# Patient Record
Sex: FEMALE | Race: WHITE | Employment: OTHER | URBAN - METROPOLITAN AREA
[De-identification: names, ages, dates, MRNs, and addresses within clinical notes are randomized per-mention and may not be internally consistent; named-entity substitution may affect disease eponyms.]

---

## 2018-03-21 ENCOUNTER — HOSPITAL ENCOUNTER (OUTPATIENT)
Dept: PREADMISSION TESTING | Age: 73
Discharge: HOME | End: 2018-03-21
Payer: MEDICARE

## 2018-03-21 VITALS — HEIGHT: 58 IN | BODY MASS INDEX: 26.24 KG/M2 | WEIGHT: 125 LBS

## 2018-03-21 LAB
ALBUMIN SERPL-MCNC: 3.8 G/DL (ref 3.4–5)
ALBUMIN/GLOB SERPL: 1.2 {RATIO} (ref 0.8–1.7)
ALP SERPL-CCNC: 81 U/L (ref 45–117)
ALT SERPL-CCNC: 23 U/L (ref 13–56)
ANION GAP SERPL CALC-SCNC: 8 MMOL/L (ref 3–18)
AST SERPL-CCNC: 23 U/L (ref 15–37)
BILIRUB SERPL-MCNC: 0.5 MG/DL (ref 0.2–1)
BUN SERPL-MCNC: 21 MG/DL (ref 7–18)
BUN/CREAT SERPL: 29 (ref 12–20)
CALCIUM SERPL-MCNC: 9.6 MG/DL (ref 8.5–10.1)
CHLORIDE SERPL-SCNC: 104 MMOL/L (ref 100–108)
CO2 SERPL-SCNC: 30 MMOL/L (ref 21–32)
CREAT SERPL-MCNC: 0.73 MG/DL (ref 0.6–1.3)
ERYTHROCYTE [DISTWIDTH] IN BLOOD BY AUTOMATED COUNT: 13.4 % (ref 11.6–14.5)
GLOBULIN SER CALC-MCNC: 3.2 G/DL (ref 2–4)
GLUCOSE SERPL-MCNC: 84 MG/DL (ref 74–99)
HCT VFR BLD AUTO: 38.7 % (ref 35–45)
HGB BLD-MCNC: 12.3 G/DL (ref 12–16)
MCH RBC QN AUTO: 31.3 PG (ref 24–34)
MCHC RBC AUTO-ENTMCNC: 31.8 G/DL (ref 31–37)
MCV RBC AUTO: 98.5 FL (ref 74–97)
PLATELET # BLD AUTO: 276 K/UL (ref 135–420)
PMV BLD AUTO: 11.2 FL (ref 9.2–11.8)
POTASSIUM SERPL-SCNC: 4.7 MMOL/L (ref 3.5–5.5)
PROT SERPL-MCNC: 7 G/DL (ref 6.4–8.2)
RBC # BLD AUTO: 3.93 M/UL (ref 4.2–5.3)
SODIUM SERPL-SCNC: 142 MMOL/L (ref 136–145)
WBC # BLD AUTO: 7.5 K/UL (ref 4.6–13.2)

## 2018-03-21 PROCEDURE — 85027 COMPLETE CBC AUTOMATED: CPT | Performed by: ORTHOPAEDIC SURGERY

## 2018-03-21 PROCEDURE — 93005 ELECTROCARDIOGRAM TRACING: CPT

## 2018-03-21 PROCEDURE — 87641 MR-STAPH DNA AMP PROBE: CPT | Performed by: ORTHOPAEDIC SURGERY

## 2018-03-21 PROCEDURE — 80053 COMPREHEN METABOLIC PANEL: CPT | Performed by: ORTHOPAEDIC SURGERY

## 2018-03-21 RX ORDER — ASPIRIN 81 MG/1
81 TABLET ORAL DAILY
COMMUNITY
End: 2020-01-08

## 2018-03-21 RX ORDER — TIZANIDINE 2 MG/1
1 TABLET ORAL
COMMUNITY
End: 2021-12-11

## 2018-03-21 RX ORDER — GABAPENTIN 100 MG/1
400 CAPSULE ORAL
COMMUNITY
End: 2022-09-02

## 2018-03-21 RX ORDER — OMEPRAZOLE 20 MG/1
20 CAPSULE, DELAYED RELEASE ORAL
COMMUNITY
End: 2020-01-08

## 2018-03-21 RX ORDER — CELECOXIB 100 MG/1
100 CAPSULE ORAL DAILY
COMMUNITY
End: 2018-03-26

## 2018-03-21 RX ORDER — SODIUM CHLORIDE, SODIUM LACTATE, POTASSIUM CHLORIDE, CALCIUM CHLORIDE 600; 310; 30; 20 MG/100ML; MG/100ML; MG/100ML; MG/100ML
125 INJECTION, SOLUTION INTRAVENOUS CONTINUOUS
Status: CANCELLED | OUTPATIENT
Start: 2018-03-21

## 2018-03-21 NOTE — PERIOP NOTES
Denies sleep apnea or nay personal or family history of complications with anesthesia cece prep reviewed ,meets criteria for special population

## 2018-03-22 LAB
ATRIAL RATE: 74 BPM
BACTERIA SPEC CULT: NORMAL
CALCULATED P AXIS, ECG09: 31 DEGREES
CALCULATED R AXIS, ECG10: 50 DEGREES
CALCULATED T AXIS, ECG11: 45 DEGREES
DIAGNOSIS, 93000: NORMAL
P-R INTERVAL, ECG05: 182 MS
Q-T INTERVAL, ECG07: 382 MS
QRS DURATION, ECG06: 92 MS
QTC CALCULATION (BEZET), ECG08: 424 MS
SERVICE CMNT-IMP: NORMAL
VENTRICULAR RATE, ECG03: 74 BPM

## 2018-03-26 PROBLEM — M51.26 HNP (HERNIATED NUCLEUS PULPOSUS), LUMBAR: Status: ACTIVE | Noted: 2018-03-26

## 2018-03-26 PROBLEM — M51.36 DDD (DEGENERATIVE DISC DISEASE), LUMBAR: Status: ACTIVE | Noted: 2018-03-26

## 2018-03-26 PROBLEM — M48.061 SPINAL STENOSIS, LUMBAR: Status: ACTIVE | Noted: 2018-03-26

## 2018-03-29 PROBLEM — M43.16 SPONDYLOLISTHESIS OF LUMBAR REGION: Status: ACTIVE | Noted: 2018-03-29

## 2018-03-29 PROBLEM — M41.9 SCOLIOSIS OF THORACOLUMBAR SPINE: Status: ACTIVE | Noted: 2018-03-29

## 2018-03-29 NOTE — H&P
Patient Name:  Andrea Wild   YOB: 1945      Chief Complaint:  Right-sided buttock pain. History of Chief Complaint:  Ms. Temo Campos is a 69-year-old female who is being seen for right-sided buttock pain. She has had significant problems with the right-sided buttock and hip. Bending and turning causes pain. Standing for any length of time causes pain. She has a known history of scoliosis. She does not remember any specific accident or injury. She says her back, hip, and leg are still giving her problems, and she is still having pain. She has difficulty bending, turning, and twisting. Standing for any length of time or walking for any length of time causes pain. The shot around her hip did not really seem to help. She has difficulty doing any activities. Past Medical/Surgical History:    Disease/Disorder Type Date Side Surgery Date Side Comment   Arthritis          Depression          Hyperlipidemia          Gita cerebellar ataxia          Nerve disorder              Eye surgery 1962 bilateral        Leg surgery 1999 right        Rectum prolapse surgery 2008         Spinal fusion, cervical 2012  [facility] 01/05/2017 - C3-4, 5-6       Cataract extraction 2014         Hip replacement 2014 right      Allergies:  No known allergies. Ingredient Reaction Medication Name Comment   NO KNOWN ALLERGIES          Current Medications:    Medication Directions   Niaspan Extended-Release 1,000 mg tablet,extended release    paroxetine 20 mg tablet    Actonel 150 mg tablet    diclofenac sodium 75 mg tablet,delayed release      Social History:      SMOKING  Status Tobacco Type Units Per Day Yrs Used   Never smoker        ALCOHOL  Type: Wine. 8 oz. consumed weekly.     Family History:    Disease Detail Family Member Age Cause of Death Comments   Family history of Cancer   N    Family history of Stroke   N    Family history of Hypertension   N    Family history of Osteoarthritis   N      Review of Systems: Pertinent positives include depression, incontinence, loss of balance, muscle weakness and numbness/tingling. Pertinent negatives include blurred vision, chest pain, chills, cold, discharge of the eyes, dizziness, double vision, fever, headache, hearing loss, heart murmur, itching of the eyes, palpitations, redness of the eyes, rheumatic fever, ringing in ears, sore throat/hoarseness, weight change, abdominal pain, anxiety, bipolar disorder, bladder/kidney infection, bloody stool, blood in urine, burning sensation, changes in mood, chronic cough, diarrhea, difficulty breathing, difficulty swallowing, fainting, frequent urinating, fracture/dislocation, gas/bloating, gout, hemorrhoids, joint pain, joint stiffness, memory loss, nausea/vomiting, pain on breathing, painful urination, psoriasis, rash/itching, Raynaud's phenomenon, rheumatoid disease, seizure disorder, shortness of breath, sprain/strain, swelling of feet, tendonitis, varicose veins and wheezing. Vitals:  Date BP Pulse Temp (F) Resp. (per min.) Height (Total in.) Weight (lbs.) BMI   12/08/2017     59.00 120.00 24.24   01/10/2017     60.00  25.00   11/26/2013     60.00  26.37     Physical Examination:    General:  The patient appears healthy. Cardiovascular:  Arterial pulses are normal.  Skin:  The skin is normal appearing with no contusions or wounds noted. Heart- RRR  Lungs-CTA cullen  Abdomen- +BS,soft,nontender  Musculoskeletal:  There is tenderness around the right PSIS. The thoracolumbar spine has normal kyphosis, a normal appearance, and no scoliosis. There is full range of motion of the cervical, thoracic, and lumbar spine and of the hips, knees, and ankles. Straight leg raising and crossed straight leg raising tests are negative. Neurological:  There is no weakness in the thoracic, lumbar, or sacral spine or in the lower extremities or hips. Deep tendon reflexes are present and normal bilaterally.   Ankle and knee jerks are normal with no clonus. Radiograph Examination:  AP, lateral, bilateral oblique, flexion and extension views of the lumbar spine were obtained and interpreted in the office 12/8/17 and reveal severe degenerative disc disease with a 52 degrees scoliosis, apex to the left. An AP view of the pelvis was obtained and interpreted in the office and reveals right total hip arthroplasty. Review of her MRI scan of the lumbar spine [facility] 2/21/18 reveals severe spinal stenosis at L3 to S1 with spondylolisthesis and severe scoliosis above this. Plan:  Ms. Caitlyn Jenkins, her , and I had a long discussion about the treatments for her back pain, hip pain, and leg pain including surgical intervention, the risks, and benefits as well as the different surgical approaches and decision making. We also discussed nonsurgical care for this condition including medications, injections, physical therapy, rehabilitation, activity modification, and brace utilization. At this point, given her severe spinal stenosis, spondylolisthesis, and degenerative disc disease she would like to proceed with operative intervention. We will plan for L3 to S1 decompression and fusion. The risks versus the benefits as well as the alternatives were fully explained to the patient. Risks include, but are not limited to, paralysis, death, heart attack, stroke, pulmonary embolism, deep vein thrombosis, infection, failure to relieve pain, increase in back or leg pain, reherniation of disc material, need for revision surgery, scarring, spinal fluid leak, bowel or bladder dysfunction, disease transmission, chronic graft site pain, instrumentation failure, pseudarthrosis, and the need for chronic ambulatory assist devices. The patient states full understanding of the risks and benefits and wishes to proceed.       Admitting as inpatient acknowledging increased risk of anesthesia and need for post-operative monitoring of   Depression   Hyperlipidemia   [place]iddles cerebellar ataxia. Naoma Gerardo

## 2018-04-04 ENCOUNTER — APPOINTMENT (OUTPATIENT)
Dept: GENERAL RADIOLOGY | Age: 73
DRG: 460 | End: 2018-04-04
Attending: ORTHOPAEDIC SURGERY
Payer: MEDICARE

## 2018-04-04 ENCOUNTER — HOSPITAL ENCOUNTER (INPATIENT)
Age: 73
LOS: 5 days | Discharge: NURSING, REHAB OR LONG-TERM CARE | DRG: 460 | End: 2018-04-09
Attending: ORTHOPAEDIC SURGERY | Admitting: ORTHOPAEDIC SURGERY
Payer: MEDICARE

## 2018-04-04 ENCOUNTER — ANESTHESIA EVENT (OUTPATIENT)
Dept: SURGERY | Age: 73
DRG: 460 | End: 2018-04-04
Payer: MEDICARE

## 2018-04-04 ENCOUNTER — ANESTHESIA (OUTPATIENT)
Dept: SURGERY | Age: 73
DRG: 460 | End: 2018-04-04
Payer: MEDICARE

## 2018-04-04 DIAGNOSIS — M48.062 SPINAL STENOSIS OF LUMBAR REGION WITH NEUROGENIC CLAUDICATION: Primary | ICD-10-CM

## 2018-04-04 LAB
ABO + RH BLD: NORMAL
BLOOD GROUP ANTIBODIES SERPL: NORMAL
SPECIMEN EXP DATE BLD: NORMAL

## 2018-04-04 PROCEDURE — 74011000250 HC RX REV CODE- 250

## 2018-04-04 PROCEDURE — 74011000258 HC RX REV CODE- 258: Performed by: PHYSICIAN ASSISTANT

## 2018-04-04 PROCEDURE — 77030008683 HC TU ET CUF COVD -A: Performed by: ANESTHESIOLOGY

## 2018-04-04 PROCEDURE — 77030008462 HC STPLR SKN PROX J&J -A: Performed by: ORTHOPAEDIC SURGERY

## 2018-04-04 PROCEDURE — 76210000016 HC OR PH I REC 1 TO 1.5 HR: Performed by: ORTHOPAEDIC SURGERY

## 2018-04-04 PROCEDURE — 77030003029 HC SUT VCRL J&J -B: Performed by: ORTHOPAEDIC SURGERY

## 2018-04-04 PROCEDURE — 74011250636 HC RX REV CODE- 250/636

## 2018-04-04 PROCEDURE — C1713 ANCHOR/SCREW BN/BN,TIS/BN: HCPCS | Performed by: ORTHOPAEDIC SURGERY

## 2018-04-04 PROCEDURE — 77030012406 HC DRN WND PENRS BARD -A: Performed by: ORTHOPAEDIC SURGERY

## 2018-04-04 PROCEDURE — 77030020262 HC SOL INJ SOD CL 0.9% 100ML: Performed by: ORTHOPAEDIC SURGERY

## 2018-04-04 PROCEDURE — 86900 BLOOD TYPING SEROLOGIC ABO: CPT | Performed by: ORTHOPAEDIC SURGERY

## 2018-04-04 PROCEDURE — 0SG3071 FUSION OF LUMBOSACRAL JOINT WITH AUTOLOGOUS TISSUE SUBSTITUTE, POSTERIOR APPROACH, POSTERIOR COLUMN, OPEN APPROACH: ICD-10-PCS | Performed by: ORTHOPAEDIC SURGERY

## 2018-04-04 PROCEDURE — 74011250636 HC RX REV CODE- 250/636: Performed by: ORTHOPAEDIC SURGERY

## 2018-04-04 PROCEDURE — 77030003028 HC SUT VCRL J&J -A: Performed by: ORTHOPAEDIC SURGERY

## 2018-04-04 PROCEDURE — 77030013079 HC BLNKT BAIR HGGR 3M -A: Performed by: ANESTHESIOLOGY

## 2018-04-04 PROCEDURE — 74011250636 HC RX REV CODE- 250/636: Performed by: PHYSICIAN ASSISTANT

## 2018-04-04 PROCEDURE — 77030011640 HC PAD GRND REM COVD -A: Performed by: ORTHOPAEDIC SURGERY

## 2018-04-04 PROCEDURE — 74011250637 HC RX REV CODE- 250/637: Performed by: PHYSICIAN ASSISTANT

## 2018-04-04 PROCEDURE — 77010033678 HC OXYGEN DAILY

## 2018-04-04 PROCEDURE — 77030018836 HC SOL IRR NACL ICUM -A: Performed by: ORTHOPAEDIC SURGERY

## 2018-04-04 PROCEDURE — 77030032490 HC SLV COMPR SCD KNE COVD -B: Performed by: ORTHOPAEDIC SURGERY

## 2018-04-04 PROCEDURE — 76010000132 HC OR TIME 2.5 TO 3 HR: Performed by: ORTHOPAEDIC SURGERY

## 2018-04-04 PROCEDURE — 76060000036 HC ANESTHESIA 2.5 TO 3 HR: Performed by: ORTHOPAEDIC SURGERY

## 2018-04-04 PROCEDURE — 74011000250 HC RX REV CODE- 250: Performed by: PHYSICIAN ASSISTANT

## 2018-04-04 PROCEDURE — 36415 COLL VENOUS BLD VENIPUNCTURE: CPT | Performed by: ORTHOPAEDIC SURGERY

## 2018-04-04 PROCEDURE — 77030013708 HC HNDPC SUC IRR PULS STRY –B: Performed by: ORTHOPAEDIC SURGERY

## 2018-04-04 PROCEDURE — 0SG1071 FUSION OF 2 OR MORE LUMBAR VERTEBRAL JOINTS WITH AUTOLOGOUS TISSUE SUBSTITUTE, POSTERIOR APPROACH, POSTERIOR COLUMN, OPEN APPROACH: ICD-10-PCS | Performed by: ORTHOPAEDIC SURGERY

## 2018-04-04 PROCEDURE — 77030034849: Performed by: ORTHOPAEDIC SURGERY

## 2018-04-04 PROCEDURE — 30233N0 TRANSFUSION OF AUTOLOGOUS RED BLOOD CELLS INTO PERIPHERAL VEIN, PERCUTANEOUS APPROACH: ICD-10-PCS | Performed by: ANESTHESIOLOGY

## 2018-04-04 PROCEDURE — 77030008477 HC STYL SATN SLP COVD -A: Performed by: ANESTHESIOLOGY

## 2018-04-04 PROCEDURE — 65270000029 HC RM PRIVATE

## 2018-04-04 PROCEDURE — 77030004402 HC BUR NEUR STRY -C: Performed by: ORTHOPAEDIC SURGERY

## 2018-04-04 PROCEDURE — 77030006643: Performed by: ANESTHESIOLOGY

## 2018-04-04 PROCEDURE — 74011000250 HC RX REV CODE- 250: Performed by: ORTHOPAEDIC SURGERY

## 2018-04-04 PROCEDURE — 97162 PT EVAL MOD COMPLEX 30 MIN: CPT

## 2018-04-04 PROCEDURE — 77030020782 HC GWN BAIR PAWS FLX 3M -B: Performed by: ORTHOPAEDIC SURGERY

## 2018-04-04 DEVICE — SCREW SPNL L45MM OD7MM SLD GEN 3 PEDFUSE RESET: Type: IMPLANTABLE DEVICE | Site: SPINE LUMBAR | Status: FUNCTIONAL

## 2018-04-04 DEVICE — IMPLANTABLE DEVICE: Type: IMPLANTABLE DEVICE | Site: SPINE LUMBAR | Status: FUNCTIONAL

## 2018-04-04 DEVICE — SET SCR SPNL L5-7MM PEDFUSE: Type: IMPLANTABLE DEVICE | Site: SPINE LUMBAR | Status: FUNCTIONAL

## 2018-04-04 DEVICE — SCREW SPNL L50MM OD7MM SLD GEN 3 PEDFUSE RESET: Type: IMPLANTABLE DEVICE | Site: SPINE LUMBAR | Status: FUNCTIONAL

## 2018-04-04 DEVICE — ROD SPNL L80MM DIA5.5MM LORDTC PEDFUSE: Type: IMPLANTABLE DEVICE | Site: SPINE LUMBAR | Status: FUNCTIONAL

## 2018-04-04 RX ORDER — INSULIN LISPRO 100 [IU]/ML
INJECTION, SOLUTION INTRAVENOUS; SUBCUTANEOUS ONCE
Status: DISCONTINUED | OUTPATIENT
Start: 2018-04-04 | End: 2018-04-04 | Stop reason: HOSPADM

## 2018-04-04 RX ORDER — MAGNESIUM SULFATE 100 %
4 CRYSTALS MISCELLANEOUS AS NEEDED
Status: DISCONTINUED | OUTPATIENT
Start: 2018-04-04 | End: 2018-04-04 | Stop reason: HOSPADM

## 2018-04-04 RX ORDER — SODIUM CHLORIDE, SODIUM LACTATE, POTASSIUM CHLORIDE, CALCIUM CHLORIDE 600; 310; 30; 20 MG/100ML; MG/100ML; MG/100ML; MG/100ML
50 INJECTION, SOLUTION INTRAVENOUS CONTINUOUS
Status: DISCONTINUED | OUTPATIENT
Start: 2018-04-04 | End: 2018-04-04 | Stop reason: HOSPADM

## 2018-04-04 RX ORDER — OXYCODONE AND ACETAMINOPHEN 5; 325 MG/1; MG/1
1 TABLET ORAL AS NEEDED
Status: DISCONTINUED | OUTPATIENT
Start: 2018-04-04 | End: 2018-04-04 | Stop reason: HOSPADM

## 2018-04-04 RX ORDER — ONDANSETRON 2 MG/ML
INJECTION INTRAMUSCULAR; INTRAVENOUS AS NEEDED
Status: DISCONTINUED | OUTPATIENT
Start: 2018-04-04 | End: 2018-04-04 | Stop reason: HOSPADM

## 2018-04-04 RX ORDER — NALOXONE HYDROCHLORIDE 0.4 MG/ML
0.1 INJECTION, SOLUTION INTRAMUSCULAR; INTRAVENOUS; SUBCUTANEOUS
Status: DISCONTINUED | OUTPATIENT
Start: 2018-04-04 | End: 2018-04-04 | Stop reason: HOSPADM

## 2018-04-04 RX ORDER — OMEPRAZOLE 20 MG/1
20 CAPSULE, DELAYED RELEASE ORAL
Status: DISCONTINUED | OUTPATIENT
Start: 2018-04-04 | End: 2018-04-09 | Stop reason: HOSPADM

## 2018-04-04 RX ORDER — DEXTROSE 50 % IN WATER (D50W) INTRAVENOUS SYRINGE
25-50 AS NEEDED
Status: DISCONTINUED | OUTPATIENT
Start: 2018-04-04 | End: 2018-04-04 | Stop reason: HOSPADM

## 2018-04-04 RX ORDER — DEXAMETHASONE SODIUM PHOSPHATE 4 MG/ML
INJECTION, SOLUTION INTRA-ARTICULAR; INTRALESIONAL; INTRAMUSCULAR; INTRAVENOUS; SOFT TISSUE AS NEEDED
Status: DISCONTINUED | OUTPATIENT
Start: 2018-04-04 | End: 2018-04-04 | Stop reason: HOSPADM

## 2018-04-04 RX ORDER — FENTANYL CITRATE 50 UG/ML
INJECTION, SOLUTION INTRAMUSCULAR; INTRAVENOUS AS NEEDED
Status: DISCONTINUED | OUTPATIENT
Start: 2018-04-04 | End: 2018-04-04 | Stop reason: HOSPADM

## 2018-04-04 RX ORDER — HYDROCODONE BITARTRATE AND ACETAMINOPHEN 5; 325 MG/1; MG/1
1 TABLET ORAL
Status: DISCONTINUED | OUTPATIENT
Start: 2018-04-04 | End: 2018-04-05

## 2018-04-04 RX ORDER — GABAPENTIN 400 MG/1
400 CAPSULE ORAL
Status: DISCONTINUED | OUTPATIENT
Start: 2018-04-04 | End: 2018-04-09 | Stop reason: HOSPADM

## 2018-04-04 RX ORDER — ACETAMINOPHEN 325 MG/1
650 TABLET ORAL
Status: DISCONTINUED | OUTPATIENT
Start: 2018-04-04 | End: 2018-04-09 | Stop reason: HOSPADM

## 2018-04-04 RX ORDER — ONDANSETRON 4 MG/1
4 TABLET, ORALLY DISINTEGRATING ORAL
Status: DISCONTINUED | OUTPATIENT
Start: 2018-04-04 | End: 2018-04-09 | Stop reason: HOSPADM

## 2018-04-04 RX ORDER — SODIUM CHLORIDE 0.9 % (FLUSH) 0.9 %
5-10 SYRINGE (ML) INJECTION AS NEEDED
Status: DISCONTINUED | OUTPATIENT
Start: 2018-04-04 | End: 2018-04-04 | Stop reason: HOSPADM

## 2018-04-04 RX ORDER — CEFAZOLIN SODIUM 2 G/50ML
2 SOLUTION INTRAVENOUS EVERY 8 HOURS
Status: COMPLETED | OUTPATIENT
Start: 2018-04-04 | End: 2018-04-05

## 2018-04-04 RX ORDER — PAROXETINE HYDROCHLORIDE 20 MG/1
20 TABLET, FILM COATED ORAL DAILY
Status: DISCONTINUED | OUTPATIENT
Start: 2018-04-05 | End: 2018-04-09 | Stop reason: HOSPADM

## 2018-04-04 RX ORDER — ROCURONIUM BROMIDE 10 MG/ML
INJECTION, SOLUTION INTRAVENOUS AS NEEDED
Status: DISCONTINUED | OUTPATIENT
Start: 2018-04-04 | End: 2018-04-04 | Stop reason: HOSPADM

## 2018-04-04 RX ORDER — HYDROMORPHONE HYDROCHLORIDE 2 MG/ML
0.5 INJECTION, SOLUTION INTRAMUSCULAR; INTRAVENOUS; SUBCUTANEOUS
Status: DISCONTINUED | OUTPATIENT
Start: 2018-04-04 | End: 2018-04-04 | Stop reason: HOSPADM

## 2018-04-04 RX ORDER — SODIUM CHLORIDE, SODIUM LACTATE, POTASSIUM CHLORIDE, CALCIUM CHLORIDE 600; 310; 30; 20 MG/100ML; MG/100ML; MG/100ML; MG/100ML
INJECTION, SOLUTION INTRAVENOUS
Status: DISCONTINUED | OUTPATIENT
Start: 2018-04-04 | End: 2018-04-04 | Stop reason: HOSPADM

## 2018-04-04 RX ORDER — SODIUM CHLORIDE, SODIUM LACTATE, POTASSIUM CHLORIDE, CALCIUM CHLORIDE 600; 310; 30; 20 MG/100ML; MG/100ML; MG/100ML; MG/100ML
125 INJECTION, SOLUTION INTRAVENOUS CONTINUOUS
Status: DISCONTINUED | OUTPATIENT
Start: 2018-04-04 | End: 2018-04-05

## 2018-04-04 RX ORDER — PROPOFOL 10 MG/ML
INJECTION, EMULSION INTRAVENOUS AS NEEDED
Status: DISCONTINUED | OUTPATIENT
Start: 2018-04-04 | End: 2018-04-04 | Stop reason: HOSPADM

## 2018-04-04 RX ORDER — GLYCOPYRROLATE 0.2 MG/ML
INJECTION INTRAMUSCULAR; INTRAVENOUS AS NEEDED
Status: DISCONTINUED | OUTPATIENT
Start: 2018-04-04 | End: 2018-04-04 | Stop reason: HOSPADM

## 2018-04-04 RX ORDER — FENTANYL CITRATE 50 UG/ML
25 INJECTION, SOLUTION INTRAMUSCULAR; INTRAVENOUS
Status: ACTIVE | OUTPATIENT
Start: 2018-04-04 | End: 2018-04-04

## 2018-04-04 RX ORDER — SODIUM CHLORIDE 0.9 % (FLUSH) 0.9 %
5-10 SYRINGE (ML) INJECTION EVERY 8 HOURS
Status: DISCONTINUED | OUTPATIENT
Start: 2018-04-04 | End: 2018-04-09 | Stop reason: HOSPADM

## 2018-04-04 RX ORDER — SODIUM CHLORIDE 0.9 % (FLUSH) 0.9 %
5-10 SYRINGE (ML) INJECTION AS NEEDED
Status: DISCONTINUED | OUTPATIENT
Start: 2018-04-04 | End: 2018-04-09 | Stop reason: HOSPADM

## 2018-04-04 RX ORDER — HYDROCODONE BITARTRATE AND ACETAMINOPHEN 7.5; 325 MG/1; MG/1
1 TABLET ORAL
Status: DISCONTINUED | OUTPATIENT
Start: 2018-04-04 | End: 2018-04-05

## 2018-04-04 RX ORDER — DIAZEPAM 5 MG/1
2.5 TABLET ORAL
Status: DISCONTINUED | OUTPATIENT
Start: 2018-04-04 | End: 2018-04-09 | Stop reason: HOSPADM

## 2018-04-04 RX ORDER — TIZANIDINE 2 MG/1
1 TABLET ORAL
Status: DISCONTINUED | OUTPATIENT
Start: 2018-04-04 | End: 2018-04-07

## 2018-04-04 RX ORDER — NEOSTIGMINE METHYLSULFATE 5 MG/5 ML
SYRINGE (ML) INTRAVENOUS AS NEEDED
Status: DISCONTINUED | OUTPATIENT
Start: 2018-04-04 | End: 2018-04-04 | Stop reason: HOSPADM

## 2018-04-04 RX ORDER — POLYETHYLENE GLYCOL 3350 17 G/17G
17 POWDER, FOR SOLUTION ORAL DAILY
Status: DISCONTINUED | OUTPATIENT
Start: 2018-04-05 | End: 2018-04-09 | Stop reason: HOSPADM

## 2018-04-04 RX ORDER — MIDAZOLAM HYDROCHLORIDE 1 MG/ML
INJECTION, SOLUTION INTRAMUSCULAR; INTRAVENOUS AS NEEDED
Status: DISCONTINUED | OUTPATIENT
Start: 2018-04-04 | End: 2018-04-04 | Stop reason: HOSPADM

## 2018-04-04 RX ORDER — ONDANSETRON 2 MG/ML
4 INJECTION INTRAMUSCULAR; INTRAVENOUS ONCE
Status: DISCONTINUED | OUTPATIENT
Start: 2018-04-04 | End: 2018-04-04 | Stop reason: HOSPADM

## 2018-04-04 RX ORDER — EPHEDRINE SULFATE/0.9% NACL/PF 25 MG/5 ML
SYRINGE (ML) INTRAVENOUS AS NEEDED
Status: DISCONTINUED | OUTPATIENT
Start: 2018-04-04 | End: 2018-04-04 | Stop reason: HOSPADM

## 2018-04-04 RX ORDER — LUBIPROSTONE 24 UG/1
24 CAPSULE, GELATIN COATED ORAL 2 TIMES DAILY WITH MEALS
Status: DISCONTINUED | OUTPATIENT
Start: 2018-04-04 | End: 2018-04-09 | Stop reason: HOSPADM

## 2018-04-04 RX ORDER — CEFAZOLIN SODIUM 2 G/50ML
2 SOLUTION INTRAVENOUS ONCE
Status: COMPLETED | OUTPATIENT
Start: 2018-04-04 | End: 2018-04-04

## 2018-04-04 RX ORDER — DIPHENHYDRAMINE HYDROCHLORIDE 50 MG/ML
12.5 INJECTION, SOLUTION INTRAMUSCULAR; INTRAVENOUS
Status: DISCONTINUED | OUTPATIENT
Start: 2018-04-04 | End: 2018-04-09 | Stop reason: HOSPADM

## 2018-04-04 RX ORDER — NALOXONE HYDROCHLORIDE 0.4 MG/ML
0.1 INJECTION, SOLUTION INTRAMUSCULAR; INTRAVENOUS; SUBCUTANEOUS AS NEEDED
Status: DISCONTINUED | OUTPATIENT
Start: 2018-04-04 | End: 2018-04-09 | Stop reason: HOSPADM

## 2018-04-04 RX ORDER — LIDOCAINE HYDROCHLORIDE 20 MG/ML
INJECTION, SOLUTION EPIDURAL; INFILTRATION; INTRACAUDAL; PERINEURAL AS NEEDED
Status: DISCONTINUED | OUTPATIENT
Start: 2018-04-04 | End: 2018-04-04 | Stop reason: HOSPADM

## 2018-04-04 RX ORDER — DOCUSATE SODIUM 100 MG/1
100 CAPSULE, LIQUID FILLED ORAL 2 TIMES DAILY
Status: DISCONTINUED | OUTPATIENT
Start: 2018-04-04 | End: 2018-04-09 | Stop reason: HOSPADM

## 2018-04-04 RX ORDER — HYDROMORPHONE HYDROCHLORIDE 2 MG/ML
0.5 INJECTION, SOLUTION INTRAMUSCULAR; INTRAVENOUS; SUBCUTANEOUS
Status: DISCONTINUED | OUTPATIENT
Start: 2018-04-04 | End: 2018-04-04 | Stop reason: SDUPTHER

## 2018-04-04 RX ADMIN — FENTANYL CITRATE 25 MCG: 50 INJECTION, SOLUTION INTRAMUSCULAR; INTRAVENOUS at 15:03

## 2018-04-04 RX ADMIN — HYDROCODONE BITARTRATE AND ACETAMINOPHEN 1 TABLET: 7.5; 325 TABLET ORAL at 23:20

## 2018-04-04 RX ADMIN — CEFAZOLIN SODIUM 2 G: 2 SOLUTION INTRAVENOUS at 11:55

## 2018-04-04 RX ADMIN — MIDAZOLAM HYDROCHLORIDE 2 MG: 1 INJECTION, SOLUTION INTRAMUSCULAR; INTRAVENOUS at 11:43

## 2018-04-04 RX ADMIN — ROCURONIUM BROMIDE 40 MG: 10 INJECTION, SOLUTION INTRAVENOUS at 11:48

## 2018-04-04 RX ADMIN — TRANEXAMIC ACID 1 G: 100 INJECTION, SOLUTION INTRAVENOUS at 12:05

## 2018-04-04 RX ADMIN — SODIUM CHLORIDE, SODIUM LACTATE, POTASSIUM CHLORIDE, CALCIUM CHLORIDE: 600; 310; 30; 20 INJECTION, SOLUTION INTRAVENOUS at 12:45

## 2018-04-04 RX ADMIN — ONDANSETRON 4 MG: 2 INJECTION INTRAMUSCULAR; INTRAVENOUS at 13:48

## 2018-04-04 RX ADMIN — Medication: at 15:32

## 2018-04-04 RX ADMIN — SODIUM CHLORIDE, SODIUM LACTATE, POTASSIUM CHLORIDE, CALCIUM CHLORIDE: 600; 310; 30; 20 INJECTION, SOLUTION INTRAVENOUS at 09:37

## 2018-04-04 RX ADMIN — OMEPRAZOLE 20 MG: 20 CAPSULE, DELAYED RELEASE ORAL at 22:03

## 2018-04-04 RX ADMIN — TIZANIDINE 1 MG: 2 TABLET ORAL at 22:03

## 2018-04-04 RX ADMIN — FENTANYL CITRATE 50 MCG: 50 INJECTION, SOLUTION INTRAMUSCULAR; INTRAVENOUS at 12:29

## 2018-04-04 RX ADMIN — SODIUM CHLORIDE, SODIUM LACTATE, POTASSIUM CHLORIDE, AND CALCIUM CHLORIDE 125 ML/HR: 600; 310; 30; 20 INJECTION, SOLUTION INTRAVENOUS at 19:20

## 2018-04-04 RX ADMIN — DOCUSATE SODIUM 100 MG: 100 CAPSULE, LIQUID FILLED ORAL at 22:03

## 2018-04-04 RX ADMIN — FENTANYL CITRATE 25 MCG: 50 INJECTION, SOLUTION INTRAMUSCULAR; INTRAVENOUS at 15:13

## 2018-04-04 RX ADMIN — FENTANYL CITRATE 25 MCG: 50 INJECTION, SOLUTION INTRAMUSCULAR; INTRAVENOUS at 15:23

## 2018-04-04 RX ADMIN — FENTANYL CITRATE 50 MCG: 50 INJECTION, SOLUTION INTRAMUSCULAR; INTRAVENOUS at 12:03

## 2018-04-04 RX ADMIN — GLYCOPYRROLATE 0.5 MG: 0.2 INJECTION INTRAMUSCULAR; INTRAVENOUS at 14:06

## 2018-04-04 RX ADMIN — FENTANYL CITRATE 50 MCG: 50 INJECTION, SOLUTION INTRAMUSCULAR; INTRAVENOUS at 13:32

## 2018-04-04 RX ADMIN — Medication 3 MG: at 14:06

## 2018-04-04 RX ADMIN — GABAPENTIN 400 MG: 400 CAPSULE ORAL at 22:03

## 2018-04-04 RX ADMIN — ROCURONIUM BROMIDE 20 MG: 10 INJECTION, SOLUTION INTRAVENOUS at 12:34

## 2018-04-04 RX ADMIN — LUBIPROSTONE 24 MCG: 24 CAPSULE, GELATIN COATED ORAL at 19:24

## 2018-04-04 RX ADMIN — DEXAMETHASONE SODIUM PHOSPHATE 4 MG: 4 INJECTION, SOLUTION INTRA-ARTICULAR; INTRALESIONAL; INTRAMUSCULAR; INTRAVENOUS; SOFT TISSUE at 12:20

## 2018-04-04 RX ADMIN — PROPOFOL 120 MG: 10 INJECTION, EMULSION INTRAVENOUS at 11:48

## 2018-04-04 RX ADMIN — FENTANYL CITRATE 50 MCG: 50 INJECTION, SOLUTION INTRAMUSCULAR; INTRAVENOUS at 12:24

## 2018-04-04 RX ADMIN — LIDOCAINE HYDROCHLORIDE 50 MG: 20 INJECTION, SOLUTION EPIDURAL; INFILTRATION; INTRACAUDAL; PERINEURAL at 11:48

## 2018-04-04 RX ADMIN — GLYCOPYRROLATE 0.2 MG: 0.2 INJECTION INTRAMUSCULAR; INTRAVENOUS at 11:48

## 2018-04-04 RX ADMIN — SODIUM CHLORIDE, SODIUM LACTATE, POTASSIUM CHLORIDE, AND CALCIUM CHLORIDE 125 ML/HR: 600; 310; 30; 20 INJECTION, SOLUTION INTRAVENOUS at 09:37

## 2018-04-04 RX ADMIN — SODIUM CHLORIDE, SODIUM LACTATE, POTASSIUM CHLORIDE, AND CALCIUM CHLORIDE 125 ML/HR: 600; 310; 30; 20 INJECTION, SOLUTION INTRAVENOUS at 15:17

## 2018-04-04 RX ADMIN — CEFAZOLIN SODIUM 2 G: 2 SOLUTION INTRAVENOUS at 19:24

## 2018-04-04 RX ADMIN — FENTANYL CITRATE 50 MCG: 50 INJECTION, SOLUTION INTRAMUSCULAR; INTRAVENOUS at 13:11

## 2018-04-04 RX ADMIN — Medication 10 MG: at 13:47

## 2018-04-04 RX ADMIN — ROCURONIUM BROMIDE 10 MG: 10 INJECTION, SOLUTION INTRAVENOUS at 13:31

## 2018-04-04 RX ADMIN — Medication 10 MG: at 12:53

## 2018-04-04 RX ADMIN — Medication: at 17:09

## 2018-04-04 NOTE — ROUTINE PROCESS
1640  TRANSFER - IN REPORT:    Verbal report received from CLAUDIA Mcgee RN on [employer]  being received from PACU for routine post - op. Report consisted of patients Situation, Background, Assessment and   Recommendations(SBAR). Information from the following report(s) SBAR, Kardex, OR Summary, Intake/Output and MAR was reviewed with the receiving nurse. Opportunity for questions and clarification was provided. Assessment to be completed upon patients arrival to unit and care assumed.

## 2018-04-04 NOTE — PERIOP NOTES
TRANSFER - OUT REPORT:    Verbal report given to Femi Rose RN(name) on [facility]cel[facility]  being transferred to [address] [place] [address](unit) for routine progression of care       Report consisted of patients Situation, Background, Assessment and   Recommendations(SBAR). Information from the following report(s) SBAR, Kardex, Procedure Summary, MAR and Recent Results was reviewed with the receiving nurse. Lines:   Peripheral IV 04/04/18 Right Wrist (Active)   Site Assessment Clean, dry, & intact 4/4/2018  3:15 PM   Phlebitis Assessment 0 4/4/2018  3:15 PM   Infiltration Assessment 0 4/4/2018  3:15 PM   Dressing Status Clean, dry, & intact 4/4/2018  3:15 PM   Dressing Type Tape;Transparent 4/4/2018  3:15 PM   Hub Color/Line Status Green; Infusing 4/4/2018  3:15 PM        Opportunity for questions and clarification was provided.       Patient transported with:   O2 @ 2 liters  Registered Nurse

## 2018-04-04 NOTE — PROGRESS NOTES
Problem: Falls - Risk of  Goal: *Absence of Falls  Document Amara Fall Risk and appropriate interventions in the flowsheet.    Outcome: Progressing Towards Goal  Fall Risk Interventions:       Mentation Interventions: More frequent rounding         Elimination Interventions: Call light in reach

## 2018-04-04 NOTE — PROGRESS NOTES
[facility] at this time. Assessment completed in flowsheet. Pt is alert and oriented x 4. Denies SOB and chest pain. Pt lungs clear bilaterally. Capillary refill less than 3 seconds. Pt denies numbness and tingling to all extremities. Stated pain  0/10. Pt has 18G IV to the RT cephalic. Pt has ABD and 4x4  dressing. SCDs and TEDs applied  bilaterally. Pt encouraged to continue use of IS, Pt verbalized understanding. Ice pack applied. Call light and possessions within reach. Bed is in the lowest position. Will continue to monitor.     Pt's  at bedside    1810  PT in assessing patient

## 2018-04-04 NOTE — IP AVS SNAPSHOT
[address] [address] 
[phone] Patient: Agatha Still MRN: [id number] About your hospitalization You were admitted on:  April 4, 2018 You last received care in the:  THE [facility] [address] You were discharged on:  April 5, 2018 Why you were hospitalized Your primary diagnosis was:  Spinal Stenosis, Lumbar Your diagnoses also included:  Ddd (Degenerative Disc Disease), Lumbar, Hnp (Herniated Nucleus Pulposus), Lumbar, Spondylolisthesis Of Lumbar Region, Scoliosis Of Thoracolumbar Spine Follow-up Information Follow up With Details Comments Contact Info Nicho Tatum MD On 4/19/2018 Follow up appointment @ 1:00pm [address] [facility] and [address] [place] [address] 
[phone] Kristine Palomares [facility] [address] [address] 
[phone] Discharge Orders None A check bill indicates which time of day the medication should be taken. My Medications START taking these medications Instructions Each Dose to Equal  
 Morning Noon Evening Bedtime  
 traMADol 50 mg tablet Commonly known as:  ULTRAM  
   
Your last dose was: Your next dose is: Take 1 Tab by mouth every six (6) hours as needed. Max Daily Amount: 200 mg.  
 50 mg CONTINUE taking these medications Instructions Each Dose to Equal  
 Morning Noon Evening Bedtime  
 aspirin delayed-release 81 mg tablet Your last dose was: Your next dose is: Take 81 mg by mouth daily. 81 mg CALCIUM 500 + D PO Your last dose was: Your next dose is: Take 1 Tab by mouth two (2) times a day. 1 Tab CENTRUM SILVER WOMEN PO Your last dose was: Your next dose is: Take  by mouth daily. FISH OIL 1,000 mg Cap Generic drug:  omega-3 fatty acids-vitamin e Your last dose was: Your next dose is: Take 1 Cap by mouth daily. 1 Cap  
    
   
   
   
  
 gabapentin 100 mg capsule Commonly known as:  NEURONTIN Your last dose was: Your next dose is: Take 400 mg by mouth nightly. 400 mg MIRALAX 17 gram/dose powder Generic drug:  polyethylene glycol Your last dose was: Your next dose is: Take 17 g by mouth daily. 17 g  
    
   
   
   
  
 omeprazole 20 mg capsule Commonly known as:  PRILOSEC Your last dose was: Your next dose is: Take 20 mg by mouth nightly. Indications: gastroesophageal reflux disease 20 mg PARoxetine 20 mg tablet Commonly known as:  PAXIL Your last dose was: Your next dose is: Take 20 mg by mouth daily. 20 mg  
    
   
   
   
  
 tiZANidine 2 mg tablet Commonly known as:  Molinda Charlee Your last dose was: Your next dose is: Take 1 mg by mouth nightly. 1 mg TYLENOL PM PO Your last dose was: Your next dose is: Take 2 Tabs by mouth nightly. 2 Tab  
    
   
   
   
  
 VITAMIN C 500 mg tablet Generic drug:  ascorbic acid (vitamin C) Your last dose was: Your next dose is: Take 500 mg by mouth daily. 500 mg  
    
   
   
   
  
 VITAMIN D3 1,000 unit tablet Generic drug:  cholecalciferol Your last dose was: Your next dose is: Take 1,000 Units by mouth daily. 1000 Units Where to Get Your Medications Information on where to get these meds will be given to you by the nurse or doctor. ! Ask your nurse or doctor about these medications  
  traMADol 50 mg tablet Opioid Education Prescription Opioids: What You Need to Know: 
 
Prescription opioids can be used to help relieve moderate-to-severe pain and are often prescribed following a surgery or injury, or for certain health conditions. These medications can be an important part of treatment but also come with serious risks. Opioids are strong pain medicines. Examples include hydrocodone, oxycodone, fentanyl, and morphine. Heroin is an example of an illegal opioid. It is important to work with your health care provider to make sure you are getting the safest, most effective care. WHAT ARE THE RISKS AND SIDE EFFECTS OF OPIOID USE? Prescription opioids carry serious risks of addiction and overdose, especially with prolonged use. An opioid overdose, often marked by slow breathing, can cause sudden death. The use of prescription opioids can have a number of side effects as well, even when taken as directed. · Tolerance-meaning you might need to take more of a medication for the same pain relief · Physical dependence-meaning you have symptoms of withdrawal when the medication is stopped. Withdrawal symptoms can include nausea, sweating, chills, diarrhea, stomach cramps, and muscle aches. Withdrawal can last up to several weeks, depending on which drug you took and how long you took it. · Increased sensitivity to pain · Constipation · Nausea, vomiting, and dry mouth · Sleepiness and dizziness · Confusion · Depression · Low levels of testosterone that can result in lower sex drive, energy, and strength · Itching and sweating RISKS ARE GREATER WITH:      
· History of drug misuse, substance use disorder, or overdose · Mental health conditions (such as depression or anxiety) · Sleep apnea · Older age (72 years or older) · Pregnancy Avoid alcohol while taking prescription opioids. Also, unless specifically advised by your health care provider, medications to avoid include: · Benzodiazepines (such as Xanax or Valium) · Muscle relaxants (such as Soma or Flexeril) · Hypnotics (such as Ambien or Lunesta) · Other prescription opioids KNOW YOUR OPTIONS Talk to your health care provider about ways to manage your pain that don't involve prescription opioids. Some of these options may actually work better and have fewer risks and side effects. Options may include: 
· Pain relievers such as acetaminophen, ibuprofen, and naproxen · Some medications that are also used for depression or seizures · Physical therapy and exercise · Counseling to help patients learn how to cope better with triggers of pain and stress. · Application of heat or cold compress · Massage therapy · Relaxation techniques Be Informed Make sure you know the name of your medication, how much and how often to take it, and its potential risks & side effects. IF YOU ARE PRESCRIBED OPIOIDS FOR PAIN: 
· Never take opioids in greater amounts or more often than prescribed. Remember the goal is not to be pain-free but to manage your pain at a tolerable level. · Follow up with your primary care provider to: · Work together to create a plan on how to manage your pain. · Talk about ways to help manage your pain that don't involve prescription opioids. · Talk about any and all concerns and side effects. · Help prevent misuse and abuse. · Never sell or share prescription opioids · Help prevent misuse and abuse. · Store prescription opioids in a secure place and out of reach of others (this may include visitors, children, friends, and family). · Safely dispose of unused/unwanted prescription opioids: Find your community drug take-back program or your pharmacy mail-back program, or flush them down the toilet, following guidance from the Food and Drug Administration (www.fda.gov/Drugs/ResourcesForYou). · Visit www.cdc.gov/drugoverdose to learn about the risks of opioid abuse and overdose. · If you believe you may be struggling with addiction, tell your health care provider and ask for guidance or call [employer] at 7-225-167-SAYE. Discharge Instructions [facility] Dr. Elise Valentin *YOU MUST AVOID SMOKING OR BEING AROUND ANYONE WHO SMOKES. AVOID ALL PRODUCTS THAT CONTAIN NICOTINE. DO NOT TAKE IBUPROFEN OR ANTI--INFLAMMATORIES, AS THESE MAY ALTER THE HEALING OF THE FUSION. ACTIVITIES : 
*The first week after surgery 1. You may be up and walking about the house. 2.  Activities around the house, such as washing dishes, fixing light meals, and your own personal care are fine. 3.  Avoid strenuous activities, such as vacuuming, lifting laundry or grocery bags. 4.  Do not lift anything heavier than 1 gallon of milk (or about 5-8 pounds). 5.  Do not bend over to  items from the ground level until 3 months post-op. *Week 2 and beyond 1. You may gradually increase your activities, but still avoid heavy lifting, pushing/pulling. 2.  Walking is the best way to rebuild strength and stamina. Start SLOWLY and gradually increase the distance a little every week. 3.  Walk at a pace that avoids fatigue or severe pain. Do not try to walk several blocks the first day! As you increase the distance, you may feel tired. If so, stop and rest.  
4.  You should be able to walk several blocks by your first clinic visit. 5.  Follow-up with Dr. Sherie Sanchez in 10-14 days. BATHING and INCISION CARE: 
1. The incision may be tender to touch or feel numb: this is normal.  
2.  Keep the incision clean and dry. Do not get incision wet for 5 days. The incision will be closed with sutures under the skin and the skin will be glued. 3.  Do not apply any lotions, ointments or oils on the incision.   
4.  If you notice any excessive swelling, redness, or persistent drainage around the incision, notify the office immediately. DRIVIN. You should not drive until after your follow-up appointment. 2.  You can be in a vehicle for short distances, but if you travel any long distance, please stop about every 30 minutes and walk/stretch. 3.  You should NEVER drive while taking narcotic medication. RETURN TO WORK : 
1. The decision to return to work will be determined on an individual basis. 2.  Many people who have a strenuous job (construction, heavy labor, etc) may need to be off work for up to 12 weeks. 3.  If you need a work note, please let us know as soon as possible, and not the same day you are planning to return to work. NUTRITION : 
1.  Good nutrition is an essential part of healing. 2.  You should eat a balanced diet each day, including fruits, vegetables, dairy products and protein. 3.  Remember to drink plenty of water. 4.  If you have not had a bowel movement within 3 days of surgery, you will need to use a laxative or suppository that can be obtained over-the-counter at your local pharmacy. BONE STIMULATOR: 
1. A spinal bone stimulator may be prescribed for you under certain situations. 2.  A nurse will call you if one has been requested and discuss its use for approximately 4-6 months post-op every day. 3.  This device assists in bone healing and fusion. MEDICATIONS - 
1. You may resume the medications you were taking before surgery, with the general exception of (or DO NOT TAKE) non-steroidal anti-inflammatory medications, such as Motrin, Aleve, Advil Naprosyn, Ibuprofen or aspirin. 2.  You will receive a prescription for pain medication at discharge from the hospital. The pain medication works best if taken before the pain becomes severe. 3.  To reduce stomach upset, always take the medication with food. 4.  Begin to wean yourself off the pain medication during the second week after discharge. 5.  If you need a refill, please call the office during working hours at least 2 days before your prescription runs out. Do not wait until your bottle is empty to call for a refill. 6.  DO NOT drive if you are taking narcotic pain medications. HOME HEALTH CARE: 
1.   A home health care service has been set-up for you to help assist you once you leave the hospital. 
2.  They will contact you either before you leave the hospital or within 24 hours once you have been discharged home. 3. A nurse will assist you with your dressing changes and a Physical Therapist with help you with your therapy needs. CALL THE OFFICE: 
? If you have severe pain unrelieved by the medications, new numbness or tingling in your legs; 
? If you have a fever of 101.0°F or greater;  
? If you notice excessive swelling, redness, or persistent drainage from the incision or IV site; The [facility] office number is [phone] from 8:00am to 5:00pm Monday through Friday. After 5:00pm, on weekends, or holidays, please leave a message with our answering service and the doctor on-call will get back to you shortly. Introducing [facility]! Winsome Stinson introduces [facility] patient portal. Now you can access parts of your medical record, email your doctor's office, and request medication refills online. 1. In your internet browser, go to [email or web address]"[email or web address]t 2. Click on the First Time User? Click Here link in the Sign In box. You will see the New Member Sign Up page. 3. Enter your [facility] Access Code exactly as it appears below. You will not need to use this code after youve completed the sign-up process. If you do not sign up before the expiration date, you must request a new code. · [facility] Access Code: [email or web address] Expires: 6/13/2018  3:13 PM 
 
4.  Enter the last four digits of your Social Security Number (xxxx) and Date of Birth (mm/dd/yyyy) as indicated and click Submit. You will be taken to the next sign-up page. 5. Create a [employer] ID. This will be your [employer] login ID and cannot be changed, so think of one that is secure and easy to remember. 6. Create a [employer] password. You can change your password at any time. 7. Enter your Password Reset Question and Answer. This can be used at a later time if you forget your password. 8. Enter your e-mail address. You will receive e-mail notification when new information is available in [address] 9. Click Sign Up. You can now view and download portions of your medical record. 10. Click the Download Summary menu link to download a portable copy of your medical information. If you have questions, please visit the Frequently Asked Questions section of the [employer] website. Remember, [employer] is NOT to be used for urgent needs. For medical emergencies, dial 911. Now available from your iPhone and Android! Introducing Anuj Gray As a Rudolelena Carbajaly patient, I wanted to make you aware of our electronic visit tool called Anuj Arashleonardofin. Ilia Jordan 24/7 allows you to connect within minutes with a medical provider 24 hours a day, seven days a week via a mobile device or tablet or logging into a secure website from your computer. You can access Anuj Gray from anywhere in the [place]. A virtual visit might be right for you when you have a simple condition and feel like you just dont want to get out of bed, or cant get away from work for an appointment, when your regular Sorenolelena Carbajaly provider is not available (evenings, weekends or holidays), or when youre out of town and need minor care. Electronic visits cost only $49 and if the MartinCennoxy 24/7 provider determines a prescription is needed to treat your condition, one can be electronically transmitted to a nearby pharmacy*. Please take a moment to enroll today if you have not already done so. The enrollment process is free and takes just a few minutes. To enroll, please download the [employer] 24/7 sarita to your tablet or phone, or visit [email or web address] to enroll on your computer. And, as an [address] [place] [address] patient with a [employer] account, the results of your visits will be scanned into your electronic medical record and your primary care provider will be able to view the scanned results. We urge you to continue to see your regular [employer] provider for your ongoing medical care. And while your primary care provider may not be the one available when you seek a AI Exchangeleonardofin virtual visit, the peace of mind you get from getting a real diagnosis real time can be priceless. For more information on [employer], view our Frequently Asked Questions (FAQs) at [email or web address]. Sincerely, 
 
Flaquita Paz MD 
Chief Medical Officer [facility] Monika Duran *:  certain medications cannot be prescribed via AI ExchangeleonardoToonTime Unresulted Labs-Please follow up with your PCP about these lab tests Order Current Status NC XR TECHNOLOGIST SERVICE In process Providers Seen During Your Hospitalization Provider Specialty Primary office phone Jessenia [place], [address] [facility] Orthopedic Surgery [phone] Your Primary Care Physician (PCP) Primary Care Physician Office Phone Office Fax Alecia Rojas [phone] You are allergic to the following No active allergies Recent Documentation Height Weight Breastfeeding? BMI OB Status Smoking Status 1.473 m 55.5 kg No 25.55 kg/m2 Postmenopausal Never Smoker Emergency Contacts Name Discharge Info Relation Home Work Mobile Tyrell Villaseñor DISCHARGE CAREGIVER [3] Spouse [3] [phone] Patient Belongings The following personal items are in your possession at time of discharge: 
  Dental Appliances: None  Visual Aid: Glasses, With patient, At bedside      Home Medications: None   Jewelry: None  Clothing: Pants, Shirt, Sweater, Undergarments, With patient (WITH SPOUSE)    Other Valuables: Eyeglasses, Wheelchair (WITH SPOUSE) Please provide this summary of care documentation to your next provider. Signatures-by signing, you are acknowledging that this After Visit Summary has been reviewed with you and you have received a copy. Patient Signature:  ____________________________________________________________ Date:  ____________________________________________________________  
  
[facility] Provider Signature:  ____________________________________________________________ Date:  ____________________________________________________________

## 2018-04-04 NOTE — IP AVS SNAPSHOT
[address] [address] 
[phone] Patient: Myra Silva MRN: [id number] ZNJ:[id number] A check bill indicates which time of day the medication should be taken. My Medications START taking these medications Instructions Each Dose to Equal  
 Morning Noon Evening Bedtime  
 traMADol 50 mg tablet Commonly known as:  ULTRAM  
   
Your last dose was: Your next dose is: Take 1 Tab by mouth every six (6) hours as needed. Max Daily Amount: 200 mg.  
 50 mg CONTINUE taking these medications Instructions Each Dose to Equal  
 Morning Noon Evening Bedtime  
 aspirin delayed-release 81 mg tablet Your last dose was: Your next dose is: Take 81 mg by mouth daily. 81 mg CALCIUM 500 + D PO Your last dose was: Your next dose is: Take 1 Tab by mouth two (2) times a day. 1 Tab CENTRUM SILVER WOMEN PO Your last dose was: Your next dose is: Take  by mouth daily. FISH OIL 1,000 mg Cap Generic drug:  omega-3 fatty acids-vitamin e Your last dose was: Your next dose is: Take 1 Cap by mouth daily. 1 Cap  
    
   
   
   
  
 gabapentin 100 mg capsule Commonly known as:  NEURONTIN Your last dose was: Your next dose is: Take 400 mg by mouth nightly. 400 mg MIRALAX 17 gram/dose powder Generic drug:  polyethylene glycol Your last dose was: Your next dose is: Take 17 g by mouth daily. 17 g  
    
   
   
   
  
 omeprazole 20 mg capsule Commonly known as:  PRILOSEC Your last dose was: Your next dose is: Take 20 mg by mouth nightly. Indications: gastroesophageal reflux disease  20 mg  
    
   
 PARoxetine 20 mg tablet Commonly known as:  PAXIL Your last dose was: Your next dose is: Take 20 mg by mouth daily. 20 mg  
    
   
   
   
  
 tiZANidine 2 mg tablet Commonly known as:  Breonna Morint Your last dose was: Your next dose is: Take 1 mg by mouth nightly. 1 mg TYLENOL PM PO Your last dose was: Your next dose is: Take 2 Tabs by mouth nightly. 2 Tab  
    
   
   
   
  
 VITAMIN C 500 mg tablet Generic drug:  ascorbic acid (vitamin C) Your last dose was: Your next dose is: Take 500 mg by mouth daily. 500 mg  
    
   
   
   
  
 VITAMIN D3 1,000 unit tablet Generic drug:  cholecalciferol Your last dose was: Your next dose is: Take 1,000 Units by mouth daily. 1000 Units Where to Get Your Medications Information on where to get these meds will be given to you by the nurse or doctor. ! Ask your nurse or doctor about these medications  
  traMADol 50 mg tablet

## 2018-04-04 NOTE — INTERVAL H&P NOTE
H&P Update:  Luis Hernández was seen and examined. History and physical has been reviewed. The patient has been examined.  There have been no significant clinical changes since the completion of the originally dated History and Physical.    Signed By: Thea Sullivan MD     April 4, 2018 11:00 AM

## 2018-04-04 NOTE — OP NOTES
Patient: Yong Moffett MRN: [id number]  SSN: [id number]    YOB: 1945  Age: 75 y.o. Sex: female        Date of Procedure: 4/4/2018   Preoperative Diagnosis: FACET ARTHROSIS,LUMBAR HERNIATED NUCLEUS PULPOSIS W/RADICULOPATHY,LUMBOSACRAL HERNIATED NUCLEUS PULPOSIS W/RADICULOPATHY,LUMBAR SIC DEGENERATION,LUMBOSACRAL [address] W/OUT MYELOPATHY OR RADICULOPATHY,LUMBAR   SPONDYLOLISTHESIS,LUMBAR STENOSIS,SCOLIOSIS LUMBAR REGION,ELEVATED CHOLESTEROL,REFLUX,DEPRESSION  Postoperative Diagnosis: FACET ARTHROSIS,LUMBAR HERNIATED NUCLEUS PULPOSIS W/RADICULOPATHY,LUMBOSACRAL HERNIATED NUCLEUS PULPOSIS W/RADICULOPATHY,LUMBAR SIC DEGENERATION,LUMBOSACRAL [address] W/OUT MYELOPATHY OR RADICULOPATHY,LUMBAR   SPONDYLOLISTHESIS,LUMBAR STENOSIS,SCOLIOSIS LUMBAR REGION,ELEVATED CHOLESTEROL,REFLUX,DEPRESSION    Procedure: Procedure(s):  L3-S1 DECOMPRESSON AND FUSION W/C-ARM **SPEC POP**  Surgeon(s) and Role:     * Esperanza Caraballo MD - Primary  Assistant: Sina Khan PA-C  Anesthesia: General   Estimated Blood Loss: 200cc  Fluids: 1000cc   Specimens: * No specimens in log *   Findings: same  Complications: none  Implants:   Implant Name Type Inv. Item Serial No.  Lot No. LRB No. Used Action   SCR PDCL RESET SLD 7.0X50MM -- GEN 3 - [id number]  SCR PDCL RESET SLD 7.0X50MM -- GEN 3  SPINE[employer]ON[employer] [id number] N/A 4 Implanted   SCR PDCL RESET SLD 7.0X45MM -- GEN 3 - [id number]  SCR PDCL RESET SLD 7.0X45MM -- GEN 3  SPINE[employer]ON[employer]ER [id number] N/A 4 Implanted   DAVON SP LORDTC PEDFUSE 5.5X80MM --  - [id number]  DAVON SP LORDTC PEDFUSE 5.5X80MM --   SPINE[employer]ON[employer]ER [id number] N/A 2 Implanted   SCR SET PEDFUSE 5-7MM --  - [id number]  SCR SET PEDFUSE 5-7MM --   SPINEFRONTIER [id number] N/A 8 Implanted   GRAFT PUTTY DBM H-GENIN 5CC --  - [id number]   GRAFT PUTTY DBM H-GENIN 5CC --  [id number] SPINEFRONTIER [id number] N/A 1 Implanted         Indications:  This is a 75y.o. year-old female who presents with significant back   and bilateral lower extremity pain, worsening ability to do activities of daily living. X-rays and MRI scan revealing severe spinal stenosis, degenerative disk disease and disk herniation. Having failed conservative care, he comes for operative intervention. DESCRIPTION OF PROCEDURE: After correct identification, the patient was   taken to the operating room, placed supine on the table, general   endotracheal anesthesia induced. 2grams of Ancef was given. Patient was then rotated prone onto the [facility] table. Lumbar spine was prepped and draped in the usual sterile fashion. Midline incision was made in the lumbar spine, taken down to the spinous processes of L3-S1. The posterior transverse processes bilaterally were expose at L3-S1 as seen on intraoperative fluoroscopy. Gelpi retractors were then placed. The wound was then irrigated. Complete wide laminectomy was performed at L4, L5 as well as the inferior   half of L3 bilaterally and the superior half of S1 bilaterally. Removal of more than 1/2 of the medial facets bilaterally allowed excellent midline decompression. Noted to have a severe central stenosis and cyst formation at the L3-4 level. Foraminotomies which included undercutting the pars intra-articularis were then performed bilaterally at L3-S1 until a Penfield 3 was easily passed through each of the neural foramen. This allowed visualization of the L3, L4, L5 and S1 nerve roots. The wound   was then irrigated. Bur was then used to open the posterior aspect of the   pedicles bilaterally at L3-S1. A gearshift probe was then placed through   each of these posterior bur holes, through the pedicle, into vertebral body   under intraoperative fluoroscopy. Ball-tipped probe was then placed through   each gearshift tracts, ensuring the gearshift had only gone through the bone.    Pedicle screws from [employer]ine[employer] spinal system were then placed bilaterally at L3-S1. Rods were then fixed to the pedicle screws using the locking caps. Each of these caps were then torqued into position. Intraoperative x-ray revealed excellent alignment of the  hardware and anatomy. Wound was then irrigated with 1000cc of pulse lavage irrigation containing Bacitracin. Bur was then used to open the posterior aspect of the facet joints and transverse process bilaterally as well as removing of the cartilage surface of the facet joints. Bone graft that was taken from the laminectomy site was then placed in the   posterolateral gutters as well as in facet joints. Drain was then placed. Fascia was then closed using #1 Vicryl suture. Subcutaneous tissue   approximated with 2-0 Vicryl suture. Skin approximated with staples. Sterile dressing was applied. The patient tolerated the procedure well and taken to Recovery room in good   condition.

## 2018-04-04 NOTE — PERIOP NOTES
TRANSFER - IN REPORT:    Verbal report received from ORN and CRNA(name) on Varinder Dickerson  being received from OR(unit) for routine progression of care      Report consisted of patients Situation, Background, Assessment and   Recommendations(SBAR). Information from the following report(s) SBAR, Kardex, OR Summary, Procedure Summary, MAR and Recent Results was reviewed with the receiving nurse. Opportunity for questions and clarification was provided. Assessment completed upon patients arrival to unit and care assumed.

## 2018-04-04 NOTE — ANESTHESIA POSTPROCEDURE EVALUATION
Post-Anesthesia Evaluation and Assessment    Cardiovascular Function/Vital Signs  Visit Vitals    /53    Pulse 62    Temp 36.6 °C (97.8 °F)    Resp (!) 31    Ht 4' 10\" (1.473 m)    Wt 55.5 kg (122 lb 4 oz)    SpO2 100%    BMI 25.55 kg/m2       Patient is status post Procedure(s):  L3-S1 DECOMPRESSON AND FUSION W/C-ARM **SPEC POP**. Nausea/Vomiting: Controlled. Postoperative hydration reviewed and adequate. Pain:  Pain Scale 1: FLACC (04/04/18 1545)  Pain Intensity 1: 0 (04/04/18 1545)   Managed. Neurological Status:   Neuro (WDL): Within Defined Limits (04/04/18 1515)   At baseline. Mental Status and Level of Consciousness: Arousable. Pulmonary Status:   O2 Device: Nasal cannula (04/04/18 1450)   Adequate oxygenation and airway patent. Complications related to anesthesia: None    Post-anesthesia assessment completed. No concerns. Patient has met all discharge requirements.     Signed By: Margaux Patrick CRNA    April 4, 2018

## 2018-04-04 NOTE — PERIOP NOTES
Reviewed PTA medication list with patient/caregiver and patient/caregiver denies any additional medications.  Patient admits to having a responsible adult care for them for at least 24 hours after surgery.     diual skin assessment performed by OMERO Carbajal RN/ Rocío SHANNON

## 2018-04-04 NOTE — PROGRESS NOTES
1920  Bedside and Verbal shift change report given to HEBER Petersen RN by Dez Valdez RN. Report included the following information SBAR, Kardex, OR Summary, Intake/Output and MAR.

## 2018-04-04 NOTE — PROGRESS NOTES
Problem: Mobility Impaired (Adult and Pediatric)  Goal: *Acute Goals and Plan of Care (Insert Text)  In 1-7 days pt will be able to perform:  STG  1. Bed mobility:  Demonstrate proper log-roll technique for safe initiation of rolling for OOB activities. 2.  Supine to sit to supine S with HR for meals. 3.  Sit to stand to sit S with RW/LSO in prep for ambulation. LT.  Gait:  Ambulate 150ft S with RW/LSO, for home/community mobility. 2.  Stair Negotiation:  Ascend/descend >3 steps CGA with HR for home entry. 3.  Activity tolerance: Tolerate up in chair 30 minutes-1 hour for ADLs. 4.  Patient/Family Education:  Patient/family to be independent with HEP for follow-up care and safe discharge. physical Therapy EVALUATION    Patient: Luis Hernández (25 y.o. female)  Date: 2018  Primary Diagnosis: FACET ARTHROSIS,LUMBAR HERNIATED NUCLEUS PULPOSIS W/RADICULOPATHY,LUMBOSACRAL HERNIATED NUCLEUS PULPOSIS W/RADICULOPATHY,LUMBAR SIC DEGENERATION,LUMBOSACRAL [facility] W/OUT MYELOPATHY OR [address] [place] [address] CHOLESTEROL,REFLUX,DEPRESSION  Spinal stenosis, lumbar  Procedure(s) (LRB):  L3-S1 DECOMPRESSON AND FUSION W/C-ARM **SPEC POP** (N/A) Day of Surgery   Precautions:   Fall, Back    ASSESSMENT :  Based on the objective data described below, the patient presents with decreased functional mobility and independence in regard to bed mobility, transfers, gt quality and tolerance, generalized strength, pain and safety due to recent bck surgery. Pt drowsy but able to follow commands. Noted pt not on O2 upon arrival and SpO2 on RA 87%, reapplied 2L O2 via NC and improved reading to 97%. Pt reports 6/10 pain on numerical pain scale. Pt instructed in log rolling, donning/doffing/use of LSO however pt unable to attend to tasks and required min/mod A for adhering to precautions and techniques.   Upon sitting EOB pt cont to be drowsy and required constant support to maintain sitting. Pt deemed too drowsy to participate further w/ PT. Pt returned to supine in bed w/ all needs within reach, SCDs applied. Nurse Ching Bowie aware. Recommend [facility] hospital d/c. Patient will benefit from skilled intervention to address the above impairments. Patients rehabilitation potential is considered to be Fair  Factors which may influence rehabilitation potential include:   []         None noted  []         Mental ability/status  []         Medical condition  []         Home/family situation and support systems  []         Safety awareness  [x]         Pain tolerance/management  []         Other:      PLAN :  Recommendations and Planned Interventions:  [x]           Bed Mobility Training             []    Neuromuscular Re-Education  [x]           Transfer Training                   []    Orthotic/Prosthetic Training  [x]           Gait Training                          []    Modalities  []           Therapeutic Exercises          []    Edema Management/Control  [x]           Therapeutic Activities            [x]    Patient and Family Training/Education  []           Other (comment):    Frequency/Duration: Patient will be followed by physical therapy twice daily to address goals. Discharge Recommendations: Home Health  Further Equipment Recommendations for Discharge: N/A     SUBJECTIVE:   Patient stated I don't feel that well.     OBJECTIVE DATA SUMMARY:     Past Medical History:   Diagnosis Date    Arthritis     Chronic pain     lower back    GERD (gastroesophageal reflux disease)     Ill-defined condition     Gita cerebella ataxia    Menopause     Age 47    Other ill-defined conditions(799.89)     Gita's Cerebellar Ataxia    Other ill-defined conditions(199.89)     Pessary    Psychiatric disorder     depression     Past Surgical History:   Procedure Laterality Date    HX CERVICAL FUSION  2012    anterior    HX GI prolapse rectum    HX HEENT      OS muscle surgery    HX ORTHOPAEDIC      Right knee ORIF    HX ORTHOPAEDIC  2014    right total hip    HX OTHER SURGICAL      repair rectal prolapse     Barriers to Learning/Limitations: None  Compensate with: visual, verbal, tactile, kinesthetic cues/model  Prior Level of Function/Home Situation:   Home Situation  Home Environment: Private residence  Wheelchair Ramp: Yes  One/Two Story Residence: One story  Living Alone: No  Support Systems: Spouse/Significant Other/Partner  Patient Expects to be Discharged to[de-identified] Private residence  Current DME Used/Available at Home: Walker, rolling, Raised toilet seat  Critical Behavior:  Neurologic State: Appropriate for age;Drowsy  Orientation Level: Oriented to person;Oriented to place;Oriented to situation  Cognition: Decreased attention/concentration;Decreased command following  Safety/Judgement: Decreased awareness of environment;Decreased insight into deficits  Psychosocial  Patient Behaviors: Calm; Cooperative  Purposeful Interaction: Yes  Pt Identified Daily Priority: Clinical issues (comment)  Caring Interventions: Reassure  Reassure: Informing;Caring rounds; Therapeutic listening; Acceptance  Skin Condition/Temp: Dry;Warm  Skin Integrity: Incision (comment) (back)  Skin Integumentary  Skin Color: Appropriate for ethnicity  Skin Condition/Temp: Dry;Warm  Skin Integrity: Incision (comment) (back)  Turgor: Non-tenting  Hair Growth: Present  Varicosities: Present  Strength:    Strength: Generally decreased, functional  Tone & Sensation:   Tone: Normal  Sensation: Intact  Range Of Motion:  AROM: Generally decreased, functional  Functional Mobility:  Bed Mobility:  Rolling: Minimum assistance; Moderate assistance; Additional time (vc)  Supine to Sit: Minimum assistance; Additional time (vc)  Sit to Supine: Moderate assistance; Additional time (vc)  Scooting: Minimum assistance (vc)  Transfers:  Balance:   Sitting: Impaired; With support  Sitting - Static: Poor (constant support)  Sitting - Dynamic: Not tested  Ambulation/Gait Training:  Therapeutic Exercises:   Pain:  Pain Scale 1: Numeric (0 - 10)  Pain Intensity 1: 4  Pain Location 1: Back  Pain Description 1: Aching  Pain Intervention(s) 1: Medication (see MAR)  Activity Tolerance:   Fair   Please refer to the flowsheet for vital signs taken during this treatment. After treatment:   []         Patient left in no apparent distress sitting up in chair  [x]         Patient left in no apparent distress in bed  [x]         Call bell left within reach  [x]         Nursing notified  []         Caregiver present  []         Bed alarm activated    COMMUNICATION/EDUCATION:   [x]         Fall prevention education was provided and the patient/caregiver indicated understanding. [x]         Patient/family have participated as able in goal setting and plan of care. [x]         Patient/family agree to work toward stated goals and plan of care. []         Patient understands intent and goals of therapy, but is neutral about his/her participation. []         Patient is unable to participate in goal setting and plan of care. Thank you for this referral.  Rinku Norris, PT   Time Calculation: 19 mins    Eval Complexity: History: HIGH Complexity :3+ comorbidities / personal factors will impact the outcome/ POC Exam:MEDIUM Complexity : 3 Standardized tests and measures addressing body structure, function, activity limitation and / or participation in recreation  Presentation: HIGH Complexity : Unstable and unpredictable characteristics  Clinical Decision Making:High Complexity amb <30' Overall Complexity:MEDIUM     Mobility  Current  CK= 40-59%   Goal  CI= 1-19%. The severity rating is based on the Level of Assistance required for Functional Mobility and ADLs.

## 2018-04-04 NOTE — ANESTHESIA PREPROCEDURE EVALUATION
Anesthetic History   No history of anesthetic complications            Review of Systems / Medical History  Patient summary reviewed, nursing notes reviewed and pertinent labs reviewed    Pulmonary  Within defined limits                 Neuro/Psych              Cardiovascular  Within defined limits                     GI/Hepatic/Renal     GERD           Endo/Other  Within defined limits           Other Findings              Physical Exam    Airway  Mallampati: II  TM Distance: 4 - 6 cm  Neck ROM: normal range of motion   Mouth opening: Normal     Cardiovascular  Regular rate and rhythm,  S1 and S2 normal,  no murmur, click, rub, or gallop             Dental  No notable dental hx       Pulmonary  Breath sounds clear to auscultation               Abdominal  GI exam deferred       Other Findings            Anesthetic Plan    ASA: 3  Anesthesia type: general          Induction: Intravenous  Anesthetic plan and risks discussed with: Family and Patient

## 2018-04-05 ENCOUNTER — APPOINTMENT (OUTPATIENT)
Dept: GENERAL RADIOLOGY | Age: 73
DRG: 460 | End: 2018-04-05
Attending: HOSPITALIST
Payer: MEDICARE

## 2018-04-05 ENCOUNTER — APPOINTMENT (OUTPATIENT)
Dept: CT IMAGING | Age: 73
DRG: 460 | End: 2018-04-05
Attending: HOSPITALIST
Payer: MEDICARE

## 2018-04-05 PROBLEM — M48.061 SPINAL STENOSIS, LUMBAR: Status: RESOLVED | Noted: 2018-03-26 | Resolved: 2018-04-05

## 2018-04-05 PROBLEM — M51.26 HNP (HERNIATED NUCLEUS PULPOSUS), LUMBAR: Status: RESOLVED | Noted: 2018-03-26 | Resolved: 2018-04-05

## 2018-04-05 PROBLEM — Z98.890 HISTORY OF BACK SURGERY: Status: ACTIVE | Noted: 2018-04-05

## 2018-04-05 PROBLEM — R65.10 SIRS (SYSTEMIC INFLAMMATORY RESPONSE SYNDROME) (HCC): Status: ACTIVE | Noted: 2018-04-05

## 2018-04-05 LAB
ANION GAP SERPL CALC-SCNC: 10 MMOL/L (ref 3–18)
BASOPHILS # BLD: 0 K/UL (ref 0–0.06)
BASOPHILS NFR BLD: 0 % (ref 0–2)
BUN SERPL-MCNC: 10 MG/DL (ref 7–18)
BUN/CREAT SERPL: 14 (ref 12–20)
CALCIUM SERPL-MCNC: 8.1 MG/DL (ref 8.5–10.1)
CHLORIDE SERPL-SCNC: 102 MMOL/L (ref 100–108)
CK MB CFR SERPL CALC: 1 % (ref 0–4)
CK MB SERPL-MCNC: 2.9 NG/ML (ref 5–25)
CK SERPL-CCNC: 278 U/L (ref 26–192)
CO2 SERPL-SCNC: 28 MMOL/L (ref 21–32)
CREAT SERPL-MCNC: 0.71 MG/DL (ref 0.6–1.3)
DIFFERENTIAL METHOD BLD: ABNORMAL
EOSINOPHIL # BLD: 0 K/UL (ref 0–0.4)
EOSINOPHIL NFR BLD: 0 % (ref 0–5)
ERYTHROCYTE [DISTWIDTH] IN BLOOD BY AUTOMATED COUNT: 13.1 % (ref 11.6–14.5)
ERYTHROCYTE [DISTWIDTH] IN BLOOD BY AUTOMATED COUNT: 13.3 % (ref 11.6–14.5)
GLUCOSE SERPL-MCNC: 134 MG/DL (ref 74–99)
HCT VFR BLD AUTO: 26.5 % (ref 35–45)
HCT VFR BLD AUTO: 29.2 % (ref 35–45)
HGB BLD-MCNC: 8.6 G/DL (ref 12–16)
HGB BLD-MCNC: 9.5 G/DL (ref 12–16)
LACTATE SERPL-SCNC: 1 MMOL/L (ref 0.4–2)
LYMPHOCYTES # BLD: 1 K/UL (ref 0.9–3.6)
LYMPHOCYTES NFR BLD: 8 % (ref 21–52)
MAGNESIUM SERPL-MCNC: 1.3 MG/DL (ref 1.6–2.6)
MCH RBC QN AUTO: 31.4 PG (ref 24–34)
MCH RBC QN AUTO: 31.5 PG (ref 24–34)
MCHC RBC AUTO-ENTMCNC: 32.5 G/DL (ref 31–37)
MCHC RBC AUTO-ENTMCNC: 32.5 G/DL (ref 31–37)
MCV RBC AUTO: 96.7 FL (ref 74–97)
MCV RBC AUTO: 96.7 FL (ref 74–97)
MONOCYTES # BLD: 1.2 K/UL (ref 0.05–1.2)
MONOCYTES NFR BLD: 10 % (ref 3–10)
NEUTS SEG # BLD: 10.1 K/UL (ref 1.8–8)
NEUTS SEG NFR BLD: 82 % (ref 40–73)
PLATELET # BLD AUTO: 227 K/UL (ref 135–420)
PLATELET # BLD AUTO: 264 K/UL (ref 135–420)
PMV BLD AUTO: 10.2 FL (ref 9.2–11.8)
PMV BLD AUTO: 10.4 FL (ref 9.2–11.8)
POTASSIUM SERPL-SCNC: 3.4 MMOL/L (ref 3.5–5.5)
RBC # BLD AUTO: 2.74 M/UL (ref 4.2–5.3)
RBC # BLD AUTO: 3.02 M/UL (ref 4.2–5.3)
SODIUM SERPL-SCNC: 140 MMOL/L (ref 136–145)
TROPONIN I SERPL-MCNC: <0.02 NG/ML (ref 0–0.06)
TSH SERPL DL<=0.05 MIU/L-ACNC: 0.8 UIU/ML (ref 0.36–3.74)
WBC # BLD AUTO: 12.2 K/UL (ref 4.6–13.2)
WBC # BLD AUTO: 13.3 K/UL (ref 4.6–13.2)

## 2018-04-05 PROCEDURE — 87040 BLOOD CULTURE FOR BACTERIA: CPT | Performed by: HOSPITALIST

## 2018-04-05 PROCEDURE — 97166 OT EVAL MOD COMPLEX 45 MIN: CPT

## 2018-04-05 PROCEDURE — 82550 ASSAY OF CK (CPK): CPT | Performed by: HOSPITALIST

## 2018-04-05 PROCEDURE — 93005 ELECTROCARDIOGRAM TRACING: CPT

## 2018-04-05 PROCEDURE — 80048 BASIC METABOLIC PNL TOTAL CA: CPT | Performed by: HOSPITALIST

## 2018-04-05 PROCEDURE — 74011250637 HC RX REV CODE- 250/637: Performed by: HOSPITALIST

## 2018-04-05 PROCEDURE — 97535 SELF CARE MNGMENT TRAINING: CPT

## 2018-04-05 PROCEDURE — 74011250636 HC RX REV CODE- 250/636: Performed by: ORTHOPAEDIC SURGERY

## 2018-04-05 PROCEDURE — 36415 COLL VENOUS BLD VENIPUNCTURE: CPT | Performed by: PHYSICIAN ASSISTANT

## 2018-04-05 PROCEDURE — 74011250636 HC RX REV CODE- 250/636: Performed by: HOSPITALIST

## 2018-04-05 PROCEDURE — 85027 COMPLETE CBC AUTOMATED: CPT | Performed by: PHYSICIAN ASSISTANT

## 2018-04-05 PROCEDURE — 84443 ASSAY THYROID STIM HORMONE: CPT | Performed by: HOSPITALIST

## 2018-04-05 PROCEDURE — 74011000250 HC RX REV CODE- 250: Performed by: HOSPITALIST

## 2018-04-05 PROCEDURE — 74011250636 HC RX REV CODE- 250/636: Performed by: PHYSICIAN ASSISTANT

## 2018-04-05 PROCEDURE — 85025 COMPLETE CBC W/AUTO DIFF WBC: CPT | Performed by: HOSPITALIST

## 2018-04-05 PROCEDURE — 74011250637 HC RX REV CODE- 250/637: Performed by: PHYSICIAN ASSISTANT

## 2018-04-05 PROCEDURE — 74011000258 HC RX REV CODE- 258: Performed by: HOSPITALIST

## 2018-04-05 PROCEDURE — 97116 GAIT TRAINING THERAPY: CPT

## 2018-04-05 PROCEDURE — 71045 X-RAY EXAM CHEST 1 VIEW: CPT

## 2018-04-05 PROCEDURE — 65660000000 HC RM CCU STEPDOWN

## 2018-04-05 PROCEDURE — 71275 CT ANGIOGRAPHY CHEST: CPT

## 2018-04-05 PROCEDURE — 83605 ASSAY OF LACTIC ACID: CPT | Performed by: HOSPITALIST

## 2018-04-05 PROCEDURE — 83735 ASSAY OF MAGNESIUM: CPT | Performed by: HOSPITALIST

## 2018-04-05 RX ORDER — DILTIAZEM HYDROCHLORIDE 5 MG/ML
10 INJECTION INTRAVENOUS ONCE
Status: DISCONTINUED | OUTPATIENT
Start: 2018-04-05 | End: 2018-04-05

## 2018-04-05 RX ORDER — TRAMADOL HYDROCHLORIDE 50 MG/1
50 TABLET ORAL
Status: DISCONTINUED | OUTPATIENT
Start: 2018-04-05 | End: 2018-04-09 | Stop reason: HOSPADM

## 2018-04-05 RX ORDER — DILTIAZEM HYDROCHLORIDE 30 MG/1
30 TABLET, FILM COATED ORAL
Status: DISCONTINUED | OUTPATIENT
Start: 2018-04-06 | End: 2018-04-05

## 2018-04-05 RX ORDER — ACETAMINOPHEN 325 MG/1
650 TABLET ORAL
Status: DISCONTINUED | OUTPATIENT
Start: 2018-04-05 | End: 2018-04-05 | Stop reason: SDUPTHER

## 2018-04-05 RX ORDER — HYDROCODONE BITARTRATE AND ACETAMINOPHEN 5; 325 MG/1; MG/1
1 TABLET ORAL
Status: DISCONTINUED | OUTPATIENT
Start: 2018-04-05 | End: 2018-04-09 | Stop reason: HOSPADM

## 2018-04-05 RX ORDER — MAGNESIUM SULFATE HEPTAHYDRATE 40 MG/ML
2 INJECTION, SOLUTION INTRAVENOUS ONCE
Status: COMPLETED | OUTPATIENT
Start: 2018-04-05 | End: 2018-04-05

## 2018-04-05 RX ORDER — TRAMADOL HYDROCHLORIDE 50 MG/1
50 TABLET ORAL
Qty: 50 TAB | Refills: 0 | Status: SHIPPED | OUTPATIENT
Start: 2018-04-05 | End: 2018-04-07

## 2018-04-05 RX ADMIN — SODIUM CHLORIDE 500 MG: 900 INJECTION, SOLUTION INTRAVENOUS at 23:01

## 2018-04-05 RX ADMIN — DIAZEPAM 2.5 MG: 5 TABLET ORAL at 01:35

## 2018-04-05 RX ADMIN — WATER 1 G: 1 INJECTION INTRAMUSCULAR; INTRAVENOUS; SUBCUTANEOUS at 21:31

## 2018-04-05 RX ADMIN — SODIUM CHLORIDE, SODIUM LACTATE, POTASSIUM CHLORIDE, AND CALCIUM CHLORIDE 125 ML/HR: 600; 310; 30; 20 INJECTION, SOLUTION INTRAVENOUS at 02:12

## 2018-04-05 RX ADMIN — SODIUM CHLORIDE 1000 ML: 900 INJECTION, SOLUTION INTRAVENOUS at 19:30

## 2018-04-05 RX ADMIN — HYDROCODONE BITARTRATE AND ACETAMINOPHEN 1 TABLET: 5; 325 TABLET ORAL at 23:37

## 2018-04-05 RX ADMIN — Medication 10 ML: at 22:00

## 2018-04-05 RX ADMIN — CEFAZOLIN SODIUM 2 G: 2 SOLUTION INTRAVENOUS at 13:10

## 2018-04-05 RX ADMIN — CEFAZOLIN SODIUM 2 G: 2 SOLUTION INTRAVENOUS at 05:20

## 2018-04-05 RX ADMIN — LUBIPROSTONE 24 MCG: 24 CAPSULE, GELATIN COATED ORAL at 08:02

## 2018-04-05 RX ADMIN — SODIUM CHLORIDE 10 MG/HR: 900 INJECTION, SOLUTION INTRAVENOUS at 18:07

## 2018-04-05 RX ADMIN — SODIUM CHLORIDE 250 ML: 900 INJECTION, SOLUTION INTRAVENOUS at 18:04

## 2018-04-05 RX ADMIN — SODIUM CHLORIDE, SODIUM LACTATE, POTASSIUM CHLORIDE, AND CALCIUM CHLORIDE 125 ML/HR: 600; 310; 30; 20 INJECTION, SOLUTION INTRAVENOUS at 11:14

## 2018-04-05 RX ADMIN — DOCUSATE SODIUM 100 MG: 100 CAPSULE, LIQUID FILLED ORAL at 21:29

## 2018-04-05 RX ADMIN — PAROXETINE HYDROCHLORIDE 20 MG: 20 TABLET, FILM COATED ORAL at 08:57

## 2018-04-05 RX ADMIN — DOCUSATE SODIUM 100 MG: 100 CAPSULE, LIQUID FILLED ORAL at 08:57

## 2018-04-05 RX ADMIN — TRAMADOL HYDROCHLORIDE 50 MG: 50 TABLET, FILM COATED ORAL at 19:29

## 2018-04-05 RX ADMIN — SODIUM CHLORIDE 1000 ML: 900 INJECTION, SOLUTION INTRAVENOUS at 23:32

## 2018-04-05 RX ADMIN — OMEPRAZOLE 20 MG: 20 CAPSULE, DELAYED RELEASE ORAL at 21:29

## 2018-04-05 RX ADMIN — TIZANIDINE 1 MG: 2 TABLET ORAL at 21:29

## 2018-04-05 RX ADMIN — POLYETHYLENE GLYCOL 3350 17 G: 17 POWDER, FOR SOLUTION ORAL at 08:57

## 2018-04-05 RX ADMIN — GABAPENTIN 400 MG: 400 CAPSULE ORAL at 21:29

## 2018-04-05 RX ADMIN — LUBIPROSTONE 24 MCG: 24 CAPSULE, GELATIN COATED ORAL at 17:16

## 2018-04-05 RX ADMIN — MAGNESIUM SULFATE HEPTAHYDRATE 2 G: 40 INJECTION, SOLUTION INTRAVENOUS at 21:50

## 2018-04-05 NOTE — CONSULTS
[address]  MR#: [id number]  : 1945  ACCOUNT #: [de-identified]   DATE OF SERVICE: 2018    REASON FOR CONSULTATION:    1. Atrial fibrillation with RVR and SIRS. 2.  Status post back surgery. 3.fever/SIRS    HISTORY OF PRESENT ILLNESS:  This is a 58-year-old lady that I was asked to see on the orthopedics floor. She had back surgery yesterday for failure to respond to conservative measures. She has a history of scoliosis and she had been having ongoing back pain for some time as well as hip pain, so she had a procedure yesterday. It was an L3-S1 decompression and fusion with C-arm and today she had been doing okay apparently until this afternoon, she developed a fever up to 102 and then the nurse noted her heart rate to be elevated. An EKG shows AFib with RVR. I went in to see the patient and she says she may have had a history of that in the past but it had been stable. She is not 100% sure. She does not have any chronic cardiac issues that she is aware of, so I do not see it in her H and P from Orthopedics. Her  was not sure either. PAST MEDICAL HISTORY:  Notable for arthritis, depression, hyperlipidemia, cerebellar ataxia, nerve disorder. PAST SURGICAL HISTORY:  She has had previous eye surgery, leg surgery, rectal prolapse surgery, spinal fusion in , cataract surgery and hip replacement in . ALLERGIES:  SHE HAS NO MEDICATION ALLERGIES. MEDICATIONS:  At home, she is on Niaspan, Paxil, Actonel, diclofenac. SOCIAL HISTORY:  Nonsmoker. Drinks a glass of wine a week and lives with her . FAMILY HISTORY:  No history of stroke, cancer or hypertension. REVIEW OF SYSTEMS:    GENERAL: She complains of feeling hot earlier, she did not have chills. RESPIRATORY:  She denies shortness of breath, wheezing or coughing. CARDIOVASCULAR:  She denies palpitations, chest pain or leg swelling.   GASTROINTESTINAL:  She has had no nausea, vomiting, diarrhea. Today, she ate okay. HEMATOLOGIC:  No bleeding or bruisability. MUSCULOSKELETAL:  She has lower back pain, but no paresthesias in her legs. NEUROLOGIC:  She denies dizziness, lightheadedness or syncopal symptoms. PHYSICAL EXAMINATION:  GENERAL:  She is awake and alert, in no acute distress. She is pleasant and conversive. She was helped up to the bedside commode by her  and urinated. VITAL SIGNS:  Her heart rate is 150, AFib. Temp is 102.5, now down to 99.8. Respiratory rate is 16, SaO2 is 93%, blood pressure 105/61. She is oriented x3, in no acute distress. She feels warm but she is not flushed. HEENT:  Her sclerae are anicteric, conjunctivae pink. Her oropharynx is clear. NECK:  Supple, no thyromegaly or lymphadenopathy. LUNGS:  Clear bilaterally. No rales, rhonchi, wheezes. CARDIAC:  Tachycardic, irregular. No murmur, rub or gallop noted. ABDOMEN:  Soft, nontender, no distention. LOWER EXTREMITIES:  Trace ankle edema. No pitting edema. Distal pulses are palpable. LABORATORY DATA:  Her white count this morning was 13.3, H and H 9.5 and 29.2, platelets are 317. We are now awaiting a urinalysis, a basic metabolic panel, two blood cultures and a chest x-ray. The chest x-ray has been done, but the result is pending. ASSESSMENT:  At this point in time, she is exhibiting no clear signs for what could be causing her fever. Potentially, this could be postoperative atelectasis. Of course, with any fever and tachycardia postop, we always consider the possibility of a blood clot. She is not hypoxic. She is not complaining of any chest pain or shortness of breath. Her saturation is 93-94% on room air. I will see what her chest x-ray looks like and monitor her fever curve.   If necessary, we will do a stat CT angiogram of her chest if she continues with any respiratory symptoms, but at this point, we plan on the assessment of the followin. Atrial fibrillation with rapid ventricular response. 2.  Systemic inflammatory response syndrome. 3.  Status post back surgery. PLAN:  Tylenol as needed for fever. Start a Cardizem drip on telemetry and transfer her. She has come up to the floor here. It is okay to continue her other medications which are currently ordered. Again, we are awaiting blood cultures, a CBC, a basic metabolic panel, a lactic acid, a magnesium, a TSH, a urinalysis and a urine culture. She is stable to be on the telemetry unit at this point in time. We will keep a close eye on her tonight. Thank you very much for the consultation. Of note, I also ordered an echocardiogram as I do not see a history of atrial fibrillation in the past.  We will follow along with you.       MD LAMBERTO Spivey / Wilma.Kylah  D: 2018 18:06     T: 2018 19:01  JOB #: [id number]  CC: Anival Ibrahim MD  [address] [address]  CC: Jorge Alberto Nuñez MD

## 2018-04-05 NOTE — PROGRESS NOTES
Problem: Falls - Risk of  Goal: *Absence of Falls  Document Amara Fall Risk and appropriate interventions in the flowsheet.    Outcome: Not Progressing Towards Goal  Fall Risk Interventions:  Mobility Interventions: Patient to call before getting OOB    Mentation Interventions: Door open when patient unattended    Medication Interventions: Assess postural VS orthostatic hypotension    Elimination Interventions: Call light in reach

## 2018-04-05 NOTE — PROGRESS NOTES
9699  Pt is awake, alert, oriented x 4. Sitting on chair with back brace on.  Bulky dressing to lower back dry and intact. Served breakfast.  0758  Pt was seen by Dr Alyssa Bee. Pt is for discharge today after Pt clearance. Galvez cath removed. Abdomen obese, soft with hypoactive BS. Moving all extremities well. Pain level 4/10 and comfortable. 8:59 AM   PT in to see pt.   9:34 AM  Pt ambulated with PT in hallway. Pt is very ataxic and very unsafe with ambulation. Pt is back in bed. Pt with Hx of Gita's cerebellar ataxia. Also has numbness to right foot, front, top and bottom. Pt recommending  Rehab. PT spoke to [address] [place] [address],[facility] Floor. Will try Pt again this afternoon. 11:23 AM   Pain level 3/10 and comfortable. Pt voided  Via BSC and has fecal smearing. Kept clean and comfortable. Pt still very ataxic. Assisted back in bed.    1:11 PM   OT in to see pt. Assisted in putting on pants. Pt has difficulty and needs assist in lifting right leg. Pt sitting on chair. 1553   Pt's VS  Temp 102.5  RR 17 O2 sat 90. Mews score 5. Pt is very hot. Pt has Room is warm  Many blankets on. Blankets removed. Sponge bath given. 1549  Temp rechecked. Temp 99.9 Pr is irregular from 100- 140s. O2 sat 96% . Mews score of 4. CARMEN Carlin was notified and RN suggested hospitalist consult and EKG Stat.   4:26 PM   CARMEN Salinas ordered Hospitalist and EKG Stat. Dr Sangeeta Maxwell aware of Hospitalist consult. 4:48 PM  EKG done. Pt with A fib with HR up to 170s. Dr Malena Habermann was made aware. With order to transfer to Tele. Nurse supervisor was made aware. 1728  Report given to Lina Carpenter RN in [facility]. 1545  Portable CXR done. Pt hooked to cardiac monitor and transferred to [facility] via bed.

## 2018-04-05 NOTE — DISCHARGE SUMMARY
Discharge Summary    Patient: Mally Eason               Sex: female          DOA: 4/4/2018         YOB: 1945      Age:  75 y.o.        LOS:  LOS: 5 day                Admit Date: 4/4/2018    Discharge Date: 4/9/2018    Admission Diagnoses: [address] W/RADICULOPATHY,LUMBOSACRAL HERNIATED NUCLEUS PULPOSIS W/RADICULOPATHY,LUMBAR SIC DEGENERATION,LUMBOSACRAL [address] W/OUT MYELOPATHY OR [address] [place] [address]  Spinal stenosis, lumbar    Discharge Diagnoses:    Problem List as of 4/5/2018  Date Reviewed: 4/4/2018          Codes Class Noted - Resolved    SIRS (systemic inflammatory response syndrome) ([facility] [email or web address]ca [email or web address].) ICD-10-CM: R65.10  ICD-9-CM: 995.90  4/5/2018 - Present        History of back surgery ICD-10-CM: Z98.890  ICD-9-CM: V45.89  4/5/2018 - Present        Spondylolisthesis of lumbar region ICD-10-CM: M43.16  ICD-9-CM: 738.4  3/29/2018 - Present        Scoliosis of thoracolumbar spine ICD-10-CM: M41.9  ICD-9-CM: 737.30  3/29/2018 - Present        DDD (degenerative disc disease), lumbar ICD-10-CM: M51.36  ICD-9-CM: 722.52  3/26/2018 - Present        Acute blood loss anemia ICD-10-CM: D62  ICD-9-CM: 285.1  5/21/2014 - Present        OA (osteoarthritis) of hip ICD-10-CM: M16.9  ICD-9-CM: 715.95  5/20/2014 - Present        Gita's cerebellar ataxia ([facility] [email or web address]]ca 75.) ICD-10-CM: G11.2  ICD-9-CM: 334.2  5/20/2014 - Present        DDD (degenerative disc disease), cervical ICD-10-CM: M50.30  ICD-9-CM: 722.4  10/4/2012 - Present        * (Principal)RESOLVED: Spinal stenosis, lumbar ICD-10-CM: M48.061  ICD-9-CM: 724.02  3/26/2018 - 4/5/2018        RESOLVED: HNP (herniated nucleus pulposus), lumbar ICD-10-CM: M51.26  ICD-9-CM: 722.10  3/26/2018 - 4/5/2018        RESOLVED: Cervical spinal stenosis ICD-10-CM: M48.02  ICD-9-CM: 723.0  10/4/2012 - 10/8/2012 RESOLVED: HNP (herniated nucleus pulposus), cervical ICD-10-CM: M50.20  ICD-9-CM: 722.0  10/4/2012 - 10/8/2012              Discharge Condition: Good    Hospital Course: The patient underwent L4-5 decompression & fusion on 4/4/2018. The patient tolerated the procedure well. Vital signs remained stable and the patient was transferred to  2nd floor surgical unit without complications. The patient remained neurovascularly intact and began getting out of bed with physical therapy on  POD #1. Pain was controlled with Dilaudid PCA and Ultram and Tylenol pills for break through pain. The PCA pump and hoang catheter were discontinued on POD#1. Late afternoon on POD #1 she developed fever and tachycardia. EKG revealed Afib with rapid ventricular response, no previous history. Hospitalist and Caridiology consulted. She was transferred to telemetry and Cardizem drip started. Cardiac Echo has been ordered. Blood cultures were negative. It was felt fever was from systemic inflammatory response,and atelectasis. She is now back in sinus rhythm on Metoprolol and Digoxin,with intermittent A fib. The drain was discontinued on POD#2. The patient progressed slowly with physical therapy ,ambulating 5 feet on POD#4. Due to the slow progress with P.T, the decision for SNF placement has been made and the patient will be transferred to [facility] on POD #5. Transportation to SNF will be required due to unsteady gait and need of assistance when ambulating. Consults: Hospitalist and Cardiology. Significant Diagnostic Studies:   Recent Labs      04/05/18   1750  04/05/18   0207   HGB  8.6*  9.5*     Recent Labs      04/05/18   1750  04/05/18   0207   HCT  26.5*  29.2*       Current Discharge Medication List      START taking these medications    Details   digoxin (LANOXIN) 0.125 mg tablet Take 1 Tab by mouth daily.   Qty: 30 Tab, Refills: 0      metoprolol tartrate (LOPRESSOR) 25 mg tablet Take 1 Tab by mouth every twelve (12) hours.  Qty: 60 Tab, Refills: 0      traMADol (ULTRAM) 50 mg tablet Take 1 Tab by mouth every six (6) hours as needed. Max Daily Amount: 200 mg. Qty: 50 Tab, Refills: 0    Associated Diagnoses: Spinal stenosis of lumbar region with neurogenic claudication         CONTINUE these medications which have NOT CHANGED    Details   gabapentin (NEURONTIN) 100 mg capsule Take 400 mg by mouth nightly. PARoxetine (PAXIL) 20 mg tablet Take 20 mg by mouth daily. polyethylene glycol (MIRALAX) 17 gram/dose powder Take 17 g by mouth daily. tiZANidine (ZANAFLEX) 2 mg tablet Take 1 mg by mouth nightly. omeprazole (PRILOSEC) 20 mg capsule Take 20 mg by mouth nightly. Indications: gastroesophageal reflux disease      aspirin delayed-release 81 mg tablet Take 81 mg by mouth daily. CALCIUM CARBONATE/VITAMIN D3 (CALCIUM 500 + D PO) Take 1 Tab by mouth two (2) times a day. MULTIVIT-MIN/IRON/FOLIC/LUTEIN (CENTRUM SILVER WOMEN PO) Take  by mouth daily. ACETAMINOPHEN/DIPHENHYDRAMINE (TYLENOL PM PO) Take 2 Tabs by mouth nightly. ascorbic acid (VITAMIN C) 500 mg tablet Take 500 mg by mouth daily. cholecalciferol (VITAMIN D3) 1,000 unit tablet Take 1,000 Units by mouth daily. omega-3 fatty acids-vitamin e (FISH OIL) 1,000 mg Cap Take 1 Cap by mouth daily. Activity: activity as tolerated, weight bearing as tolerated. Wear lumbar brace when out of bed. No lifting over 20 pounds. No anti- inflammatory medications for 3 months. Use stool softeners at home while taking pain medications since  they are constipating. Diet: Regular Diet    Wound Care: Keep wound clean and dry, Reinforce dressing PRN and ice to area for comfort. Do not get wound wet for 5 days. Follow-up: 10 days with Dr Jesús Farr. F/U with Cardiology in one month.

## 2018-04-05 NOTE — PROGRESS NOTES
Problem: Falls - Risk of  Goal: *Absence of Falls  Document Amara Fall Risk and appropriate interventions in the flowsheet.    Outcome: Progressing Towards Goal  Fall Risk Interventions:  Mobility Interventions: Patient to call before getting OOB, Utilize walker, cane, or other assitive device    Mentation Interventions: Adequate sleep, hydration, pain control    Medication Interventions: Assess postural VS orthostatic hypotension, Patient to call before getting OOB, Teach patient to arise slowly    Elimination Interventions: Call light in reach, Patient to call for help with toileting needs

## 2018-04-05 NOTE — PROGRESS NOTES
20:05 Assessment completed. Lungs are clear bilat. Back dsg remains C/D/I with + CMS & + PP. Denies numbness or tingling in LE's. Dorsi/plantar flexion & extension remains = but weak. Resting quietly in bed with  @ bedside. 22:45 Shift assessment completed. See nsg flow sheet for details. 01:15 Woke up & was very confused after receiving Norco @ 23:15. Attempting to remove dsg from back, IV, & drain. Reassured pt & stayed @ bedside for along time. Re-oriented & cool washcloth for forehead for headache.    02:55 Reassessed with 0 changes noted. Back to baseline orientation. Back dsg remains C/D/Iwith + CMS & + PP. Continues to deny numbness or tingling in LE's Repositioned for comfort. Resting quietly in bed with eyes closed between cares. 06:35 H-vac dc'd with tip intact & verified by Ezequiel Wiseman. RN. Up in the chair with phone & call bell within reach. 07:25 Bedside and Verbal shift change report given to Ivone Rico RN (oncoming nurse) by Carmelita Bajwa RN (offgoing nurse). Report included the following information SBAR.

## 2018-04-05 NOTE — PROGRESS NOTES
Problem: Mobility Impaired (Adult and Pediatric)  Goal: *Acute Goals and Plan of Care (Insert Text)  In 1-7 days pt will be able to perform:  STG  1. Bed mobility:  Demonstrate proper log-roll technique for safe initiation of rolling for OOB activities. 2.  Supine to sit to supine S with HR for meals. 3.  Sit to stand to sit S with RW/LSO in prep for ambulation. LT.  Gait:  Ambulate 150ft S with RW/LSO, for home/community mobility. 2.  Activity tolerance: Tolerate up in chair 30 minutes-1 hour for ADL's.  3.  Patient/Family Education:  Patient/family to be independent with HEP for follow-up care and safe discharge. Goals revised 2018 to remove stair goal as patient has ramp and does not do stairs. PT session held due to:  []  Nausea/vomiting  [x]  RN Communication/ suggestion: increase HR. Nursing calling MD. \" Trent Buys move her right now\"    []  Extreme Pain  []  Dialysis treatment in progress. Will f/u tomorrow. . Thank you.   Yosef Hensley, PTA

## 2018-04-05 NOTE — PROGRESS NOTES
1930 Received bedside report from [address]. Assumed patient care. Physician made aware by previous nurse and bolus started. Patient in room asymptomatic. Just complaining of pain. Tramadol administered. 2300 patient complained of pain. No relief from tramadol. Paged on hospitalist for pain. 1 Alerted by the CNA that patient BP and patient hypoxic. 2345 Rechecked patient BP 87/40. Dr. Cindy Reeves on the floor, checked and assessed the patient. Patient converted to SR Cardizem stopped. STAT CTA  and bolus Ns 1000mL ordered. 2350 NS bolus started, New IV site accessed for CTA.    0000 Patient off unit for CTA    0010 patient returned to the unit. 0118 Bolus completed, patient BP up to 90/52.      0319  Patient BP diastolic in the 45L. Rechecked manually, BP 90/40. No urine output since last bolus. Paged Dr. Cindy Reeves for update. Orders received. Bladder scan completed and patient managed to void, NS started at 100mL/hr.     0440 Patient went into Afib for 18 secs, converted back to SR    0505 patient went into a flutter for 80 sec, and back to SR.      0600 Patient clinical unstable through the night. Continuous communication with hospitalist with interventions completed. Bedside and Verbal shift change report given to Christoph Mcgraw RN (oncoming nurse) by Lyla Maher (offgoing nurse). Report included the following information SBAR, Kardex and MAR.

## 2018-04-05 NOTE — PROGRESS NOTES
Progress Note POD #1      Patient: Arnol Green               Sex: female          DOA: 4/4/2018         YOB: 1945      Surgery: Procedure(s):  L3-S1 DECOMPRESSON AND FUSION W/C-ARM **SPEC POP**           LOS: 1 day               Subjective:     No new complaints. Denies leg pain. Wants Tylenol for pain. Objective:      Visit Vitals    /53 (BP 1 Location: Right arm, BP Patient Position: Sitting)    Pulse 79    Temp 98.1 °F (36.7 °C)    Resp 18    Ht 4' 10\" (1.473 m)    Wt 55.5 kg (122 lb 4 oz)    SpO2 95%    Breastfeeding No    BMI 25.55 kg/m2       Physical Exam:  Neurological: motor strength: 5/5 in lower extremities bilaterally                          sensation: intact to light touch  Sitting in  Chair, eating breakfast.  Patient mobility  Bed Mobility Training  Rolling: Minimum assistance, Moderate assistance, Additional time (vc)  Supine to Sit: Minimum assistance, Additional time (vc)  Sit to Supine: Moderate assistance, Additional time (vc)  Scooting: Minimum assistance (vc)                      Intake and Output:  Current Shift:     Last three shifts:  04/03 1901 - 04/05 0700  In: 4122 [P.O.:500;  I.V.:3622]  Out: 2250 [Urine:1900; Drains:150]    Lab/Data Reviewed:    Lab/Data Reviewed:  Lab Results   Component Value Date/Time    WBC 13.3 (H) 04/05/2018 02:07 AM    HGB 9.5 (L) 04/05/2018 02:07 AM    HCT 29.2 (L) 04/05/2018 02:07 AM    PLATELET 077 39/29/3526 02:07 AM    MCV 96.7 04/05/2018 02:07 AM     Lab Results   Component Value Date/Time    aPTT 27.5 05/13/2014 11:00 AM     Lab Results   Component Value Date/Time    INR 1.0 05/13/2014 11:00 AM    Prothrombin time 12.9 05/13/2014 11:00 AM            Assessment/Plan     Principal Problem:    Spinal stenosis, lumbar (3/26/2018)    Active Problems:    DDD (degenerative disc disease), lumbar (3/26/2018)      HNP (herniated nucleus pulposus), lumbar (3/26/2018)      Spondylolisthesis of lumbar region (3/29/2018) Scoliosis of thoracolumbar spine (3/29/2018)        1. Stable  2. OOB with PT  3. D/C Planning  4. D/C PCA  5. D/C hoang  6. D/C drain & change dressing prior to discharge home.   7.Discharge to home after being cleared by P.T

## 2018-04-05 NOTE — PROGRESS NOTES
Problem: Mobility Impaired (Adult and Pediatric)  Goal: *Acute Goals and Plan of Care (Insert Text)  In 1-7 days pt will be able to perform:  STG  1. Bed mobility:  Demonstrate proper log-roll technique for safe initiation of rolling for OOB activities. 2.  Supine to sit to supine S with HR for meals. 3.  Sit to stand to sit S with RW/LSO in prep for ambulation. LT.  Gait:  Ambulate 150ft S with RW/LSO, for home/community mobility. 2.  Stair Negotiation:  Ascend/descend >3 steps CGA with HR for home entry. 3.  Activity tolerance: Tolerate up in chair 30 minutes-1 hour for ADLs. 4.  Patient/Family Education:  Patient/family to be independent with HEP for follow-up care and safe discharge. physical Therapy TREATMENT    Patient: Dona Mccracken (65 y.o. female)  Date: 2018  Diagnosis: FACET ARTHROSIS,LUMBAR HERNIATED NUCLEUS PULPOSIS W/RADICULOPATHY,LUMBOSACRAL HERNIATED NUCLEUS PULPOSIS W/RADICULOPATHY,LUMBAR SIC DEGENERATION,LUMBOSACRAL [facility] W/OUT MYELOPATHY OR [address] [place] [address]  Spinal stenosis, lumbar Spinal stenosis, lumbar  Procedure(s) (LRB):  L3-S1 DECOMPRESSON AND FUSION W/C-ARM **SPEC POP** (N/A) 1 Day Post-Op  Precautions: Fall, Back   Chart, physical therapy assessment, plan of care and goals were reviewed. ASSESSMENT:  Patient seated on EOB, assisted to apply LSO. Patient  present, pt c/o back pain of 4/10. Sit to stand from elevated bed with mod A, UE tremoring noted. Patient ambulated 27' with very ataxic gait pattern, mod/max a of 1-2. Patient lifting RW, pushing it too far forward, max A of PT to control RW. Patient instructed log roll technique for sit to supine, required min A with BLE's. Patient left on right side, SCD's in place, pillow between legs for comfort.  Patient reporting that R foot is numb top and bottom, front part of foot, notified MIRTHA Boo. Do not anticipate patient will clear PT, patient and  willing for her to go to rehab. Patient is at high risk of fall in current status if discharged to home. Called Karin MORALES with update on patient, she would like us to report back after p.m. session. Progression toward goals:  []      Improving appropriately and progressing toward goals  [x]      Improving slowly and progressing toward goals  []      Not making progress toward goals and plan of care will be adjusted     PLAN:  Patient continues to benefit from skilled intervention to address the above impairments. Continue treatment per established plan of care. Discharge Recommendations:  Rehab  Further Equipment Recommendations for Discharge:  N/A     SUBJECTIVE:   Patient stated I feel alright.     OBJECTIVE DATA SUMMARY:   Critical Behavior:  Neurologic State: Alert, Eyes open spontaneously  Orientation Level: Oriented X4  Cognition: Appropriate decision making, Appropriate for age attention/concentration, Appropriate safety awareness, Follows commands  Safety/Judgement: Decreased awareness of environment, Decreased insight into deficits  Functional Mobility Training:  Bed Mobility:  Sit to Supine: Minimum assistance  Scooting: Stand-by assistance  Transfers:  Sit to Stand: Moderate assistance (elevated bed)  Stand to Sit: Contact guard assistance  Balance:  Sitting: Intact  Standing: With support; Impaired  Standing - Static: Constant support; Fair  Standing - Dynamic : Poor  Ambulation/Gait Training:  Distance (ft): 30 Feet (ft)  Assistive Device: Walker, rolling;Gait belt;Brace/Splint  Ambulation - Level of Assistance: Moderate assistance;Maximum assistance;Assist x2  Gait Abnormalities: Ataxic;Decreased step clearance; Step to gait  Base of Support: Narrowed  Stance: Time;Weight shift  Speed/Sheila: Slow  Step Length: Right shortened;Left shortened  Swing Pattern: Right asymmetrical;Left asymmetrical    Pain:  Pain Scale 1: Numeric (0 - 10)  Pain Intensity 1: 4  Pain Location 1: Back  Pain Orientation 1: Lower;Mid  Pain Description 1: Aching  Pain Intervention(s) 1: Position  Activity Tolerance:   fair  Please refer to the flowsheet for vital signs taken during this treatment.   After treatment:   [] Patient left in no apparent distress sitting up in chair  [x] Patient left in no apparent distress in bed  [x] Call bell left within reach  [x] Nursing notified  [x] Caregiver present  [] Bed alarm activated      Clover Limb, PT   Time Calculation: 20 mins

## 2018-04-05 NOTE — PROGRESS NOTES
Chart reviewed, noted PT recommendations, met with pt and spouse in room. Pt states she plans discharge home, is agreeable to rehab if needed. FOC offered, pt chose [facility]  7709 for follow up vs [facility] if unable to clear PT for discharge home; referrals placed to both. [facility] able to assist and has started insurance approval for pt, anticipating discharge tomorrow if she does not clear PT today or tomorrow. Pt has DME for home. CM following to finalize plan. Pt will need to bring her Actonel to SNF, will confirm with spouse if SNF discharge. Care Management Interventions  PCP Verified by CM:  Yes  Transition of Care Consult (CM Consult): SNF  [address]: No  Reason Outside [place]: Physician referred to specific agency ([facility])  Partner SNF: Yes  Discharge Durable Medical Equipment: Yes (pt declined RW, states she has one at home)  Physical Therapy Consult: Yes  Occupational Therapy Consult: Yes  Current Support Network: Lives with Spouse, Own Home  Confirm Follow Up Transport: Family  Plan discussed with Pt/Family/Caregiver: Yes  Freedom of Choice Offered: Yes  Discharge Location  Discharge Placement: Home with home health (vs SNF)

## 2018-04-05 NOTE — PROGRESS NOTES
1728 TRANSFER - IN REPORT:    Verbal report received from Shala Graham RN(name) on [employer]  being received from [address] [place] [address](unit) for change in patient condition(A fib)      Report consisted of patients Situation, Background, Assessment and   Recommendations(SBAR). Information from the following report(s) SBAR was reviewed with the receiving nurse. Opportunity for questions and clarification was provided. Assessment completed upon patients arrival to unit and care assumed. [phone] patient arrived to unit via bed in stable condition. Telemetry monitor placed on patient, telemetry protocols, hourly rounding and white board explained to patient and spouse. 1807 Cardizem gtt initiated with 250cc NS bolus per MD order without any complications. Dr. La Prince in to discuss plan of care with patient and spouse, understanding verbalized. Patient is currently sitting up on side of bed with back brace in place consuming dinner without any N/V or gastric distention. Lumbar incision remain intact with staples, well approximated without any bruising or hematomas, no s/s of any distress, VSS, will continue to monitor. 1930 Notified Dr. Delmis Hoffman of patient's SBP's decreasing into the low 90's while receiving 10mg Cardizem. Received orders to administer 1000ml NS bolus and decrease Cardizem to 5mg/ml, orders carried out accordingly. Patient is currently resting quietly in bed, c/o incisional pain. PRN pain medication administered per patient's request. Family remain at bedside, patient made aware of medication changes. Patient's HR decreased from 160's to 110's after initiation of 2nd fluid bolus. 1948 Bedside and Verbal shift change report given to Alexis Lara RN (oncoming nurse) by Juanito Green RN (offgoing nurse). Report included the following information SBAR.

## 2018-04-05 NOTE — DISCHARGE INSTRUCTIONS
[facility]  Dr. Miek Cotton ANYONE WHO SMOKES. AVOID ALL PRODUCTS THAT CONTAIN NICOTINE. DO NOT TAKE IBUPROFEN OR ANTI--INFLAMMATORIES, AS THESE MAY ALTER THE HEALING OF THE FUSION. ACTIVITIES :  *The first week after surgery   1. You may be up and walking about the house. 2.  Activities around the house, such as washing dishes, fixing light meals, and your own personal care are fine. 3.  Avoid strenuous activities, such as vacuuming, lifting laundry or grocery bags. 4.  Do not lift anything heavier than 1 gallon of milk (or about 5-8 pounds). 5.  Do not bend over to  items from the ground level until 3 months post-op. *Week 2 and beyond  1. You may gradually increase your activities, but still avoid heavy lifting, pushing/pulling. 2.  Walking is the best way to rebuild strength and stamina. Start SLOWLY and gradually increase the distance a little every week. 3.  Walk at a pace that avoids fatigue or severe pain. Do not try to walk several blocks the first day! As you increase the distance, you may feel tired. If so, stop and rest.   4.  You should be able to walk several blocks by your first clinic visit. 5.  Follow-up with Dr. Cesar Junior in 10-14 days. BATHING and INCISION CARE:  1. The incision may be tender to touch or feel numb: this is normal.   2.  Keep the incision clean and dry. Do not get incision wet for 5 days. The incision will be closed with sutures under the skin and the skin will be glued. 3.  Do not apply any lotions, ointments or oils on the incision. 4.  If you notice any excessive swelling, redness, or persistent drainage around the incision, notify the office immediately. DRIVIN. You should not drive until after your follow-up appointment. 2.  You can be in a vehicle for short distances, but if you travel any long distance, please stop about every 30 minutes and walk/stretch. 3.  You should NEVER drive while taking narcotic medication. RETURN TO WORK :  1. The decision to return to work will be determined on an individual basis. 2.  Many people who have a strenuous job (construction, heavy labor, etc) may need to be off work for up to 12 weeks. 3.  If you need a work note, please let us know as soon as possible, and not the same day you are planning to return to work. NUTRITION :  1.  Good nutrition is an essential part of healing. 2.  You should eat a balanced diet each day, including fruits, vegetables, dairy products and protein. 3.  Remember to drink plenty of water. 4.  If you have not had a bowel movement within 3 days of surgery, you will need to use a laxative or suppository that can be obtained over-the-counter at your local pharmacy. BONE STIMULATOR:  1. A spinal bone stimulator may be prescribed for you under certain situations. 2.  A nurse will call you if one has been requested and discuss its use for approximately 4-6 months post-op every day. 3.  This device assists in bone healing and fusion. MEDICATIONS -  1. You may resume the medications you were taking before surgery, with the general exception of (or DO NOT TAKE) non-steroidal anti-inflammatory medications, such as Motrin, Aleve, Advil Naprosyn, Ibuprofen or aspirin. 2.  You will receive a prescription for pain medication at discharge from the hospital. The pain medication works best if taken before the pain becomes severe. 3.  To reduce stomach upset, always take the medication with food. 4.  Begin to wean yourself off the pain medication during the second week after discharge. 5.  If you need a refill, please call the office during working hours at least 2 days before your prescription runs out. Do not wait until your bottle is empty to call for a refill. 6.  DO NOT drive if you are taking narcotic pain medications.     HOME HEALTH CARE:  1.   A home health care service has been set-up for you to help assist you once you leave the hospital.  2.  They will contact you either before you leave the hospital or within 24 hours once you have been discharged home. 3. A nurse will assist you with your dressing changes and a Physical Therapist with help you with your therapy needs. CALL THE OFFICE:   If you have severe pain unrelieved by the medications, new numbness or tingling in your legs;   If you have a fever of 101.0°F or greater;    If you notice excessive swelling, redness, or persistent drainage from the incision or IV site; The [facility] office number is [phone] from 8:00am to 5:00pm Monday through Friday. After 5:00pm, on weekends, or holidays, please leave a message with our answering service and the doctor on-call will get back to you shortly.

## 2018-04-05 NOTE — PROGRESS NOTES
Problem: Self Care Deficits Care Plan (Adult)  Goal: *Acute Goals and Plan of Care (Insert Text)  Short Term Goals 4/5/18:  Tomas Hines OTR/L  In 3 days:  1. Pt will perform LB dressing tasks with min A using AE as needed in order to increase independence with self care. 2.  Pt will perform toilet transfers and toilet routine with min A using AD/DME as needed in order to increase independence with self care. 3.  Pt will improve standing tolerance to 3 minutes without LOB at sink in order to perform grooming tasks with min A. Outcome: Progressing Towards Goal  Occupational Therapy EVALUATION    Patient: Agatha Still (64 y.o. female)  Date: 4/5/2018  Primary Diagnosis: FACET ARTHROSIS,LUMBAR HERNIATED NUCLEUS PULPOSIS W/RADICULOPATHY,LUMBOSACRAL HERNIATED NUCLEUS PULPOSIS W/RADICULOPATHY,LUMBAR SIC DEGENERATION,LUMBOSACRAL [address] [facility] W/OUT MYELOPATHY OR [address] [place] [address]  Spinal stenosis, lumbar  Procedure(s) (LRB):  L3-S1 DECOMPRESSON AND FUSION W/C-ARM **SPEC POP** (N/A) 1 Day Post-Op   Precautions:   WBAT, Spinal, Fall, Back    ASSESSMENT :  Based on the objective data described below, the patient presents with decreased functional strength, decreased functional balance, decreased overall activity tolerance limiting independence with ADLs. At baseline patient reports being independent with all self care and mobility tasks using DME/AD but admits to falling usually once a week. 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 Today patient needed moderate assist with transfers and max A with toileting & LB dressing. Pt is a very high fall risk with poor balance and impulsivity. Pt would benefit from continued OT services to maximize function. Recommend SNF.     Education:fall prevention; balance recovery; safety; spinal prec    Patient will benefit from skilled intervention to address the above impairments. Patients rehabilitation potential is considered to be Good  Factors which may influence rehabilitation potential include:   []             None noted  []             Mental ability/status  []             Medical condition  [x]             Home/family situation and support systems  [x]             Safety awareness  [x]             Pain tolerance/management  []             Other:      PLAN :  Recommendations and Planned Interventions:  [x]               Self Care Training                  [x]        Therapeutic Activities  [x]               Functional Mobility Training    []        Cognitive Retraining  [x]               Therapeutic Exercises           [x]        Endurance Activities  [x]               Balance Training                   [x]        Neuromuscular Re-Education  []               Visual/Perceptual Training     [x]   Home Safety Training  [x]               Patient Education                 [x]        Family Training/Education  []               Other (comment):    Frequency/Duration: Patient will be followed by occupational therapy 3 times a week to address goals. Discharge Recommendations: Alfredo Dyer  Further Equipment Recommendations for Discharge: bedside commode and rolling walker     SUBJECTIVE:   Patient stated I am thinking about rehab.     OBJECTIVE DATA SUMMARY:     Past Medical History:   Diagnosis Date    Arthritis     Chronic pain     lower back    GERD (gastroesophageal reflux disease)     Ill-defined condition     Gita cerebella ataxia    Menopause     Age 47    Other ill-defined conditions(799.89)     Gita's Cerebellar Ataxia    Other ill-defined conditions(799.89)     Pessary    Psychiatric disorder     depression     Past Surgical History:   Procedure Laterality Date    HX CERVICAL FUSION  2012    anterior    HX GI      prolapse rectum    HX HEENT      OS muscle surgery    HX ORTHOPAEDIC      Right knee ORIF    HX ORTHOPAEDIC 2014    right total hip    HX OTHER SURGICAL      repair rectal prolapse     Barriers to Learning/Limitations: yes;  emotional  Compensate with: visual, verbal, tactile, kinesthetic cues/model    G-Codes (GP)  Self Care   Current  CL= 60-79%   Goal  CJ= 20-39%  The severity rating is based on the professional judgement & direct observation of Level of Assistance required for Functional Mobility and ADLs. Eval Complexity: History: LOW Complexity : Brief history review ; Examination: MEDIUM Complexity : 3-5 performance deficits relating to physical, cognitive , or psychosocial skils that result in activity limitations and / or participation restrictions; Decision Making:MEDIUM Complexity : Patient may present with comorbidities that affect occupational performnce. Miniml to moderate modification of tasks or assistance (eg, physical or verbal ) with assesment(s) is necessary to enable patient to complete evaluation     Prior Level of Function/Home Situation: Pt lives with . Pt reports independent with self care and mobility using AD/DME as needed but admits to hx of falls. Home Situation  Home Environment: Private residence  Wheelchair Ramp: Yes  One/Two Story Residence: One story  Living Alone: No  Support Systems: Spouse/Significant Other/Partner  Patient Expects to be Discharged to[de-identified] Skilled nursing facility  Current DME Used/Available at Home: Shower chair, Grab bars, Commode, bedside, Walker, rolling, Raised toilet seat, Wheelchair  Tub or Shower Type: Shower  [x]  Right hand dominant   []  Left hand dominant  Cognitive/Behavioral Status:  Neurologic State: Alert; Appropriate for age  Orientation Level: Oriented X4  Cognition: Decreased attention/concentration  Safety/Judgement: Decreased awareness of need for safety  Skin: site of incision back  Edema: mild LEs  Vision/Perceptual:    Corrective Lenses: Glasses  Balance:  Sitting: Intact  Standing: Impaired; With support  Standing - Static: Fair  Standing - Dynamic : Poor  Strength:  Strength: Within functional limits (bilateral UEs)  Tone & Sensation:  Sensation: Impaired (numbness R foot)  Range of Motion:  AROM: Within functional limits (bilateral UEs)  Functional Mobility and Transfers for ADLs:  Bed Mobility:  Rolling: Supervision  Supine to Sit: Minimum assistance  Sit to Supine: Minimum assistance  Scooting: Supervision  Transfers:  Sit to Stand: Minimum assistance  Bed to Chair: Moderate assistance    Toilet Transfer : Moderate assistance     Bathroom Mobility: Maximum assistance  ADL Assessment:   Oral Facial Hygiene/Grooming: Moderate assistance  Upper Body Dressing: Moderate assistance  Lower Body Dressing: Maximum assistance  Toileting: Maximum assistance   ADL Intervention:     Upper Body Dressing Assistance  Orthotics(Brace): Maximum assistance    Lower Body Dressing Assistance  Underpants: Maximum assistance  Pants With Elastic Waist: Maximum assistance  Socks: Stand-by assistance;Maximum assistance    Toileting  Toileting Assistance: Moderate assistance  Bladder Hygiene: Minimum assistance  Clothing Management: Maximum assistance  Cues: Tactile cues provided;Verbal cues provided;Visual cues provided  Adaptive Equipment: Walker    Cognitive Retraining  Safety/Judgement: Decreased awareness of need for safety    Pain:  Pre-treatment: 4/10  Post-treatment: 4/10  Activity Tolerance:   Fair; unsteady; requires rest breaks; standing tolerance limited to 1-2 minutes  Please refer to the flowsheet for vital signs taken during this treatment. After treatment:   [x] Patient left in no apparent distress sitting up in chair  [] Patient left in no apparent distress in bed  [x] Call bell left within reach  [x] Nursing notified  [] Caregiver present  [] Bed alarm activated    COMMUNICATION/EDUCATION:   [] Home safety education was provided and the patient/caregiver indicated understanding.   [x] Patient/family have participated as able in goal setting and plan of care. [x] Patient/family agree to work toward stated goals and plan of care. [] Patient understands intent and goals of therapy, but is neutral about his/her participation. [] Patient is unable to participate in goal setting and plan of care.     Thank you for this referral.  Sabino Powell OT  Time Calculation: 48 mins

## 2018-04-06 PROBLEM — I48.91 NEW ONSET A-FIB (HCC): Status: ACTIVE | Noted: 2018-04-06

## 2018-04-06 PROBLEM — J98.11 ATELECTASIS: Status: ACTIVE | Noted: 2018-04-06

## 2018-04-06 LAB
ANION GAP SERPL CALC-SCNC: 9 MMOL/L (ref 3–18)
BUN SERPL-MCNC: 7 MG/DL (ref 7–18)
BUN/CREAT SERPL: 11 (ref 12–20)
CALCIUM SERPL-MCNC: 7.7 MG/DL (ref 8.5–10.1)
CHLORIDE SERPL-SCNC: 104 MMOL/L (ref 100–108)
CO2 SERPL-SCNC: 27 MMOL/L (ref 21–32)
CREAT SERPL-MCNC: 0.61 MG/DL (ref 0.6–1.3)
ERYTHROCYTE [DISTWIDTH] IN BLOOD BY AUTOMATED COUNT: 13.4 % (ref 11.6–14.5)
GLUCOSE SERPL-MCNC: 123 MG/DL (ref 74–99)
HCT VFR BLD AUTO: 24.8 % (ref 35–45)
HGB BLD-MCNC: 8 G/DL (ref 12–16)
MCH RBC QN AUTO: 31.4 PG (ref 24–34)
MCHC RBC AUTO-ENTMCNC: 32.3 G/DL (ref 31–37)
MCV RBC AUTO: 97.3 FL (ref 74–97)
PLATELET # BLD AUTO: 195 K/UL (ref 135–420)
PMV BLD AUTO: 9.8 FL (ref 9.2–11.8)
POTASSIUM SERPL-SCNC: 3.4 MMOL/L (ref 3.5–5.5)
RBC # BLD AUTO: 2.55 M/UL (ref 4.2–5.3)
SODIUM SERPL-SCNC: 140 MMOL/L (ref 136–145)
WBC # BLD AUTO: 13.5 K/UL (ref 4.6–13.2)

## 2018-04-06 PROCEDURE — 77030027138 HC INCENT SPIROMETER -A

## 2018-04-06 PROCEDURE — 93306 TTE W/DOPPLER COMPLETE: CPT

## 2018-04-06 PROCEDURE — 97530 THERAPEUTIC ACTIVITIES: CPT

## 2018-04-06 PROCEDURE — 74011250637 HC RX REV CODE- 250/637: Performed by: HOSPITALIST

## 2018-04-06 PROCEDURE — 85027 COMPLETE CBC AUTOMATED: CPT | Performed by: HOSPITALIST

## 2018-04-06 PROCEDURE — 77010033678 HC OXYGEN DAILY

## 2018-04-06 PROCEDURE — 36415 COLL VENOUS BLD VENIPUNCTURE: CPT | Performed by: HOSPITALIST

## 2018-04-06 PROCEDURE — 74011250636 HC RX REV CODE- 250/636: Performed by: ORTHOPAEDIC SURGERY

## 2018-04-06 PROCEDURE — 74011250637 HC RX REV CODE- 250/637: Performed by: PHYSICIAN ASSISTANT

## 2018-04-06 PROCEDURE — 93005 ELECTROCARDIOGRAM TRACING: CPT

## 2018-04-06 PROCEDURE — 74011636320 HC RX REV CODE- 636/320: Performed by: ORTHOPAEDIC SURGERY

## 2018-04-06 PROCEDURE — 80048 BASIC METABOLIC PNL TOTAL CA: CPT | Performed by: HOSPITALIST

## 2018-04-06 PROCEDURE — 97116 GAIT TRAINING THERAPY: CPT

## 2018-04-06 PROCEDURE — 65660000000 HC RM CCU STEPDOWN

## 2018-04-06 PROCEDURE — 74011250636 HC RX REV CODE- 250/636: Performed by: HOSPITALIST

## 2018-04-06 PROCEDURE — 74011000250 HC RX REV CODE- 250: Performed by: HOSPITALIST

## 2018-04-06 RX ORDER — SODIUM CHLORIDE 9 MG/ML
75 INJECTION, SOLUTION INTRAVENOUS CONTINUOUS
Status: DISCONTINUED | OUTPATIENT
Start: 2018-04-06 | End: 2018-04-06

## 2018-04-06 RX ORDER — METOPROLOL TARTRATE 25 MG/1
12.5 TABLET, FILM COATED ORAL EVERY 12 HOURS
Status: DISCONTINUED | OUTPATIENT
Start: 2018-04-07 | End: 2018-04-07

## 2018-04-06 RX ORDER — POTASSIUM CHLORIDE 20 MEQ/1
20 TABLET, EXTENDED RELEASE ORAL
Status: COMPLETED | OUTPATIENT
Start: 2018-04-06 | End: 2018-04-06

## 2018-04-06 RX ORDER — ENOXAPARIN SODIUM 100 MG/ML
1 INJECTION SUBCUTANEOUS EVERY 12 HOURS
Status: DISCONTINUED | OUTPATIENT
Start: 2018-04-06 | End: 2018-04-07

## 2018-04-06 RX ORDER — GUAIFENESIN 100 MG/5ML
81 LIQUID (ML) ORAL DAILY
Status: DISCONTINUED | OUTPATIENT
Start: 2018-04-06 | End: 2018-04-09 | Stop reason: HOSPADM

## 2018-04-06 RX ORDER — SODIUM CHLORIDE, SODIUM LACTATE, POTASSIUM CHLORIDE, CALCIUM CHLORIDE 600; 310; 30; 20 MG/100ML; MG/100ML; MG/100ML; MG/100ML
500 INJECTION, SOLUTION INTRAVENOUS ONCE
Status: COMPLETED | OUTPATIENT
Start: 2018-04-06 | End: 2018-04-06

## 2018-04-06 RX ORDER — DIGOXIN 0.25 MG/ML
250 INJECTION INTRAMUSCULAR; INTRAVENOUS
Status: COMPLETED | OUTPATIENT
Start: 2018-04-06 | End: 2018-04-06

## 2018-04-06 RX ADMIN — WATER 1 G: 1 INJECTION INTRAMUSCULAR; INTRAVENOUS; SUBCUTANEOUS at 22:25

## 2018-04-06 RX ADMIN — PAROXETINE HYDROCHLORIDE 20 MG: 20 TABLET, FILM COATED ORAL at 09:39

## 2018-04-06 RX ADMIN — IOPAMIDOL 100 ML: 755 INJECTION, SOLUTION INTRAVENOUS at 00:05

## 2018-04-06 RX ADMIN — SODIUM CHLORIDE 500 MG: 900 INJECTION, SOLUTION INTRAVENOUS at 22:33

## 2018-04-06 RX ADMIN — POTASSIUM CHLORIDE 20 MEQ: 20 TABLET, EXTENDED RELEASE ORAL at 13:55

## 2018-04-06 RX ADMIN — DOCUSATE SODIUM 100 MG: 100 CAPSULE, LIQUID FILLED ORAL at 22:24

## 2018-04-06 RX ADMIN — Medication 10 ML: at 13:56

## 2018-04-06 RX ADMIN — ACETAMINOPHEN 650 MG: 325 TABLET ORAL at 04:02

## 2018-04-06 RX ADMIN — SODIUM CHLORIDE, SODIUM LACTATE, POTASSIUM CHLORIDE, AND CALCIUM CHLORIDE 500 ML: 600; 310; 30; 20 INJECTION, SOLUTION INTRAVENOUS at 08:17

## 2018-04-06 RX ADMIN — ASPIRIN 81 MG 81 MG: 81 TABLET ORAL at 13:55

## 2018-04-06 RX ADMIN — DOCUSATE SODIUM 100 MG: 100 CAPSULE, LIQUID FILLED ORAL at 09:39

## 2018-04-06 RX ADMIN — TIZANIDINE 1 MG: 2 TABLET ORAL at 22:24

## 2018-04-06 RX ADMIN — SODIUM CHLORIDE 100 ML/HR: 900 INJECTION, SOLUTION INTRAVENOUS at 04:02

## 2018-04-06 RX ADMIN — Medication 10 ML: at 22:26

## 2018-04-06 RX ADMIN — ENOXAPARIN SODIUM 60 MG: 40 INJECTION SUBCUTANEOUS at 17:25

## 2018-04-06 RX ADMIN — DIGOXIN 250 MCG: 250 INJECTION, SOLUTION INTRAMUSCULAR; INTRAVENOUS; PARENTERAL at 09:34

## 2018-04-06 RX ADMIN — HYDROCODONE BITARTRATE AND ACETAMINOPHEN 1 TABLET: 5; 325 TABLET ORAL at 14:25

## 2018-04-06 RX ADMIN — OMEPRAZOLE 20 MG: 20 CAPSULE, DELAYED RELEASE ORAL at 22:24

## 2018-04-06 RX ADMIN — LUBIPROSTONE 24 MCG: 24 CAPSULE, GELATIN COATED ORAL at 17:25

## 2018-04-06 RX ADMIN — LUBIPROSTONE 24 MCG: 24 CAPSULE, GELATIN COATED ORAL at 09:39

## 2018-04-06 RX ADMIN — POLYETHYLENE GLYCOL 3350 17 G: 17 POWDER, FOR SOLUTION ORAL at 09:39

## 2018-04-06 RX ADMIN — Medication 10 ML: at 06:00

## 2018-04-06 RX ADMIN — GABAPENTIN 400 MG: 400 CAPSULE ORAL at 22:24

## 2018-04-06 NOTE — ROUTINE PROCESS
Bedside and Verbal shift change report given to HE Waggoner RN (oncoming nurse) by BHARAT Sanchez (offgoing nurse). Report included the following information SBAR, Kardex, MAR and Recent Results.

## 2018-04-06 NOTE — PROGRESS NOTES
Problem: Mobility Impaired (Adult and Pediatric)  Goal: *Acute Goals and Plan of Care (Insert Text)  In 1-7 days pt will be able to perform:  STG  1. Bed mobility:  Demonstrate proper log-roll technique for safe initiation of rolling for OOB activities. 2.  Supine to sit to supine S with HR for meals. 3.  Sit to stand to sit S with RW/LSO in prep for ambulation. LT.  Gait:  Ambulate 150ft S with RW/LSO, for home/community mobility. 2.  Activity tolerance: Tolerate up in chair 30 minutes-1 hour for ADL's.  3.  Patient/Family Education:  Patient/family to be independent with HEP for follow-up care and safe discharge. Goals revised 2018 to remove stair goal as patient has ramp and does not do stairs. Outcome: Progressing Towards Goal  physical Therapy TREATMENT    Patient: Elvis Wilson (87 y.o. female)  Date: 2018  Diagnosis: FACET ARTHROSIS,LUMBAR HERNIATED NUCLEUS PULPOSIS W/RADICULOPATHY,LUMBOSACRAL HERNIATED NUCLEUS PULPOSIS W/RADICULOPATHY,LUMBAR SIC DEGENERATION,LUMBOSACRAL [address] [facility] W/OUT MYELOPATHY OR [address] [place] [address]  Spinal stenosis, lumbar Spinal stenosis, lumbar  Procedure(s) (LRB):  L3-S1 DECOMPRESSON AND FUSION W/C-ARM **SPEC POP** (N/A) 2 Days Post-Op  Precautions: WBAT, Spinal, Fall, Back   Chart, physical therapy assessment, plan of care and goals were reviewed. ASSESSMENT:  Pt assisted to EOB with minimal assistance, due to poor UE coordination. Pt was able to increase ambulation on 2 trials, but limited by Right LE numbness and ataxia. Pt posteriorly kicks periodically during swing phase of R LE. RW spacing requires frequent cuing and physical assist. Placement needed to improve independence.     Progression toward goals:  []      Improving appropriately and progressing toward goals  [x]      Improving slowly and progressing toward goals  []      Not making progress toward goals and plan of care will be adjusted     PLAN:  Patient continues to benefit from skilled intervention to address the above impairments. Continue treatment per established plan of care. Discharge Recommendations:  Alfredo Dyer  Further Equipment Recommendations for Discharge:  bedside commode and rolling walker     SUBJECTIVE:   Patient stated I want to try.     OBJECTIVE DATA SUMMARY:   Critical Behavior:  Neurologic State: Alert  Orientation Level: Oriented X4  Cognition: Follows commands  Safety/Judgement: Decreased awareness of need for safety  Functional Mobility Training:  Bed Mobility:  Rolling: Supervision  Supine to Sit: Minimum assistance  Transfers:  Sit to Stand: Contact guard assistance  Stand to Sit: Contact guard assistance  Balance:  Sitting: Intact  Sitting - Static: Fair (occasional)  Sitting - Dynamic: Fair (occasional)  Standing: Impaired  Standing - Static: Fair  Standing - Dynamic : Poor  Ambulation/Gait Training:  Distance (ft): 70 Feet (ft) (30 + 40)  Assistive Device: Walker, rolling;Gait belt;Brace/Splint  Ambulation - Level of Assistance: Minimal assistance; Moderate assistance  Gait Abnormalities: Ataxic;Decreased step clearance; Step to gait  Base of Support: Narrowed  Speed/Sheila: Slow  Step Length: Right shortened;Left shortened  Pain:  Pain Scale 1: Visual  Pain Intensity 1: 0  Pain Location 1: Back  Pain Orientation 1: Lower  Pain Description 1: Aching  Pain Intervention(s) 1: Medication (see MAR)  Activity Tolerance:   Fair  Please refer to the flowsheet for vital signs taken during this treatment.   After treatment:   [] Patient left in no apparent distress sitting up in chair  [x] Patient left in no apparent distress in bed  [x] Call bell left within reach  [x] Nursing notified  [x] Caregiver present  [] Bed alarm activated      Get Downing PTA   Time Calculation: 45 mins

## 2018-04-06 NOTE — PROGRESS NOTES
Problem: Falls - Risk of  Goal: *Absence of Falls  Document Amara Fall Risk and appropriate interventions in the flowsheet.    Outcome: Progressing Towards Goal  Fall Risk Interventions:  Mobility Interventions: Bed/chair exit alarm, Patient to call before getting OOB, OT consult for ADLs    Mentation Interventions: Adequate sleep, hydration, pain control, More frequent rounding    Medication Interventions: Utilize gait belt for transfers/ambulation, Teach patient to arise slowly, Patient to call before getting OOB    Elimination Interventions: Call light in reach

## 2018-04-06 NOTE — PROGRESS NOTES
Progress Note POD #      Patient: David Head               Sex: female          DOA: 4/4/2018         YOB: 1945      Surgery: Procedure(s):  L3-S1 DECOMPRESSON AND FUSION W/C-ARM **SPEC POP**           LOS: 2 days               Subjective:     No new complaints    Objective:      Visit Vitals    BP 90/40 (BP 1 Location: Right arm, BP Patient Position: At rest;Lying left side)    Pulse 73    Temp 99.6 °F (37.6 °C)    Resp 18    Ht 4' 10\" (1.473 m)    Wt 63.6 kg (140 lb 3.4 oz)    SpO2 94%    Breastfeeding No    BMI 29.3 kg/m2       Physical Exam:  Neurological: motor strength: 5/5 in lower extremities bilaterally                          sensation: intact to light touch  Patient mobility  Bed Mobility Training  Rolling: Supervision  Supine to Sit: Minimum assistance  Sit to Supine: Minimum assistance  Scooting: Supervision  Transfer Training  Sit to Stand: Minimum assistance  Stand to Sit: Minimum assistance  Bed to Chair: Moderate assistance      Gait Training  Assistive Device: Walker, rolling, Gait belt, Brace/Splint  Ambulation - Level of Assistance: Moderate assistance, Maximum assistance, Assist x2  Distance (ft): 30 Feet (ft)            Intake and Output:  Current Shift:     Last three shifts:  04/04 1901 - 04/06 0700  In: 6853 [P.O.:1090;  I.V.:2072]  Out: 2940 [Urine:2760; Drains:100]    Lab/Data Reviewed:    Lab/Data Reviewed:  Lab Results   Component Value Date/Time    WBC 13.5 (H) 04/06/2018 04:58 AM    HGB 8.0 (L) 04/06/2018 04:58 AM    HCT 24.8 (L) 04/06/2018 04:58 AM    PLATELET 724 30/11/2362 04:58 AM    MCV 97.3 (H) 04/06/2018 04:58 AM     Lab Results   Component Value Date/Time    aPTT 27.5 05/13/2014 11:00 AM     Lab Results   Component Value Date/Time    INR 1.0 05/13/2014 11:00 AM    Prothrombin time 12.9 05/13/2014 11:00 AM            Assessment/Plan     Active Problems:    DDD (degenerative disc disease), lumbar (3/26/2018)      Spondylolisthesis of lumbar region (3/29/2018)      Scoliosis of thoracolumbar spine (3/29/2018)      SIRS (systemic inflammatory response syndrome) ([facility] [address] (4/5/2018)      History of back surgery (4/5/2018)        1. Stable orthopaedically  2. When cleared by medicine for BP:   OOB with PT  3. D/C Planning per medicine  4.  Change dressing daily

## 2018-04-06 NOTE — PROGRESS NOTES
Chart reviewed. Pt has been accepted at [facility]. Pt is not medically ready for discharge at this point. Message sent to [place]. Met with patient and pts spouse at bedside and they verbalized understanding of dc plan. DC Plan:  DC to [facility], once medically stable. [facility] at [address] [place], [place]. Report to [phone]. Please include all hard scripts for controlled substances, med rec and dc summary in packet. Please medicate for pain prior to dc if possible and needed to help offset delay when patient first arrives to facility. [address] with Deyanira at [facility] [phone]. Rebeca states pt can discharge tomorrow, Sat 4/7/18 if medically stable (unable to accept on Sunday).

## 2018-04-06 NOTE — PROGRESS NOTES
[facility] patient care from off going nurse Giovanny Ty RN. Patient is alert x 4 resting in bed and appears to be in no sign of distress. Bed left in lowest position with call bell left within reach.

## 2018-04-06 NOTE — ROUTINE PROCESS
Entered EMR to assist primary nurse Rocío with care of patient. 0000: Transported pt to CT with RN. Pt in no severe distress. Will monitor. 0010: Pt returned to the unit. See Primary RN documentation.

## 2018-04-06 NOTE — PROGRESS NOTES
D/c plan: anticipate rehab  Met wit patient and her family at bedside during IDR's. Patient is not ready for d/c today. However, telephone call with [facility] at [facility] if patient is ready for d/c tomorrow she can take her tomorrow RN must call report prior to patient leaving [place] [facility]. If patient is not ready for d/c tomorrow she is not able to accomodate her until Monday  RN if patient is ready for dc on 4/7/2018 she will need transportation to [place] [address]  RN please call report before patient leaves to go to [facility] at [address] [place], [place]. Report to [phone]. Please include all hard scripts for controlled substances, med rec and dc summary in packet. Please medicate for pain prior to dc if possible and needed to help offset delay when patient first arrives to facility. Care Management Interventions  PCP Verified by CM:  Yes  Mode of Transport at Discharge: S  Transition of Care Consult (CM Consult): SNF  [address]: No  [facility] Physician referred to specific agency ([facility])  Partner SNF: Yes  Discharge Durable Medical Equipment: Yes (pt declined RW, states she has one at home)  Physical Therapy Consult: Yes  Occupational Therapy Consult: Yes  Current Support Network: Lives with Spouse  Confirm Follow Up Transport: Family  Plan discussed with Pt/Family/Caregiver: Yes  Freedom of Choice Offered: Yes  Discharge Location  Discharge Placement: Skilled nursing facility

## 2018-04-06 NOTE — PROGRESS NOTES
Problem: Mobility Impaired (Adult and Pediatric)  Goal: *Acute Goals and Plan of Care (Insert Text)  In 1-7 days pt will be able to perform:  STG  1. Bed mobility:  Demonstrate proper log-roll technique for safe initiation of rolling for OOB activities. 2.  Supine to sit to supine S with HR for meals. 3.  Sit to stand to sit S with RW/LSO in prep for ambulation. LT.  Gait:  Ambulate 150ft S with RW/LSO, for home/community mobility. 2.  Activity tolerance: Tolerate up in chair 30 minutes-1 hour for ADL's.  3.  Patient/Family Education:  Patient/family to be independent with HEP for follow-up care and safe discharge. Goals revised 2018 to remove stair goal as patient has ramp and does not do stairs. Outcome: Not Progressing Towards Goal  PT session held due to:  [x]  Elevated HR   []  RN Communication/ suggestion  []  Extreme Pain  []  Dialysis treatment in progress. Will f/u after Echo, which is also waiting for HR decrease. Thank you.   Jacquelyn Graham, PTA

## 2018-04-06 NOTE — ROUTINE PROCESS
0466 assumed care of pt after bedside verbal report was given by off going nurse, pt resting in bed awake, no c/o pain, normal saline infusing at 100 ml/hr, will monitor     0825 bp noted to be 86/44 and heart rate 120s,pt in and out of atrial fibrillation, ekg done, lactated ringers bolus started and pt placed in trendelenburg position, page sent out for Dr Chema Price to notify of changes, pt resting quietly      0850 LR bolus completed, vitals obtained, pt resting quietly, Dr Chema Price notified of above changes, pt to be order digoxin for heart rate control, will monitor closely    0934 pt sitting up on the side of the bed eating breakfast, MT notified tht pt's -140s, digoxin 250 micrograms given via PIV over 5 mins, will monitor     0951 notified by MT that pt was back in a Sinus Rhythm, pt resting in bed quietly, no distress noted    1219 pt off unit for 2decho    1425 pt medicated for pain to back (see MAR), will monitor     1529 Bedside and Verbal shift change report given to Rachid Louie RN (oncoming nurse) by Anuj Gray RN (offgoing nurse). Report included the following information SBAR, Kardex and MAR.

## 2018-04-06 NOTE — PROGRESS NOTES
Hospitalist Progress Note    Patient: Yong Moffett MRN: [id number]  CSN: [id number]    YOB: 1945  Age: 75 y.o.   Sex: female    DOA: 4/4/2018 LOS:  LOS: 2 days          Chief Complaint:    afib with RVR      Assessment/Plan     afib with RVR-new onset  Fever and SIRS resolved  S/p back surgery  Atelectasis  Ac blood loss anemia  hypokalemia    Start lovenox BID as ok per surgery  afib is variable, and she has converted back and forth-dig given this am, cardizem stopped overnight due to low BP  Hydrated with 3 liters IVF overnight  empiric IV abx started, but cx thus far are negative and no recurrent fever    Cardiology consult  CTA chest neg for PE  Echo, TSH  Keep on tele  lovenox BID as ok per surgery-she is 48 hrs post-op    Kdur PO     Monitor BP and follow clinical response    Discussed with family on rounds      Disposition :  Patient Active Problem List   Diagnosis Code    DDD (degenerative disc disease), cervical M50.30    OA (osteoarthritis) of hip M16.9    Gita's cerebellar ataxia ([address] G11.2    Acute blood loss anemia D62    DDD (degenerative disc disease), lumbar M51.36    Spondylolisthesis of lumbar region M43.16    Scoliosis of thoracolumbar spine M41.9    SIRS (systemic inflammatory response syndrome) ([address] R65.10    History of back surgery Z98.890    Atelectasis J98.11    New onset a-fib ([address] I48.91       Subjective:    Denies CP, SOB  Seen last night also for afib that converted to SR but becam hypoxic-CTA stat ordered-it was neg for PE      Review of systems:    Constitutional: denies fevers, chills, myalgias  Respiratory: denies SOB  Cardiovascular: denies chest pain, palpitations  Gastrointestinal: denies nausea, vomiting      Vital signs/Intake and Output:  Visit Vitals    BP 92/51 (BP 1 Location: Right arm, BP Patient Position: At rest)    Pulse (!) 106    Temp 98.2 °F (36.8 °C)    Resp 16    Ht 4' 10\" (1.473 m)    Wt 63.6 kg (140 lb 3.4 oz)    SpO2 95%  Breastfeeding No    BMI 29.3 kg/m2     Current Shift:  04/06 0701 - 04/06 1900  In: -   Out: 4320 [GHXVW:7184]  Last three shifts:  04/04 1901 - 04/06 0700  In: 5558 [P.O.:1090; I.V.:2072]  Out: 2940 [Urine:2760; Drains:100]    Exam:    General: thin frail WF, NAD, ox3  Head/Neck: NCAT, supple, No masses, No lymphadenopathy  CVS:tachy irregular, no MRG  Lungs:Clear to auscultation bilaterally, no wheezes, rhonchi, or rales  Abdomen: Soft, Nontender, No distention, Normal Bowel sounds, No hepatomegaly  Extremities: No C/C/E, pulses palpable 2+  Neuro:grossly normal , follows commands  Psych:appropriate                Labs: Results:       Chemistry Recent Labs      04/06/18 0458 04/05/18   1750   GLU  123*  134*   NA  140  140   K  3.4*  3.4*   CL  104  102   CO2  27  28   BUN  7  10   CREA  0.61  0.71   CA  7.7*  8.1*   AGAP  9  10   BUCR  11*  14      CBC w/Diff Recent Labs      04/06/18   0458 04/05/18   1750  04/05/18   0207   WBC  13.5*  12.2  13.3*   RBC  2.55*  2.74*  3.02*   HGB  8.0*  8.6*  9.5*   HCT  24.8*  26.5*  29.2*   PLT  195  227  264   GRANS   --   82*   --    LYMPH   --   8*   --    EOS   --   0   --       Cardiac Enzymes Recent Labs      04/05/18   1750   CPK  278*   CKND1  1.0      Coagulation No results for input(s): PTP, INR, APTT in the last 72 hours. No lab exists for component: INREXT    Lipid Panel No results found for: CHOL, CHOLPOCT, CHOLX, CHLST, CHOLV, 354974, HDL, LDL, LDLC, DLDLP, 582522, VLDLC, VLDL, TGLX, TRIGL, TRIGP, TGLPOCT, CHHD, CHHDX   BNP No results for input(s): BNPP in the last 72 hours. Liver Enzymes No results for input(s): TP, ALB, TBIL, AP, SGOT, GPT in the last 72 hours.     No lab exists for component: DBIL   Thyroid Studies Lab Results   Component Value Date/Time    TSH 0.80 04/05/2018 05:50 PM        Procedures/imaging: see electronic medical records for all procedures/Xrays and details which were not copied into this note but were reviewed prior to creation of Arleth Jackson MD

## 2018-04-07 LAB
ANION GAP SERPL CALC-SCNC: 8 MMOL/L (ref 3–18)
APPEARANCE UR: CLEAR
ATRIAL RATE: 122 BPM
BACTERIA URNS QL MICRO: ABNORMAL /HPF
BILIRUB UR QL: NEGATIVE
BUN SERPL-MCNC: 10 MG/DL (ref 7–18)
BUN/CREAT SERPL: 20 (ref 12–20)
CALCIUM SERPL-MCNC: 8.1 MG/DL (ref 8.5–10.1)
CALCULATED R AXIS, ECG10: 55 DEGREES
CALCULATED T AXIS, ECG11: -39 DEGREES
CHLORIDE SERPL-SCNC: 104 MMOL/L (ref 100–108)
CO2 SERPL-SCNC: 28 MMOL/L (ref 21–32)
COLOR UR: YELLOW
CREAT SERPL-MCNC: 0.49 MG/DL (ref 0.6–1.3)
DIAGNOSIS, 93000: NORMAL
DIGOXIN SERPL-MCNC: 0.3 NG/ML (ref 0.9–2)
EPITH CASTS URNS QL MICRO: ABNORMAL /LPF (ref 0–5)
ERYTHROCYTE [DISTWIDTH] IN BLOOD BY AUTOMATED COUNT: 13 % (ref 11.6–14.5)
GLUCOSE SERPL-MCNC: 114 MG/DL (ref 74–99)
GLUCOSE UR STRIP.AUTO-MCNC: NEGATIVE MG/DL
HCT VFR BLD AUTO: 24.7 % (ref 35–45)
HGB BLD-MCNC: 8 G/DL (ref 12–16)
HGB UR QL STRIP: NEGATIVE
KETONES UR QL STRIP.AUTO: ABNORMAL MG/DL
LEUKOCYTE ESTERASE UR QL STRIP.AUTO: NEGATIVE
MCH RBC QN AUTO: 31.6 PG (ref 24–34)
MCHC RBC AUTO-ENTMCNC: 32.4 G/DL (ref 31–37)
MCV RBC AUTO: 97.6 FL (ref 74–97)
NITRITE UR QL STRIP.AUTO: NEGATIVE
PH UR STRIP: 5.5 [PH] (ref 5–8)
PLATELET # BLD AUTO: 211 K/UL (ref 135–420)
PMV BLD AUTO: 10.1 FL (ref 9.2–11.8)
POTASSIUM SERPL-SCNC: 3.4 MMOL/L (ref 3.5–5.5)
PROT UR STRIP-MCNC: 30 MG/DL
Q-T INTERVAL, ECG07: 256 MS
QRS DURATION, ECG06: 80 MS
QTC CALCULATION (BEZET), ECG08: 408 MS
RBC # BLD AUTO: 2.53 M/UL (ref 4.2–5.3)
SODIUM SERPL-SCNC: 140 MMOL/L (ref 136–145)
SP GR UR REFRACTOMETRY: 1.03 (ref 1–1.03)
UROBILINOGEN UR QL STRIP.AUTO: 1 EU/DL (ref 0.2–1)
VENTRICULAR RATE, ECG03: 153 BPM
WBC # BLD AUTO: 11 K/UL (ref 4.6–13.2)
WBC URNS QL MICRO: ABNORMAL /HPF (ref 0–5)

## 2018-04-07 PROCEDURE — 74011250636 HC RX REV CODE- 250/636: Performed by: HOSPITALIST

## 2018-04-07 PROCEDURE — 87086 URINE CULTURE/COLONY COUNT: CPT | Performed by: HOSPITALIST

## 2018-04-07 PROCEDURE — 80162 ASSAY OF DIGOXIN TOTAL: CPT | Performed by: HOSPITALIST

## 2018-04-07 PROCEDURE — 97530 THERAPEUTIC ACTIVITIES: CPT

## 2018-04-07 PROCEDURE — 74011250637 HC RX REV CODE- 250/637: Performed by: HOSPITALIST

## 2018-04-07 PROCEDURE — 74011250637 HC RX REV CODE- 250/637: Performed by: PHYSICIAN ASSISTANT

## 2018-04-07 PROCEDURE — 80048 BASIC METABOLIC PNL TOTAL CA: CPT | Performed by: HOSPITALIST

## 2018-04-07 PROCEDURE — 81001 URINALYSIS AUTO W/SCOPE: CPT | Performed by: HOSPITALIST

## 2018-04-07 PROCEDURE — 74011250637 HC RX REV CODE- 250/637: Performed by: INTERNAL MEDICINE

## 2018-04-07 PROCEDURE — 36415 COLL VENOUS BLD VENIPUNCTURE: CPT | Performed by: HOSPITALIST

## 2018-04-07 PROCEDURE — 97116 GAIT TRAINING THERAPY: CPT

## 2018-04-07 PROCEDURE — 77030011256 HC DRSG MEPILEX <16IN NO BORD MOLN -A

## 2018-04-07 PROCEDURE — 77010033678 HC OXYGEN DAILY

## 2018-04-07 PROCEDURE — 74011000250 HC RX REV CODE- 250: Performed by: HOSPITALIST

## 2018-04-07 PROCEDURE — 85027 COMPLETE CBC AUTOMATED: CPT | Performed by: PHYSICIAN ASSISTANT

## 2018-04-07 PROCEDURE — 65660000000 HC RM CCU STEPDOWN

## 2018-04-07 RX ORDER — METOPROLOL TARTRATE 25 MG/1
25 TABLET, FILM COATED ORAL EVERY 12 HOURS
Status: DISCONTINUED | OUTPATIENT
Start: 2018-04-07 | End: 2018-04-09 | Stop reason: HOSPADM

## 2018-04-07 RX ORDER — POTASSIUM CHLORIDE 20 MEQ/1
20 TABLET, EXTENDED RELEASE ORAL
Status: COMPLETED | OUTPATIENT
Start: 2018-04-07 | End: 2018-04-07

## 2018-04-07 RX ORDER — TRAMADOL HYDROCHLORIDE 50 MG/1
50 TABLET ORAL
Qty: 50 TAB | Refills: 0 | Status: SHIPPED | OUTPATIENT
Start: 2018-04-07 | End: 2021-12-11

## 2018-04-07 RX ORDER — DIGOXIN 0.25 MG/ML
250 INJECTION INTRAMUSCULAR; INTRAVENOUS
Status: COMPLETED | OUTPATIENT
Start: 2018-04-07 | End: 2018-04-07

## 2018-04-07 RX ORDER — METOPROLOL TARTRATE 25 MG/1
12.5 TABLET, FILM COATED ORAL ONCE
Status: COMPLETED | OUTPATIENT
Start: 2018-04-07 | End: 2018-04-07

## 2018-04-07 RX ORDER — DIGOXIN 125 MCG
0.12 TABLET ORAL DAILY
Status: DISCONTINUED | OUTPATIENT
Start: 2018-04-08 | End: 2018-04-09 | Stop reason: HOSPADM

## 2018-04-07 RX ADMIN — DOCUSATE SODIUM 100 MG: 100 CAPSULE, LIQUID FILLED ORAL at 20:32

## 2018-04-07 RX ADMIN — ASPIRIN 81 MG 81 MG: 81 TABLET ORAL at 09:11

## 2018-04-07 RX ADMIN — WATER 1 G: 1 INJECTION INTRAMUSCULAR; INTRAVENOUS; SUBCUTANEOUS at 20:31

## 2018-04-07 RX ADMIN — GABAPENTIN 400 MG: 400 CAPSULE ORAL at 21:06

## 2018-04-07 RX ADMIN — PAROXETINE HYDROCHLORIDE 20 MG: 20 TABLET, FILM COATED ORAL at 09:10

## 2018-04-07 RX ADMIN — Medication 10 ML: at 06:29

## 2018-04-07 RX ADMIN — HYDROCODONE BITARTRATE AND ACETAMINOPHEN 1 TABLET: 5; 325 TABLET ORAL at 14:07

## 2018-04-07 RX ADMIN — Medication 10 ML: at 18:51

## 2018-04-07 RX ADMIN — DIGOXIN 250 MCG: 250 INJECTION, SOLUTION INTRAMUSCULAR; INTRAVENOUS; PARENTERAL at 11:59

## 2018-04-07 RX ADMIN — POTASSIUM CHLORIDE 20 MEQ: 20 TABLET, EXTENDED RELEASE ORAL at 12:00

## 2018-04-07 RX ADMIN — METOPROLOL TARTRATE 12.5 MG: 25 TABLET ORAL at 14:30

## 2018-04-07 RX ADMIN — METOPROLOL TARTRATE 12.5 MG: 25 TABLET ORAL at 09:11

## 2018-04-07 RX ADMIN — ENOXAPARIN SODIUM 60 MG: 40 INJECTION SUBCUTANEOUS at 06:28

## 2018-04-07 RX ADMIN — LUBIPROSTONE 24 MCG: 24 CAPSULE, GELATIN COATED ORAL at 18:50

## 2018-04-07 RX ADMIN — Medication 10 ML: at 21:06

## 2018-04-07 RX ADMIN — LUBIPROSTONE 24 MCG: 24 CAPSULE, GELATIN COATED ORAL at 09:11

## 2018-04-07 RX ADMIN — OMEPRAZOLE 20 MG: 20 CAPSULE, DELAYED RELEASE ORAL at 21:06

## 2018-04-07 RX ADMIN — SODIUM CHLORIDE 500 MG: 900 INJECTION, SOLUTION INTRAVENOUS at 21:00

## 2018-04-07 RX ADMIN — POLYETHYLENE GLYCOL 3350 17 G: 17 POWDER, FOR SOLUTION ORAL at 09:11

## 2018-04-07 RX ADMIN — DOCUSATE SODIUM 100 MG: 100 CAPSULE, LIQUID FILLED ORAL at 09:10

## 2018-04-07 NOTE — PROGRESS NOTES
Cardiology Progress Note        Patient: Jhon Hurst        Sex: female          DOA: 4/4/2018  YOB: 1945      Age:  75 y.o.        LOS:  LOS: 3 days   Assessment/Plan     Active Problems:    DDD (degenerative disc disease), lumbar (3/26/2018)      Spondylolisthesis of lumbar region (3/29/2018)      Scoliosis of thoracolumbar spine (3/29/2018)      SIRS (systemic inflammatory response syndrome) ([facility] (4/5/2018)      History of back surgery (4/5/2018)      Atelectasis (4/6/2018)      New onset a-fib ([address] (4/6/2018)        Plan:  Recurrent Afib without any significant symptoms  Increase metoprolol tartrate from 12.5 mg po BID  To 25 mg po BID  Check dig level tomorrow at 7:00 AM  Add po dig 0.125 mg daily  No long term anticoagulation                  Subjective:    cc:  No CP        REVIEW OF SYSTEMS:     General: No fevers or chills. Cardiovascular: No chest pain or pressure. No palpitations. No ankle swelling  Pulmonary: No SOB, orthopnea, PND  Gastrointestinal: No nausea, vomiting or diarrhea      Objective:      Visit Vitals    BP 95/49 (BP 1 Location: Left arm, BP Patient Position: At rest;Supine; Head of bed elevated (Comment degrees))    Pulse (!) 102    Temp 99 °F (37.2 °C)    Resp 12    Ht 4' 10\" (1.473 m)    Wt 63.3 kg (139 lb 8.8 oz)    SpO2 97%    Breastfeeding No    BMI 29.17 kg/m2     Body mass index is 29.17 kg/(m^2). Physical Exam:  General Appearance: Comfortable, not using accessory muscles of respiration. NECK: No JVD, no thyroidomeglay. LUNGS: Clear bilaterally. HEART: S1 variable +S2 audible,    ABD: Non-tender, BS Audible    EXT: No edema, and no cysnosis. VASCULAR EXAM: Pulses are intact. PSYCHIATRIC EXAM: Mood is appropriate. MUSCULOSKELETAL: Grossly no joint deformity. NEUROLOGICAL: No motor and sensory deficit, Cranial nerves II-XII intact.   Medication:  Current Facility-Administered Medications   Medication Dose Route Frequency    metoprolol tartrate (LOPRESSOR) tablet 25 mg  25 mg Oral Q12H    [START ON 4/8/2018] digoxin (LANOXIN) tablet 0.125 mg  0.125 mg Oral DAILY    aspirin chewable tablet 81 mg  81 mg Oral DAILY    traMADol (ULTRAM) tablet 50 mg  50 mg Oral Q6H PRN    cefTRIAXone (ROCEPHIN) 1 g in sterile water (preservative free) 10 mL IV syringe  1 g IntraVENous Q24H    azithromycin (ZITHROMAX) 500 mg in 0.9% sodium chloride 250 mL IVPB  500 mg IntraVENous Q24H    HYDROcodone-acetaminophen (NORCO) 5-325 mg per tablet 1 Tab  1 Tab Oral Q4H PRN    PARoxetine (PAXIL) tablet 20 mg  20 mg Oral DAILY    polyethylene glycol (MIRALAX) packet 17 g  17 g Oral DAILY    gabapentin (NEURONTIN) capsule 400 mg  400 mg Oral QHS    omeprazole (PRILOSEC) capsule 20 mg  20 mg Oral QHS    lubiPROStone (AMITIZA) capsule 24 mcg  24 mcg Oral BID WITH MEALS    naloxone (NARCAN) injection 0.1 mg  0.1 mg IntraVENous PRN    sodium chloride (NS) flush 5-10 mL  5-10 mL IntraVENous Q8H    sodium chloride (NS) flush 5-10 mL  5-10 mL IntraVENous PRN    acetaminophen (TYLENOL) tablet 650 mg  650 mg Oral Q4H PRN    ondansetron (ZOFRAN ODT) tablet 4 mg  4 mg Oral Q6H PRN    phenol throat spray (CHLORASEPTIC) 1 Spray  1 Spray Oral PRN    benzocaine-menthol (CEPACOL) lozenge 1 Lozenge  1 Lozenge Oral PRN    diazePAM (VALIUM) tablet 2.5 mg  2.5 mg Oral TID PRN    docusate sodium (COLACE) capsule 100 mg  100 mg Oral BID    diphenhydrAMINE (BENADRYL) injection 12.5 mg  12.5 mg IntraVENous Q4H PRN               Lab/Data Reviewed:  Procedures/imaging: see electronic medical records for all procedures/Xrays   and details which were not copied into this note but were reviewed prior to creation of Plan       All lab results for the last 24 hours reviewed.      Recent Labs      04/07/18   0527  04/06/18   0458  04/05/18   1750   WBC  11.0  13.5*  12.2   HGB  8.0*  8.0*  8.6*   HCT  24.7*  24.8*  26.5*   PLT  211  195  227     Recent Labs 04/07/18   0527  04/06/18   0458  04/05/18   1750   NA  140  140  140   K  3.4*  3.4*  3.4*   CL  104  104  102   CO2  28  27  28   GLU  114*  123*  134*   BUN  10  7  10   CREA  0.49*  0.61  0.71   CA  8.1*  7.7*  8.1*       Signed By: Karl Buckner MD     April 7, 2018

## 2018-04-07 NOTE — CONSULTS
[address]  MR#: [id number]  : 1945  ACCOUNT #: [de-identified]   DATE OF SERVICE: 2018    REASON FOR CONSULTATION:  Atrial fibrillation with RVR. HISTORY OF PRESENT ILLNESS:  I am seeing this patient on request of Dr. Lois Jay for AFib with RVR. The patient is a 42-year-old very pleasant patient with a history of cerebellar ataxia, history of recurrent fall, underwent spinal surgery and postoperatively she has anemia and AFib with RVR. She has a tendency of low blood pressure and history of rectal prolapse also. Denied any active history of bleeding or blood transfusion. No history of a stroke or TIA. No history of congestive heart failure. She is not diabetic. Her CHADS vascular score is 2. At this point, the patient is converted into normal sinus rhythm. The patient denied any palpitation, dizziness, presyncope, and syncope. PAST MEDICAL HISTORY:  1. Arthritis. 2.  Cerebellar ataxia. 3.  Recurrent fall. 4.  Depression. PAST SURGICAL HISTORY:  Significant for eye surgery, leg surgery, rectal prolapse surgery, spinal fusion surgery. ALLERGIES:  THE PATIENT HAS NO KNOWN DRUG ALLERGIES. MEDICATIONS:  Reviewed and discussed with the patient. The patient is taking aspirin every day. SOCIAL HISTORY:  Nonsmoker. Drinks a glass of wine per week. FAMILY HISTORY:  Noncontributory. REVIEW OF SYSTEMS:  Ten-point review of system completed and positive pertinent findings discussed in the history of present illness. Rest of the systems are negative. PHYSICAL EXAMINATION:    GENERAL:  At the time of consultation, the patient is comfortable, not in any distress, not using any accessory muscles of respiration. VITAL SIGNS:  Temperature is 98.5, heart rate is 82 per minute, respiration rate is 17 per minute, blood pressure is 115/77, pulse oximetry is 95%. HEENT:  Atraumatic, normocephalic.   NECK:  Supple, no JVD, no carotid bruit. HEART:  S1, S2 audible. LUNGS:  Bilateral air entry positive. No added sounds. ABDOMEN:  Soft, nontender. Bowel sounds audible. EXTREMITIES:  No edema. Pulses are palpable. NEUROLOGIC:  Alert, oriented in time, place and person. Has difficulty speaking also, which is chronic. LABORATORY DATA:  The EKG had shown atrial fibrillation with RVR with nonspecific ST and T changes. Echocardiogram had shown normal ejection fraction without any wall motion abnormality. She is converted into normal sinus rhythm. Hemoglobin is significantly changed from 12.3 to 8.0. Sodium 140, potassium 3.4. Troponin was normal.    ASSESSMENT AND PLAN:  1. Atrial fibrillation with rapid ventricular response, now converted into sinus rhythm. 2.  Status post spinal surgery. 3.  Cerebellar ataxia. 4.  History of recurrent fall. 5.  Normal left ventricular ejection fraction. RECOMMENDATIONS:  The patient is converted into normal sinus rhythm. She normally has low normal blood pressure. We will try a small dose of metoprolol 12.5 mg twice daily along with possible digoxin. Discussed with the patient's  and daughter. The [id number] score is 2, but she has a recurrent fall. The patient and the family agreed to continue only aspirin rather than full therapeutic anticoagulation. The patient may need possible stress test in the future as an outpatient as well and I will follow the patient. Thank you for allowing me to participate in the management of this patient.       Marivel Bermudez MD MA / YESI  D: 04/06/2018 20:48     T: 04/06/2018 21:22  JOB #: [id number]

## 2018-04-07 NOTE — PROGRESS NOTES
Pt continues to record HR between 120-140 at rest. MD Cardiologist notified. See new orders. Pt observed grimacing. Rates  pain @ 5/10 Head and lower back PRN Norco given as ordered. Male family member at the bedside.

## 2018-04-07 NOTE — PROGRESS NOTES
Problem: Falls - Risk of  Goal: *Absence of Falls  Document Amara Fall Risk and appropriate interventions in the flowsheet.    Outcome: Progressing Towards Goal  Fall Risk Interventions:  Mobility Interventions: Patient to call before getting OOB    Mentation Interventions: Adequate sleep, hydration, pain control    Medication Interventions: Patient to call before getting OOB    Elimination Interventions: Call light in reach, Patient to call for help with toileting needs

## 2018-04-07 NOTE — PROGRESS NOTES
Hospitalist Progress Note    Patient: Gracie Valdovinos MRN: [id number]  CSN: [id number]    YOB: 1945  Age: 75 y.o. Sex: female    DOA: 4/4/2018 LOS:  LOS: 3 days          Chief Complaint:    afib with RVR      Assessment/Plan     New onset afib with RVR after back surgery    She converted to SR twice on last 24 hrs but back to Afib this am  Reviewed cardiology recs, and thus ordered another dose IV dig as BP borderline-and already receiving low dose metoprolol  Echo reviewed  Daily asa as fall risk too high to be on full dose AC    Recovering from back surgery ok, but hold PT this am with rapid afib    Ac blood loss anemia    Hypokalemia-PO Kdur    Fever resolved-cx are negative, CTA was neg for PE, empiric IV abx for another day and if no fever again, then will discontinue (may certainly have been atelectasis)    Keep on tele for now, appreciate cardiology help  Disposition :  Patient Active Problem List   Diagnosis Code    DDD (degenerative disc disease), cervical M50.30    OA (osteoarthritis) of hip M16.9    Gita's cerebellar ataxia ([address] G11.2    Acute blood loss anemia D62    DDD (degenerative disc disease), lumbar M51.36    Spondylolisthesis of lumbar region M43.16    Scoliosis of thoracolumbar spine M41.9    SIRS (systemic inflammatory response syndrome) ([address] R65.10    History of back surgery Z98.890    Atelectasis J98.11    New onset a-fib ([address] I48.91       Subjective:    Feels better  No fevers or chills  Denies palpitations, or chest pain, and has had no SOB    Back pain is controlled  She is eating          Vital signs/Intake and Output:  Visit Vitals    /53 (BP 1 Location: Left arm, BP Patient Position: At rest;Supine; Head of bed elevated (Comment degrees))    Pulse (!) 130    Temp 99.1 °F (37.3 °C)    Resp 14    Ht 4' 10\" (1.473 m)    Wt 63.3 kg (139 lb 8.8 oz)    SpO2 100%    Breastfeeding No    BMI 29.17 kg/m2     Current Shift:     Last three shifts: 04/05 1901 - 04/07 0700  In: 240 [P.O.:240]  Out: 1900 [Urine:1900]    Exam:    General: frail thin elderly WF, alert, NAD, OX3  Head/Neck: NCAT, supple, No masses, No lymphadenopathy  CVS:irreg rhythm , tachy, mild, no MRG  Lungs:Clear to auscultation bilaterally, no wheezes, rhonchi, or rales  Abdomen: Soft, Nontender, No distention, Normal Bowel sounds, No hepatomegaly  Extremities: No C/C/E, pulses palpable 2+  Neuro:grossly normal , follows commands  Psych:appropriate                Labs: Results:       Chemistry Recent Labs      04/07/18 0527 04/06/18 0458 04/05/18   1750   GLU  114*  123*  134*   NA  140  140  140   K  3.4*  3.4*  3.4*   CL  104  104  102   CO2  28  27  28   BUN  10  7  10   CREA  0.49*  0.61  0.71   CA  8.1*  7.7*  8.1*   AGAP  8  9  10   BUCR  20  11*  14      CBC w/Diff Recent Labs      04/07/18 0527 04/06/18 0458 04/05/18   1750   WBC  11.0  13.5*  12.2   RBC  2.53*  2.55*  2.74*   HGB  8.0*  8.0*  8.6*   HCT  24.7*  24.8*  26.5*   PLT  211  195  227   GRANS   --    --   82*   LYMPH   --    --   8*   EOS   --    --   0      Cardiac Enzymes Recent Labs      04/05/18   1750   CPK  278*   CKND1  1.0      Coagulation No results for input(s): PTP, INR, APTT in the last 72 hours. No lab exists for component: INREXT    Lipid Panel No results found for: CHOL, CHOLPOCT, CHOLX, CHLST, CHOLV, 203073, HDL, LDL, LDLC, DLDLP, 434201, VLDLC, VLDL, TGLX, TRIGL, TRIGP, TGLPOCT, CHHD, CHHDX   BNP No results for input(s): BNPP in the last 72 hours. Liver Enzymes No results for input(s): TP, ALB, TBIL, AP, SGOT, GPT in the last 72 hours.     No lab exists for component: DBIL   Thyroid Studies Lab Results   Component Value Date/Time    TSH 0.80 04/05/2018 05:50 PM        Procedures/imaging: see electronic medical records for all procedures/Xrays and details which were not copied into this note but were reviewed prior to creation of Amanda Ashraf MD

## 2018-04-07 NOTE — PROGRESS NOTES
10:00 Tech reported to RN pt converted to afib. Pt was resting in bed no distress noted. . Cardiologist made aware. No new orders Will check pt frequently.

## 2018-04-07 NOTE — PROGRESS NOTES
Problem: Mobility Impaired (Adult and Pediatric)  Goal: *Acute Goals and Plan of Care (Insert Text)  In 1-7 days pt will be able to perform:  STG  1. Bed mobility:  Demonstrate proper log-roll technique for safe initiation of rolling for OOB activities. 2.  Supine to sit to supine S with HR for meals. 3.  Sit to stand to sit S with RW/LSO in prep for ambulation. LT.  Gait:  Ambulate 150ft S with RW/LSO, for home/community mobility. 2.  Activity tolerance: Tolerate up in chair 30 minutes-1 hour for ADL's.  3.  Patient/Family Education:  Patient/family to be independent with HEP for follow-up care and safe discharge. Goals revised 2018 to remove stair goal as patient has ramp and does not do stairs. Outcome: Not Progressing Towards Goal  PT session held due to:  []  Nausea/vomiting  [x]  MD Communication/ suggestion  [x]  Elevated HR (130's)  []  Dialysis treatment in progress. Will f/u later, if better controlled. Thank you.   Annice Alpers, PTA

## 2018-04-07 NOTE — ROUTINE PROCESS
Bedside and Verbal shift change report given to HEBER Agarwal (oncoming nurse) by Nano Terra (offgoing nurse). Report included the following information SBAR, Kardex, Intake/Output, MAR, Recent Results and Cardiac Rhythm NSr. Pt converting in and out of Afib to NSR. Dr Rodriguez Round aware, patient asymptomatic, continue to monitor. Get Dig level in am and start oral dig 4/8/18 is the plan.

## 2018-04-07 NOTE — PROGRESS NOTES
Shift Summary:  Uneventful shift. Patient denied pain and discomfort overnight. Lumbar dressing intact. Patient Vitals for the past 12 hrs:   Temp Pulse Resp BP SpO2   04/07/18 0259 98.9 °F (37.2 °C) 85 18 115/61 97 %   04/06/18 2342 99.6 °F (37.6 °C) 83 18 90/42 94 %   04/06/18 1925 - - - - 98 %   04/06/18 1838 98.5 °F (36.9 °C) 82 17 115/77 95 %     Bedside and Verbal shift change report given to DAYA Solano RN (oncoming nurse) by Ketty Garvey (offgoing nurse). Report included the following information SBAR, Kardex, Intake/Output and MAR.

## 2018-04-07 NOTE — PROGRESS NOTES
Problem: Mobility Impaired (Adult and Pediatric)  Goal: *Acute Goals and Plan of Care (Insert Text)  In 1-7 days pt will be able to perform:  STG  1. Bed mobility:  Demonstrate proper log-roll technique for safe initiation of rolling for OOB activities. 2.  Supine to sit to supine S with HR for meals. 3.  Sit to stand to sit S with RW/LSO in prep for ambulation. LT.  Gait:  Ambulate 150ft S with RW/LSO, for home/community mobility. 2.  Activity tolerance: Tolerate up in chair 30 minutes-1 hour for ADL's.  3.  Patient/Family Education:  Patient/family to be independent with HEP for follow-up care and safe discharge. Goals revised 2018 to remove stair goal as patient has ramp and does not do stairs. Outcome: Progressing Towards Goal  physical Therapy TREATMENT    Patient: Deb Du (83 y.o. female)  Date: 2018  Diagnosis: FACET ARTHROSIS,LUMBAR HERNIATED NUCLEUS PULPOSIS W/RADICULOPATHY,LUMBOSACRAL HERNIATED NUCLEUS PULPOSIS W/RADICULOPATHY,LUMBAR SIC DEGENERATION,LUMBOSACRAL [facility] W/OUT MYELOPATHY OR [address] [place] [address]  Spinal stenosis, lumbar Spinal stenosis, lumbar  Procedure(s) (LRB):  L3-S1 DECOMPRESSON AND FUSION W/C-ARM **SPEC POP** (N/A) 3 Days Post-Op  Precautions: WBAT, Spinal, Fall, Back   Chart, physical therapy assessment, plan of care and goals were reviewed. ASSESSMENT:  Update from Cardiology: Pt is safe to be seen with resting HR of 120 BPM or less. Discontinue activity with HR greater than 150 BPM.  Pt is able to increase singular ambulation distance. Ataxia is still a limiting factor and RW spacing requires frequent physical to reduce. Post ambulation, Pt was assisted to sittin in armed chair for dinner consumption. Pt sat for 37 min, before assisted back to bed.  On standing transfer pt's LE go into ataxia, which she requred max assist to complete standing. No safe for OOB th NSG at this time. Will need placement at D/c. RN informed of outcome. Progression toward goals:  []      Improving appropriately and progressing toward goals  [x]      Improving slowly and progressing toward goals  []      Not making progress toward goals and plan of care will be adjusted     PLAN:  Patient continues to benefit from skilled intervention to address the above impairments. Continue treatment per established plan of care. Discharge Recommendations:  Alrfedo Dyer  Further Equipment Recommendations for Discharge:  brace/splint, bedside commode and rolling walker     SUBJECTIVE:   Patient stated I feel ok. My right leg is still numb.     OBJECTIVE DATA SUMMARY:   Critical Behavior:  Neurologic State: Alert  Orientation Level: Oriented X4  Cognition: Appropriate decision making, Appropriate for age attention/concentration, Appropriate safety awareness  Safety/Judgement: Decreased awareness of need for safety  Functional Mobility Training:  Bed Mobility:  Rolling: Supervision;Stand-by assistance  Supine to Sit: Minimum assistance; Moderate assistance  Sit to Supine: Moderate assistance  Transfers:  Sit to Stand: Contact guard assistance  Stand to Sit: Contact guard assistance  Balance:  Sitting: Intact  Sitting - Static: Good (unsupported)  Sitting - Dynamic: Fair (occasional)  Standing: Impaired; With support  Standing - Static: Fair;Poor  Standing - Dynamic : Poor  Ambulation/Gait Training:  Distance (ft): 70 Feet (ft)  Assistive Device: Walker, rolling;Gait belt;Brace/Splint  Ambulation - Level of Assistance: Minimal assistance; Moderate assistance  Gait Abnormalities: Ataxic;Decreased step clearance; Step to gait  Base of Support: Narrowed  Speed/Sheila: Slow  Step Length: Right shortened;Left shortened  Pain:  Pain in: 4 (LBP)  Pain out: 4  Activity Tolerance:   Fair  Please refer to the flowsheet for vital signs taken during this treatment.   After treatment:   [] Patient left in no apparent distress sitting up in chair  [x] Patient left in no apparent distress in bed  [x] Call bell left within reach  [x] Nursing notified  [] Caregiver present  [] Bed alarm activated      Mohit Adams PTA   Time Calculation: 42 mins

## 2018-04-07 NOTE — PROGRESS NOTES
Progress Note POD #      Patient: Delaney Jacques               Sex: female          DOA: 4/4/2018         YOB: 1945      Surgery: Procedure(s):  L3-S1 DECOMPRESSON AND FUSION W/C-ARM **SPEC POP**           LOS: 3 days               Subjective:     No new complaints    Objective:      Visit Vitals    /54 (BP 1 Location: Left arm, BP Patient Position: Sitting)    Pulse 85    Temp 99.1 °F (37.3 °C)    Resp 14    Ht 4' 10\" (1.473 m)    Wt 63.3 kg (139 lb 8.8 oz)    SpO2 96%    Breastfeeding No    BMI 29.17 kg/m2       Physical Exam:  Neurological: motor strength: 5/5 in lower extremities bilaterally                          sensation: intact to light touch  Patient mobility  Bed Mobility Training  Rolling: Supervision  Supine to Sit: Minimum assistance  Sit to Supine: Minimum assistance  Scooting: Supervision  Transfer Training  Sit to Stand: Contact guard assistance  Stand to Sit: Contact guard assistance  Bed to Chair: Moderate assistance      Gait Training  Assistive Device: Walker, rolling, Gait belt, Brace/Splint  Ambulation - Level of Assistance: Minimal assistance, Moderate assistance  Distance (ft): 70 Feet (ft) (30 + 40)            Intake and Output:  Current Shift:     Last three shifts:  04/05 1901 - 04/07 0700  In: 240 [P.O.:240]  Out: 1900 [Urine:1900]    Lab/Data Reviewed:    Lab/Data Reviewed:  Lab Results   Component Value Date/Time    WBC 11.0 04/07/2018 05:27 AM    HGB 8.0 (L) 04/07/2018 05:27 AM    HCT 24.7 (L) 04/07/2018 05:27 AM    PLATELET 837 29/40/0332 05:27 AM    MCV 97.6 (H) 04/07/2018 05:27 AM     Lab Results   Component Value Date/Time    aPTT 27.5 05/13/2014 11:00 AM     Lab Results   Component Value Date/Time    INR 1.0 05/13/2014 11:00 AM    Prothrombin time 12.9 05/13/2014 11:00 AM            Assessment/Plan     Active Problems:    DDD (degenerative disc disease), lumbar (3/26/2018)      Spondylolisthesis of lumbar region (3/29/2018)      Scoliosis of thoracolumbar spine (3/29/2018)      SIRS (systemic inflammatory response syndrome) ([facility] (4/5/2018)      History of back surgery (4/5/2018)      Atelectasis (4/6/2018)      New onset a-fib ([address] (4/6/2018)        1. Stable orthopaedically  2. When cleared by medicine for Afib  3. D/C Planning per medicine  4. Change dressing daily  5. Medicine and cardiology input is appreciated. Patient was seen and examined by Dr. Patricia Wellington and the plan was discussed with Dr. Patricia Wellington.

## 2018-04-07 NOTE — PROGRESS NOTES
1925: Assumed patient care, she was alert and oriented to person, place, time and situation. Respiratory status was stable on room air. Vital signs were stable. MEWS score was a one. Patient denied any nausea vomiting dizziness or anxiety. White board was updated and explained. Bed was locked and in lowest position. Call bell, water and personal belongings were within reach. Patient had no questions, comments or concerns after bedside shift report. 1927: Patient's surgical site dressing was clean, dry and intact with no signs of active bleeding noted. 2330: Patient had an uneventful shift. Respiratory status, vital signs and MEWS score remained stable. Patient was resting quietly with no signs of distress noted. Bed locked and in lowest position. Call bell water and personal belongings were within reach. Patient had no questions, comments or concerns after bedside shift report.  Bedside report given to [facility] PADMINI

## 2018-04-07 NOTE — PROGRESS NOTES
Problem: Self Care Deficits Care Plan (Adult)  Goal: *Acute Goals and Plan of Care (Insert Text)  Short Term Goals 4/5/18:  Max Pea OTR/L  In 3 days:  1. Pt will perform LB dressing tasks with min A using AE as needed in order to increase independence with self care. 2.  Pt will perform toilet transfers and toilet routine with min A using AD/DME as needed in order to increase independence with self care. 3.  Pt will improve standing tolerance to 3 minutes without LOB at sink in order to perform grooming tasks with min A. Attempted to complete OT session but pt is placed on hold currently secondary to elevated-fluctuating HR reaching up to 130's at this time. Will follow up when applicable.      Thank you,  Jennifer Craig MS, OTR/L

## 2018-04-07 NOTE — PROGRESS NOTES
10:00 Tech reported to RN pt converted to afib. Pt was resting in bed no distress noted. Cardiologist made aware. No new orders Will check pt frequently.

## 2018-04-08 LAB
ATRIAL RATE: 105 BPM
BACTERIA SPEC CULT: NORMAL
CALCULATED R AXIS, ECG10: 19 DEGREES
CALCULATED T AXIS, ECG11: -39 DEGREES
DIAGNOSIS, 93000: NORMAL
DIGOXIN SERPL-MCNC: 0.5 NG/ML (ref 0.9–2)
GLUCOSE BLD STRIP.AUTO-MCNC: 101 MG/DL (ref 70–110)
Q-T INTERVAL, ECG07: 316 MS
QRS DURATION, ECG06: 80 MS
QTC CALCULATION (BEZET), ECG08: 452 MS
SERVICE CMNT-IMP: NORMAL
VENTRICULAR RATE, ECG03: 123 BPM

## 2018-04-08 PROCEDURE — 74011250637 HC RX REV CODE- 250/637: Performed by: PHYSICIAN ASSISTANT

## 2018-04-08 PROCEDURE — 36415 COLL VENOUS BLD VENIPUNCTURE: CPT | Performed by: INTERNAL MEDICINE

## 2018-04-08 PROCEDURE — 74011250637 HC RX REV CODE- 250/637: Performed by: INTERNAL MEDICINE

## 2018-04-08 PROCEDURE — 77030027138 HC INCENT SPIROMETER -A

## 2018-04-08 PROCEDURE — 97116 GAIT TRAINING THERAPY: CPT

## 2018-04-08 PROCEDURE — 65660000000 HC RM CCU STEPDOWN

## 2018-04-08 PROCEDURE — 77010033678 HC OXYGEN DAILY

## 2018-04-08 PROCEDURE — 82962 GLUCOSE BLOOD TEST: CPT

## 2018-04-08 PROCEDURE — 74011000250 HC RX REV CODE- 250: Performed by: HOSPITALIST

## 2018-04-08 PROCEDURE — 97530 THERAPEUTIC ACTIVITIES: CPT

## 2018-04-08 PROCEDURE — 80162 ASSAY OF DIGOXIN TOTAL: CPT | Performed by: INTERNAL MEDICINE

## 2018-04-08 PROCEDURE — 74011250636 HC RX REV CODE- 250/636: Performed by: HOSPITALIST

## 2018-04-08 PROCEDURE — 74011250637 HC RX REV CODE- 250/637: Performed by: HOSPITALIST

## 2018-04-08 RX ADMIN — POLYETHYLENE GLYCOL 3350 17 G: 17 POWDER, FOR SOLUTION ORAL at 08:37

## 2018-04-08 RX ADMIN — PAROXETINE HYDROCHLORIDE 20 MG: 20 TABLET, FILM COATED ORAL at 08:36

## 2018-04-08 RX ADMIN — METOPROLOL TARTRATE 25 MG: 25 TABLET ORAL at 08:36

## 2018-04-08 RX ADMIN — Medication 10 ML: at 13:07

## 2018-04-08 RX ADMIN — LUBIPROSTONE 24 MCG: 24 CAPSULE, GELATIN COATED ORAL at 16:35

## 2018-04-08 RX ADMIN — Medication 10 ML: at 21:57

## 2018-04-08 RX ADMIN — LUBIPROSTONE 24 MCG: 24 CAPSULE, GELATIN COATED ORAL at 08:36

## 2018-04-08 RX ADMIN — TRAMADOL HYDROCHLORIDE 50 MG: 50 TABLET, FILM COATED ORAL at 23:28

## 2018-04-08 RX ADMIN — GABAPENTIN 400 MG: 400 CAPSULE ORAL at 21:56

## 2018-04-08 RX ADMIN — DIGOXIN 0.12 MG: 125 TABLET ORAL at 08:37

## 2018-04-08 RX ADMIN — ASPIRIN 81 MG 81 MG: 81 TABLET ORAL at 08:35

## 2018-04-08 RX ADMIN — WATER 1 G: 1 INJECTION INTRAMUSCULAR; INTRAVENOUS; SUBCUTANEOUS at 21:56

## 2018-04-08 RX ADMIN — Medication 10 ML: at 05:31

## 2018-04-08 RX ADMIN — DOCUSATE SODIUM 100 MG: 100 CAPSULE, LIQUID FILLED ORAL at 21:56

## 2018-04-08 RX ADMIN — DOCUSATE SODIUM 100 MG: 100 CAPSULE, LIQUID FILLED ORAL at 08:35

## 2018-04-08 RX ADMIN — OMEPRAZOLE 20 MG: 20 CAPSULE, DELAYED RELEASE ORAL at 21:56

## 2018-04-08 RX ADMIN — METOPROLOL TARTRATE 25 MG: 25 TABLET ORAL at 21:56

## 2018-04-08 NOTE — PROGRESS NOTES
20:30 Assessment completed. O2 remains @ 2 LPM per NC, lungs are clear bilat. Mepilex dsg on back remains C/D/I with + CMS & + PP. Denies numbness or tingling bilat. Dorsi/plantar flexion & extension remains = but are slightly weak. Resting quietly in bed with family @ bedside. 22:45 Shift assessment completed. See nsg flow sheet for details. 03:05 Reassessed with 0 changes noted. Resting quietly in bed with eyes closed between cares. 07:25 Bedside and Verbal shift change report given to Iza RN/Clarence SHANNON (oncoming nurse) by Ton Cardona RN (offgoing nurse). Report included the following information SBAR.

## 2018-04-08 NOTE — PROGRESS NOTES
Hospitalist Progress Note    Patient: Leroy Richardson MRN: [id number]  CSN: [id number]    YOB: 1945  Age: 75 y.o. Sex: female    DOA: 4/4/2018 LOS:  LOS: 4 days          Chief Complaint:    afib with RVR      Assessment/Plan     afib with RVR  S/p back surgery  Fever/SIRS due to atelectasis  Anemia    Doing better  afib now on dig and metoprolol, primarily rate is stable in 90's  No AC due to fall risk, asa only    Deescalate abx, cultures are neg for now, no further fevers    If doing ok, can d/c to SNF Monday  Disposition :  Patient Active Problem List   Diagnosis Code    DDD (degenerative disc disease), cervical M50.30    OA (osteoarthritis) of hip M16.9    Gita's cerebellar ataxia ([address] G11.2    Acute blood loss anemia D62    DDD (degenerative disc disease), lumbar M51.36    Spondylolisthesis of lumbar region M43.16    Scoliosis of thoracolumbar spine M41.9    SIRS (systemic inflammatory response syndrome) ([address] R65.10    History of back surgery Z98.890    Atelectasis J98.11    New onset a-fib ([address] I48.91       Subjective:    Feeling better  Denies back pain, CP, SOB    Review of systems:    Constitutional: denies fevers, chills, myalgias  Cardiovascular: denies chest pain, palpitations  Gastrointestinal: denies nausea, vomiting, diarrhea      Vital signs/Intake and Output:  Visit Vitals    /50 (BP 1 Location: Right arm, BP Patient Position: At rest;Supine; Head of bed elevated (Comment degrees))    Pulse 74    Temp 98.7 °F (37.1 °C)    Resp 14    Ht 4' 10\" (1.473 m)    Wt 63.3 kg (139 lb 8.8 oz)    SpO2 98%    Breastfeeding No    BMI 29.17 kg/m2     Current Shift:  04/08 0701 - 04/08 1900  In: 240 [P.O.:240]  Out: 650 [Urine:650]  Last three shifts:  04/06 1901 - 04/08 0700  In: 1120 [P.O.:1120]  Out: 1600 [Urine:1600]    Exam:    General: frail thin WF, alert, NAD, OX3  CVS:irreg rhythm, reg rate, no M/R/G, S1/S2 heard, no thrill  Lungs:Clear to auscultation bilaterally, no wheezes, rhonchi, or rales  Abdomen: Soft, Nontender, No distention, Normal Bowel sounds, No hepatomegaly  Extremities: No C/C/E, pulses palpable 2+  Neuro:grossly normal , follows commands  Psych:appropriate                Labs: Results:       Chemistry Recent Labs      04/07/18 0527 04/06/18 0458 04/05/18   1750   GLU  114*  123*  134*   NA  140  140  140   K  3.4*  3.4*  3.4*   CL  104  104  102   CO2  28  27  28   BUN  10  7  10   CREA  0.49*  0.61  0.71   CA  8.1*  7.7*  8.1*   AGAP  8  9  10   BUCR  20  11*  14      CBC w/Diff Recent Labs      04/07/18 0527 04/06/18 0458 04/05/18   1750   WBC  11.0  13.5*  12.2   RBC  2.53*  2.55*  2.74*   HGB  8.0*  8.0*  8.6*   HCT  24.7*  24.8*  26.5*   PLT  211  195  227   GRANS   --    --   82*   LYMPH   --    --   8*   EOS   --    --   0      Cardiac Enzymes Recent Labs      04/05/18   1750   CPK  278*   CKND1  1.0      Coagulation No results for input(s): PTP, INR, APTT in the last 72 hours. No lab exists for component: INREXT    Lipid Panel No results found for: CHOL, CHOLPOCT, CHOLX, CHLST, CHOLV, 488215, HDL, LDL, LDLC, DLDLP, 644169, VLDLC, VLDL, TGLX, TRIGL, TRIGP, TGLPOCT, CHHD, CHHDX   BNP No results for input(s): BNPP in the last 72 hours. Liver Enzymes No results for input(s): TP, ALB, TBIL, AP, SGOT, GPT in the last 72 hours.     No lab exists for component: DBIL   Thyroid Studies Lab Results   Component Value Date/Time    TSH 0.80 04/05/2018 05:50 PM        Procedures/imaging: see electronic medical records for all procedures/Xrays and details which were not copied into this note but were reviewed prior to creation of Bony Collins MD

## 2018-04-08 NOTE — PROGRESS NOTES
Problem: Mobility Impaired (Adult and Pediatric)  Goal: *Acute Goals and Plan of Care (Insert Text)  In 1-7 days pt will be able to perform:  STG  1. Bed mobility:  Demonstrate proper log-roll technique for safe initiation of rolling for OOB activities. 2.  Supine to sit to supine S with HR for meals. 3.  Sit to stand to sit S with RW/LSO in prep for ambulation. LT.  Gait:  Ambulate 150ft S with RW/LSO, for home/community mobility. 2.  Activity tolerance: Tolerate up in chair 30 minutes-1 hour for ADL's.  3.  Patient/Family Education:  Patient/family to be independent with HEP for follow-up care and safe discharge. Goals revised 2018 to remove stair goal as patient has ramp and does not do stairs. Outcome: Progressing Towards Goal  physical Therapy TREATMENT    Patient: Zonia Blue (36 y.o. female)  Date: 2018  Diagnosis: FACET ARTHROSIS,LUMBAR HERNIATED NUCLEUS PULPOSIS W/RADICULOPATHY,LUMBOSACRAL HERNIATED NUCLEUS PULPOSIS W/RADICULOPATHY,LUMBAR SIC DEGENERATION,LUMBOSACRAL [address] W/OUT MYELOPATHY OR [address] [place] [address]  Spinal stenosis, lumbar Spinal stenosis, lumbar  Procedure(s) (LRB):  L3-S1 DECOMPRESSON AND FUSION W/C-ARM **SPEC POP** (N/A) 4 Days Post-Op  Precautions: WBAT, Spinal, Fall, Back   Chart, physical therapy assessment, plan of care and goals were reviewed. ASSESSMENT:  Dynamic standing balance remains unsteady with RW, Mod A required for ambulation. Extensive VCs for correct RW management and safety. Manual assistance needed. UE/LE Ataxia noted. Pt able to take steps to chair. Nursing notified, will return to get back to bed. Cont POC.     Progression toward goals:  []      Improving appropriately and progressing toward goals  [x]      Improving slowly and progressing toward goals  []      Not making progress toward goals and plan of care will be adjusted     PLAN:  Patient continues to benefit from skilled intervention to address the above impairments. Continue treatment per established plan of care. Discharge Recommendations:  Rehab  Further Equipment Recommendations for Discharge:  rolling walker     SUBJECTIVE:   Patient stated  I would like to sit up     OBJECTIVE DATA SUMMARY:   Critical Behavior:  Neurologic State: Eyes open to stimulus, Alert  Orientation Level: Oriented to person, Oriented to place, Oriented to situation  Cognition: Appropriate for age attention/concentration  Safety/Judgement: Decreased awareness of need for safety  Functional Mobility Training:  Bed Mobility:  Rolling: Contact guard assistance  Supine to Sit: Minimum assistance; Moderate assistance  Transfers:  Sit to Stand: Minimum assistance  Stand to Sit: Minimum assistance  Balance:  Sitting: Intact  Sitting - Static: Good (unsupported)  Sitting - Dynamic: Fair (occasional)  Standing: Impaired; With support  Standing - Static: Fair;Poor  Standing - Dynamic : Poor  Ambulation/Gait Training:  Distance (ft): 5 Feet (ft) (side steps to chair )  Assistive Device: Walker, rolling;Gait belt;Brace/Splint  Ambulation - Level of Assistance:  Moderate assistance  Gait Abnormalities: Ataxic;Decreased step clearance;Shuffling gait  Base of Support: Narrowed  Speed/Sheila: Slow  Step Length: Left shortened;Right shortened      Pain:  Pain Scale 1: Numeric (0 - 10)  Pain Intensity 1: 3  Pain Location 1: Back  Pain Orientation 1: Lower  Pain Description 1: Aching  Activity Tolerance:   Fair     After treatment:   [x] Patient left in no apparent distress sitting up in chair  [] Patient left in no apparent distress in bed  [x] Call bell left within reach  [x] Nursing notified  [] Caregiver present  [] Bed alarm activated      Evie Sanchez PTA   Time Calculation: 23 mins

## 2018-04-08 NOTE — PROGRESS NOTES
Problem: Mobility Impaired (Adult and Pediatric)  Goal: *Acute Goals and Plan of Care (Insert Text)  In 1-7 days pt will be able to perform:  STG  1. Bed mobility:  Demonstrate proper log-roll technique for safe initiation of rolling for OOB activities. 2.  Supine to sit to supine S with HR for meals. 3.  Sit to stand to sit S with RW/LSO in prep for ambulation. LT.  Gait:  Ambulate 150ft S with RW/LSO, for home/community mobility. 2.  Activity tolerance: Tolerate up in chair 30 minutes-1 hour for ADL's.  3.  Patient/Family Education:  Patient/family to be independent with HEP for follow-up care and safe discharge. Goals revised 2018 to remove stair goal as patient has ramp and does not do stairs. Outcome: Progressing Towards Goal  physical Therapy TREATMENT    Patient: Deb Du (8 y.o. female)  Date: 2018  Diagnosis: FACET ARTHROSIS,LUMBAR HERNIATED NUCLEUS PULPOSIS W/RADICULOPATHY,LUMBOSACRAL HERNIATED NUCLEUS PULPOSIS W/RADICULOPATHY,LUMBAR SIC DEGENERATION,LUMBOSACRAL [address] [facility] W/OUT MYELOPATHY OR [address] [place] [address]  Spinal stenosis, lumbar Spinal stenosis, lumbar  Procedure(s) (LRB):  L3-S1 DECOMPRESSON AND FUSION W/C-ARM **SPEC POP** (N/A) 4 Days Post-Op  Precautions: WBAT, Spinal, Fall, Back   Chart, physical therapy assessment, plan of care and goals were reviewed. ASSESSMENT:  Pt able to tolerate sitting in chair for 1 hour. Pt continues to require significant physical assistance. Would not recommend nursing to get pt to chair at this time. Cont POC.    Progression toward goals:  []      Improving appropriately and progressing toward goals  [x]      Improving slowly and progressing toward goals  []      Not making progress toward goals and plan of care will be adjusted     PLAN:  Patient continues to benefit from skilled intervention to address the above impairments. Continue treatment per established plan of care. Discharge Recommendations:  Rehab  Further Equipment Recommendations for Discharge:  rolling walker     SUBJECTIVE:   Patient stated  I'm ready to get back to bed     OBJECTIVE DATA SUMMARY:   Critical Behavior:  Neurologic State: Eyes open to stimulus, Alert  Orientation Level: Oriented to person, Oriented to place, Oriented to situation  Cognition: Appropriate for age attention/concentration  Safety/Judgement: Decreased awareness of need for safety  Functional Mobility Training:  Bed Mobility:  Rolling: Contact guard assistance  Supine to Sit: Minimum assistance; Moderate assistance  Sit to Supine: Minimum assistance  Transfers:  Sit to Stand: Minimum assistance  Stand to Sit: Minimum assistance  Balance:  Sitting: Intact  Sitting - Static: Good (unsupported)  Sitting - Dynamic: Fair (occasional)  Standing: Impaired; With support  Standing - Static: Fair;Poor  Standing - Dynamic : Poor  Ambulation/Gait Training:  Distance (ft): 5 Feet (ft) (side steps to chair )  Assistive Device: Walker, rolling;Gait belt;Brace/Splint  Ambulation - Level of Assistance:  Moderate assistance  Gait Abnormalities: Ataxic;Decreased step clearance;Shuffling gait  Base of Support: Narrowed  Speed/Sheila: Slow  Step Length: Left shortened;Right shortened    Pain:  Pain Scale 1: Numeric (0 - 10)  Pain Intensity 1: 3  Pain Location 1: Back  Pain Orientation 1: Lower  Pain Description 1: Aching  Activity Tolerance:   Fair    After treatment:   [] Patient left in no apparent distress sitting up in chair  [x] Patient left in no apparent distress in bed  [x] Call bell left within reach  [] Nursing notified  [] Caregiver present  [] Bed alarm activated      Adriel Barroso PTA   Time Calculation: 20 mins

## 2018-04-08 NOTE — ROUTINE PROCESS
0730 - Bedside report received from Kyle Martinez RN. Patient in bed at rest at this time. Pain 3/10. Plan of care for the day addressed with the patient.

## 2018-04-08 NOTE — PROGRESS NOTES
3110  - Patient in bed at this time. A/O x 3. IV to Right hand  intact and patent. Teds and SCD's to legs. Mepilex dressing to center of back CDI. Patient says she has some numbness of her right foot  Pedal pulses palpable. Lungs clear. Bowel sounds present to all quadrants. Patient able to get to 1000 on the incentive spirometer. Pain 3/10. Patient encouraged to order breakfast.    941- Patient resting in bed. Call light left within reach. 1030- Patient finished lunch, lying in bed, call light in reach.

## 2018-04-08 NOTE — PROGRESS NOTES
Cardiology Progress Note        Patient: Myra Silva        Sex: female          DOA: 4/4/2018  YOB: 1945      Age:  75 y.o.        LOS:  LOS: 4 days   Assessment/Plan     Active Problems:    DDD (degenerative disc disease), lumbar (3/26/2018)      Spondylolisthesis of lumbar region (3/29/2018)      Scoliosis of thoracolumbar spine (3/29/2018)      SIRS (systemic inflammatory response syndrome) ([address] (4/5/2018)      History of back surgery (4/5/2018)      Atelectasis (4/6/2018)      New onset a-fib ([address] (4/6/2018)        Plan:  Now SR  Intermittent Afib, rate well controlled, mostly asymptomatic  BP low normal  Pt may be discharged from cardiac stand point tomorrow if stable  On aspirin 81 mg, metoprolol tartrate 25 mg po BID, digoxin 0.125 po daily  F/U in cardiology in one month  Discussed with patient and   I will sign off and please call if any questions/concerned                    Subjective:    cc:  Feeling better        REVIEW OF SYSTEMS:     General: No fevers or chills. Cardiovascular: No chest pain or pressure. No palpitations. No ankle swelling  Pulmonary: No SOB, orthopnea, PND  Gastrointestinal: No nausea, vomiting or diarrhea      Objective:      Visit Vitals    /68 (BP 1 Location: Right arm, BP Patient Position: Sitting)    Pulse 69    Temp 98.6 °F (37 °C)    Resp 14    Ht 4' 10\" (1.473 m)    Wt 63.3 kg (139 lb 8.8 oz)    SpO2 100%    Breastfeeding No    BMI 29.17 kg/m2     Body mass index is 29.17 kg/(m^2). Physical Exam:  General Appearance: Comfortable, not using accessory muscles of respiration. NECK: No JVD, no thyroidomeglay. LUNGS: Clear bilaterally. HEART: S1+S2 audible,    ABD: Non-tender, BS Audible    EXT: No edema, and no cysnosis. VASCULAR EXAM: Pulses are intact. PSYCHIATRIC EXAM: Mood is appropriate. MUSCULOSKELETAL: Grossly no joint deformity.   NEUROLOGICAL: No motor and sensory deficit, Cranial nerves II-XII intact. Medication:  Current Facility-Administered Medications   Medication Dose Route Frequency    metoprolol tartrate (LOPRESSOR) tablet 25 mg  25 mg Oral Q12H    digoxin (LANOXIN) tablet 0.125 mg  0.125 mg Oral DAILY    aspirin chewable tablet 81 mg  81 mg Oral DAILY    traMADol (ULTRAM) tablet 50 mg  50 mg Oral Q6H PRN    cefTRIAXone (ROCEPHIN) 1 g in sterile water (preservative free) 10 mL IV syringe  1 g IntraVENous Q24H    HYDROcodone-acetaminophen (NORCO) 5-325 mg per tablet 1 Tab  1 Tab Oral Q4H PRN    PARoxetine (PAXIL) tablet 20 mg  20 mg Oral DAILY    polyethylene glycol (MIRALAX) packet 17 g  17 g Oral DAILY    gabapentin (NEURONTIN) capsule 400 mg  400 mg Oral QHS    omeprazole (PRILOSEC) capsule 20 mg  20 mg Oral QHS    lubiPROStone (AMITIZA) capsule 24 mcg  24 mcg Oral BID WITH MEALS    naloxone (NARCAN) injection 0.1 mg  0.1 mg IntraVENous PRN    sodium chloride (NS) flush 5-10 mL  5-10 mL IntraVENous Q8H    sodium chloride (NS) flush 5-10 mL  5-10 mL IntraVENous PRN    acetaminophen (TYLENOL) tablet 650 mg  650 mg Oral Q4H PRN    ondansetron (ZOFRAN ODT) tablet 4 mg  4 mg Oral Q6H PRN    phenol throat spray (CHLORASEPTIC) 1 Spray  1 Spray Oral PRN    benzocaine-menthol (CEPACOL) lozenge 1 Lozenge  1 Lozenge Oral PRN    diazePAM (VALIUM) tablet 2.5 mg  2.5 mg Oral TID PRN    docusate sodium (COLACE) capsule 100 mg  100 mg Oral BID    diphenhydrAMINE (BENADRYL) injection 12.5 mg  12.5 mg IntraVENous Q4H PRN               Lab/Data Reviewed:  Procedures/imaging: see electronic medical records for all procedures/Xrays   and details which were not copied into this note but were reviewed prior to creation of Plan       All lab results for the last 24 hours reviewed.      Recent Labs      04/07/18   0527  04/06/18   0458  04/05/18   1750   WBC  11.0  13.5*  12.2   HGB  8.0*  8.0*  8.6*   HCT  24.7*  24.8*  26.5*   PLT  211  195  227     Recent Labs      04/07/18   0527 04/06/18   0458  04/05/18   1750   NA  140  140  140   K  3.4*  3.4*  3.4*   CL  104  104  102   CO2  28  27  28   GLU  114*  123*  134*   BUN  10  7  10   CREA  0.49*  0.61  0.71   CA  8.1*  7.7*  8.1*       Signed By: Kitty Payne MD     April 8, 2018

## 2018-04-08 NOTE — PROGRESS NOTES
Problem: Falls - Risk of  Goal: *Absence of Falls  Document Amara Fall Risk and appropriate interventions in the flowsheet.    Outcome: Progressing Towards Goal  Fall Risk Interventions:  Mobility Interventions: Patient to call before getting OOB    Mentation Interventions: Adequate sleep, hydration, pain control    Medication Interventions: Patient to call before getting OOB    Elimination Interventions: Call light in reach

## 2018-04-08 NOTE — PROGRESS NOTES
Progress Note POD #      Patient: Aida Way               Sex: female          DOA: 4/4/2018         YOB: 1945      Surgery: Procedure(s):  L3-S1 DECOMPRESSON AND FUSION W/C-ARM **SPEC POP**           LOS: 4 days               Subjective:     No new complaints. Reports her back and legs feel better today. She has been ambulatory. Objective:      Visit Vitals    /63 (BP 1 Location: Right arm, BP Patient Position: At rest;Supine; Head of bed elevated (Comment degrees))    Pulse 85    Temp 98.2 °F (36.8 °C)    Resp 14    Ht 4' 10\" (1.473 m)    Wt 63.3 kg (139 lb 8.8 oz)    SpO2 99%    Breastfeeding No    BMI 29.17 kg/m2       Physical Exam:  Neurological: motor strength: 5/5 in lower extremities bilaterally                          sensation: intact to light touch  Patient mobility  Bed Mobility Training  Rolling: Supervision, Stand-by assistance  Supine to Sit: Minimum assistance, Moderate assistance  Sit to Supine:  Moderate assistance  Scooting: Supervision  Transfer Training  Sit to Stand: Contact guard assistance  Stand to Sit: Contact guard assistance  Bed to Chair: Moderate assistance      Gait Training  Assistive Device: Walker, rolling, Gait belt, Brace/Splint  Ambulation - Level of Assistance: Minimal assistance, Moderate assistance  Distance (ft): 70 Feet (ft)       Patient Response  Patient Response: Fair-    Intake and Output:  Current Shift:  04/08 0701 - 04/08 1900  In: 240 [P.O.:240]  Out: -   Last three shifts:  04/06 1901 - 04/08 0700  In: 1120 [P.O.:1120]  Out: 1600 [Urine:1600]    Lab/Data Reviewed:    Lab/Data Reviewed:  Lab Results   Component Value Date/Time    WBC 11.0 04/07/2018 05:27 AM    HGB 8.0 (L) 04/07/2018 05:27 AM    HCT 24.7 (L) 04/07/2018 05:27 AM    PLATELET 223 74/05/8694 05:27 AM    MCV 97.6 (H) 04/07/2018 05:27 AM     Lab Results   Component Value Date/Time    aPTT 27.5 05/13/2014 11:00 AM     Lab Results   Component Value Date/Time    INR 1.0 05/13/2014 11:00 AM    Prothrombin time 12.9 05/13/2014 11:00 AM            Assessment/Plan     Active Problems:    DDD (degenerative disc disease), lumbar (3/26/2018)      Spondylolisthesis of lumbar region (3/29/2018)      Scoliosis of thoracolumbar spine (3/29/2018)      SIRS (systemic inflammatory response syndrome) ([facility] [address] (4/5/2018)      History of back surgery (4/5/2018)      Atelectasis (4/6/2018)      New onset a-fib ([address] (4/6/2018)        1. Stable orthopaedically  2. When cleared by medicine for Afib  3. D/C Planning per medicine  4. Change dressing daily  5. Medicine and cardiology input is appreciated. The plan was discussed with Dr. Catie Nickerson and he is in agreement with above.

## 2018-04-08 NOTE — PROGRESS NOTES
1920:  Bedside and Verbal shift change report received from Jackelyn Mendoza, RN (offgoing nurse) to Gwyn Avery RN (oncoming nurse). Report included the following information SBAR, Kardex, Intake/Output, MAR, Recent Results and Cardiac Rhythm Afib rate controlled. Pt resting in bed. Appears to be in no distress at this time. Call bell within reach. Zone phone number given to pt. Pt instructed to call zone phone or call bell if needed. Pt instructed to call for assistance before ambulating. Shift summary:  Pt had uneventful shift.

## 2018-04-09 VITALS
TEMPERATURE: 98.7 F | SYSTOLIC BLOOD PRESSURE: 100 MMHG | WEIGHT: 141.2 LBS | OXYGEN SATURATION: 97 % | BODY MASS INDEX: 29.64 KG/M2 | HEART RATE: 64 BPM | HEIGHT: 58 IN | DIASTOLIC BLOOD PRESSURE: 39 MMHG | RESPIRATION RATE: 18 BRPM

## 2018-04-09 LAB
ANION GAP SERPL CALC-SCNC: 6 MMOL/L (ref 3–18)
BUN SERPL-MCNC: 6 MG/DL (ref 7–18)
BUN/CREAT SERPL: 14 (ref 12–20)
CALCIUM SERPL-MCNC: 8.5 MG/DL (ref 8.5–10.1)
CHLORIDE SERPL-SCNC: 108 MMOL/L (ref 100–108)
CO2 SERPL-SCNC: 30 MMOL/L (ref 21–32)
CREAT SERPL-MCNC: 0.44 MG/DL (ref 0.6–1.3)
DIGOXIN SERPL-MCNC: 0.5 NG/ML (ref 0.9–2)
ERYTHROCYTE [DISTWIDTH] IN BLOOD BY AUTOMATED COUNT: 12.8 % (ref 11.6–14.5)
GLUCOSE SERPL-MCNC: 108 MG/DL (ref 74–99)
HCT VFR BLD AUTO: 24.8 % (ref 35–45)
HGB BLD-MCNC: 8 G/DL (ref 12–16)
MCH RBC QN AUTO: 31.4 PG (ref 24–34)
MCHC RBC AUTO-ENTMCNC: 32.3 G/DL (ref 31–37)
MCV RBC AUTO: 97.3 FL (ref 74–97)
PLATELET # BLD AUTO: 269 K/UL (ref 135–420)
PMV BLD AUTO: 9.5 FL (ref 9.2–11.8)
POTASSIUM SERPL-SCNC: 3.5 MMOL/L (ref 3.5–5.5)
RBC # BLD AUTO: 2.55 M/UL (ref 4.2–5.3)
SODIUM SERPL-SCNC: 144 MMOL/L (ref 136–145)
WBC # BLD AUTO: 7 K/UL (ref 4.6–13.2)

## 2018-04-09 PROCEDURE — 80162 ASSAY OF DIGOXIN TOTAL: CPT | Performed by: HOSPITALIST

## 2018-04-09 PROCEDURE — 74011250637 HC RX REV CODE- 250/637: Performed by: INTERNAL MEDICINE

## 2018-04-09 PROCEDURE — 85027 COMPLETE CBC AUTOMATED: CPT | Performed by: HOSPITALIST

## 2018-04-09 PROCEDURE — 80048 BASIC METABOLIC PNL TOTAL CA: CPT | Performed by: HOSPITALIST

## 2018-04-09 PROCEDURE — 74011250637 HC RX REV CODE- 250/637: Performed by: HOSPITALIST

## 2018-04-09 PROCEDURE — 97530 THERAPEUTIC ACTIVITIES: CPT

## 2018-04-09 PROCEDURE — 36415 COLL VENOUS BLD VENIPUNCTURE: CPT | Performed by: HOSPITALIST

## 2018-04-09 PROCEDURE — 77030011256 HC DRSG MEPILEX <16IN NO BORD MOLN -A

## 2018-04-09 PROCEDURE — 74011250637 HC RX REV CODE- 250/637: Performed by: PHYSICIAN ASSISTANT

## 2018-04-09 PROCEDURE — 77010033678 HC OXYGEN DAILY

## 2018-04-09 RX ORDER — METOPROLOL TARTRATE 25 MG/1
25 TABLET, FILM COATED ORAL EVERY 12 HOURS
Qty: 60 TAB | Refills: 0 | Status: SHIPPED
Start: 2018-04-09 | End: 2022-09-02

## 2018-04-09 RX ORDER — DIGOXIN 125 MCG
0.12 TABLET ORAL DAILY
Qty: 30 TAB | Refills: 0 | Status: SHIPPED
Start: 2018-04-09 | End: 2021-12-11

## 2018-04-09 RX ADMIN — LUBIPROSTONE 24 MCG: 24 CAPSULE, GELATIN COATED ORAL at 09:08

## 2018-04-09 RX ADMIN — ASPIRIN 81 MG 81 MG: 81 TABLET ORAL at 09:08

## 2018-04-09 RX ADMIN — HYDROCODONE BITARTRATE AND ACETAMINOPHEN 1 TABLET: 5; 325 TABLET ORAL at 00:01

## 2018-04-09 RX ADMIN — PAROXETINE HYDROCHLORIDE 20 MG: 20 TABLET, FILM COATED ORAL at 09:08

## 2018-04-09 RX ADMIN — DOCUSATE SODIUM 100 MG: 100 CAPSULE, LIQUID FILLED ORAL at 09:08

## 2018-04-09 RX ADMIN — DIGOXIN 0.12 MG: 125 TABLET ORAL at 09:08

## 2018-04-09 RX ADMIN — METOPROLOL TARTRATE 25 MG: 25 TABLET ORAL at 09:08

## 2018-04-09 RX ADMIN — POLYETHYLENE GLYCOL 3350 17 G: 17 POWDER, FOR SOLUTION ORAL at 09:08

## 2018-04-09 RX ADMIN — Medication 10 ML: at 06:00

## 2018-04-09 NOTE — PROGRESS NOTES
Noted discharge order for today. VM left for [place] to confirm bed available and insurance approval for today; awaiting call back. 1014- Plan: [facility] at [address] [place], [place]. Report to [phone]. Please include all hard scripts for controlled substances, med rec and dc summary in packet. Please medicate for pain prior to dc if possible and needed to help offset delay when patient first arrives to facility. Pt will need medical transport for safety, both she and spouse aware pt may incur cost for transport. CM spoke with spouse, who is on unit and he agrees with plan. Noon transport scheduled- [facility] aware. Care Management Interventions  PCP Verified by CM:  Yes  Mode of Transport at Discharge: BLS  Transition of Care Consult (CM Consult): SNF  [address] No  Reason Outside [place]: Physician referred to specific agency ([facility])  Partner SNF: Yes  Discharge Durable Medical Equipment: Yes (pt declined RW, states she has one at home)  Physical Therapy Consult: Yes  Occupational Therapy Consult: Yes  Current Support Network: Lives with Spouse  Confirm Follow Up Transport: Family  Plan discussed with Pt/Family/Caregiver: Yes  Freedom of Choice Offered: Yes  Discharge Location  Discharge Placement: Skilled nursing facility

## 2018-04-09 NOTE — DISCHARGE SUMMARY
[address]  MR#: [id number]  : 1945  ACCOUNT #: [de-identified]   DATE OF SERVICE: 2018    DISCHARGE DIAGNOSES:   1.  Status post back surgery for spondylolisthesis of lumbar region and scoliosis of thoracolumbar spine. 2.  New onset atrial fibrillation with rapid ventricular response. 3.  Systemic inflammation response syndrome, resolved. 4.  Atelectasis and fever, resolved. CONSULTANTS:  Dr. Artie Francisco, Cardiology. ADMITTING PHYSICIAN:  Dr. Manda Gambino, Orthopedic surgery. HOSPITAL SUMMARY; The patient is 75years old. She was admitted to the hospital on . She had an operation on her back with Dr. Manda Gambino on . She had an L3 to S1 decompression and fusion with a C-arm. Postoperatively, she was doing okay, but developed fever and rapid heart rate. I was called to see her on the . In the evening, she had an EKG that showed atrial fibrillation, which was new for her. She had a fever also. We recommended to transfer to the telemetry unit so that we could medicate her atrial fibrillation and evaluate her further for stability. She went on a Cardizem drip that night. She had some episodes of low blood pressure, she required fluid resuscitation. We diagnosed her with SIRS and gave her empiric antibiotics and cultures, worked her up for possible pneumonia and pulmonary embolism. The CT angiogram came back with no evidence for PE. There was basilar consolidation most consistent with atelectasis. Cultures have come back with a negative urinalysis, negative urine culture and blood cultures x2 that are all normal and negative for any growth. Her fever abated pretty quickly. The atrial fibrillation came under better control with transition to oral beta blocker, and then the addition of digoxin as well, as she does have low normal blood pressure. Cardiac markers were within normal limits. Potassium has been repleted.   Her creatinine is stable at 0.44. Her hemoglobin and hematocrit are stable at 8 and 24.8. She does have an acute blood loss anemia. Prior to surgery, on  her hemoglobin was 12.3, it is now 8, but stable. She is doing well otherwise. PHYSICAL EXAMINATION  VITAL SIGNS:  Today, temp 97.4, pulse 66, blood pressure 112/49, respiratory rate is 18, SaO2 is 100% on room air. GENERAL:  She is a delightful elderly female who is thin, in no acute distress, resting comfortably in the bed. LUNGS:  Clear bilaterally. CARDIAC:  Regular rate and rhythm. No murmur, rub or gallop. ABDOMEN:  Soft, nontender. LOWER EXTREMITIES:  Distal pulses palpable. Lower extremity strength is 5/5. She had an echocardiogram done that shows an EF of 55% to 65%. Cardiology signed off on her meds for now. She is going to take aspirin daily, only because she does have a fall risk due to her spinal disease and advanced age, so family and Cardiology discussed and agreed upon aspirin 81 mg daily for thromboembolic risk, but she again has converted to a sinus rhythm at this point, so hopefully, she will remain in that. Cardiology recommend she follow up in 1 month with them, and Orthopedics has signed off at this point in time, they have recommended follow up that will be noted just at the end of this summary. DISCHARGE MEDICATIONS:  Include the followin. Aspirin 81 mg daily. 2.  Calcium with vitamin D 1 tab twice daily. 3.  Centrum Silver 1 tablet daily. 4.  Digoxin 0.125 mg daily. 5.  Fish oil 1000 mg cap daily. 6.  Neurontin 400 mg at night. 7.  Lopressor 25 mg twice daily. 8.  MiraLax 17 g p.o. daily. 9.  Prilosec 20 mg nightly. 10.  Paxil 20 mg daily. 11.  Zanaflex 2 mg at night as needed for spasm. 12.  Tramadol 50 mg q.6 h. as needed for back pain. 13.  Vitamin C 500 mg daily. 14.  Vitamin D 1000 units daily. She has tolerated her diet here. She had a bowel movement this morning.   She has no new complaints today. She is urinating without issue. SHE IS A FULL CODE STATUS. Per Orthopedics, their recommendations at this time are to proceed to acute rehab, be out of bed, work with Physical Therapy, and they want her to follow up with Dr. Jesús Farr on 04/19 at 1 in the afternoon. Otherwise, she is to follow up with her PCP in 2 weeks, Dr. Antony Matias, Cardiology, in 1 month, and after transfer from [address] [place] [address] and rehab, she is going to go home with [address] [place] [address]. I have discussed the plan of care with the patient. I spent 45 minutes on discharge time today.       MD LAMBERTO Mathis / YESI  D: 04/09/2018 10:39     T: 04/09/2018 11:08  JOB #: [id number]  CC: Denny Sykes MD

## 2018-04-09 NOTE — ROUTINE PROCESS
Bedside and Verbal shift change report given to Erick Adorno RN (oncoming nurse) by Madelyn Joseph RN (offgoing nurse). Report included the following information SBAR, Kardex, Intake/Output, MAR, Recent Results, Med Rec Status and Cardiac Rhythm Afib.

## 2018-04-09 NOTE — PROGRESS NOTES
9:01 AM   Pt is awake, alert, oriented x 3. Lying in bed. Denies pain. Lungs are clear. Abdomen soft with hypoactive BS. Mepilex dressing to lower back was changed. Incision intact with staples. Pt has small skin reddened abrasion on right upper posterior hip . Charge nurse was aware. In to see wound and stated was probably sustained after  tape to bulky original dressing was removed and peeled off skin. Small square mepilex was applied. Plan for pt is to send to rehab today. Pt's O2 was removed. Pt breathing comfortably. Pt has Purewick external cath drainig clear yellow urine. Pt has cardiac monitor on [address] Afib controlled. 9:12 AM   Pt was assisted to sit at edge of bed and back brace applied. Pt was served breakfast.   assisting pt.   10:36 AM   Pt for transfer to [facility] today via Medical transport. 11:30 AM   Report given to Nurse Pio Romano.,  11:44 AM  Seen by Pt. Pt out of bed to [facility]. Pt voided and had small pasty BM. Pt was kept clean and got dressed with street clothes. 1221  Pt picked up by medical transport per stretcher for transfer to [place] [address].

## 2018-04-09 NOTE — PROGRESS NOTES
Problem: Self Care Deficits Care Plan (Adult)  Goal: *Acute Goals and Plan of Care (Insert Text)  Short Term Goals 4/5/18:  Katherine Chaparro OTR/L  In 3 days:  1. Pt will perform LB dressing tasks with min A using AE as needed in order to increase independence with self care. 2.  Pt will perform toilet transfers and toilet routine with min A using AD/DME as needed in order to increase independence with self care. 3.  Pt will improve standing tolerance to 3 minutes without LOB at sink in order to perform grooming tasks with min A. Outcome: Not Met  Occupational Therapy Discharge    Chart reviewed. Attempted Occupational Therapy Treatment, however, patient discharged to Rehab. Pt did not meet goals due to discharge. Will sign off. Thank you for allowing me to assist in the care of this patient.   Samantha Baldwin, OTR/L

## 2018-04-09 NOTE — PROGRESS NOTES
Problem: Mobility Impaired (Adult and Pediatric)  Goal: *Acute Goals and Plan of Care (Insert Text)  In 1-7 days pt will be able to perform:  STG  1. Bed mobility:  Demonstrate proper log-roll technique for safe initiation of rolling for OOB activities. 2.  Supine to sit to supine S with HR for meals. 3.  Sit to stand to sit S with RW/LSO in prep for ambulation. LT.  Gait:  Ambulate 150ft S with RW/LSO, for home/community mobility. 2.  Activity tolerance: Tolerate up in chair 30 minutes-1 hour for ADL's.  3.  Patient/Family Education:  Patient/family to be independent with HEP for follow-up care and safe discharge. Goals revised 2018 to remove stair goal as patient has ramp and does not do stairs. Outcome: Progressing Towards Goal  physical Therapy TREATMENT    Patient: Arnol Green (52 y.o. female)  Date: 2018  Diagnosis: FACET ARTHROSIS,LUMBAR HERNIATED NUCLEUS PULPOSIS W/RADICULOPATHY,LUMBOSACRAL HERNIATED NUCLEUS PULPOSIS W/RADICULOPATHY,LUMBAR SIC DEGENERATION,LUMBOSACRAL [facility] W/OUT MYELOPATHY OR [address] [place] [address]  Spinal stenosis, lumbar Spinal stenosis, lumbar  Procedure(s) (LRB):  L3-S1 DECOMPRESSON AND FUSION W/C-ARM **SPEC POP** (N/A) 5 Days Post-Op  Precautions: WBAT, Spinal, Fall, Back   Chart, physical therapy assessment, plan of care and goals were reviewed. ASSESSMENT:  Pt agreeable to participate. Pt to discharge to rehab today. Assisted pt with dressing per request. Max A required. Pt also requesting assistance to [facility]. Mod A with RW to take steps to [facility]. Manual A for RW management and safety. Pt able to void soft stool. Total A for junior care. Assisted pt back to supine in prep for medical transport. Mild/moderate pain noted with movement. Pt remains ataxic with all upper/lower body movements. Cont POC.     Progression toward goals:  []      Improving appropriately and progressing toward goals  [x]      Improving slowly and progressing toward goals  []      Not making progress toward goals and plan of care will be adjusted     PLAN:  Patient continues to benefit from skilled intervention to address the above impairments. Continue treatment per established plan of care. Discharge Recommendations:  Rehab  Further Equipment Recommendations for Discharge:  rolling walker     SUBJECTIVE:   Patient stated  I need to have a number 2    OBJECTIVE DATA SUMMARY:   Critical Behavior:  Neurologic State: Alert, Appropriate for age  Orientation Level: Oriented X4  Cognition: Appropriate for age attention/concentration  Safety/Judgement: Decreased awareness of need for safety  Functional Mobility Training:  Bed Mobility:  Rolling: Contact guard assistance  Supine to Sit: Minimum assistance  Sit to Supine: Minimum assistance  Transfers:  Sit to Stand: Minimum assistance  Stand to Sit: Minimum assistance  Balance:  Sitting: Intact  Sitting - Static: Good (unsupported)  Sitting - Dynamic: Fair (occasional)  Standing: Impaired; With support  Standing - Static: Fair;Poor  Standing - Dynamic : Poor  Ambulation/Gait Training:  Distance (ft): 5 Feet (ft) (to [facility])  Ambulation - Level of Assistance:  Moderate assistance  Gait Abnormalities: Ataxic;Decreased step clearance;Shuffling gait  Base of Support: Narrowed  Speed/Sheila: Slow  Step Length: Right shortened;Left shortened    Pain:  Pain Scale 1: Numeric (0 - 10)  Pain Intensity 1: 0  Activity Tolerance:   Fair     After treatment:   [] Patient left in no apparent distress sitting up in chair  [x] Patient left in no apparent distress in bed  [x] Call bell left within reach  [] Nursing notified  [x] Caregiver present  [] Bed alarm activated      Jacobo Clarke PTA   Time Calculation: 28 mins

## 2018-04-09 NOTE — PROGRESS NOTES
Progress Note POD #5      Patient: Tahir Pate               Sex: female          DOA: 4/4/2018         YOB: 1945      Surgery: Procedure(s):  L3-S1 DECOMPRESSON AND FUSION W/C-ARM **SPEC POP**           LOS: 5 days               Subjective:      C/O bilateral leg soreness, calf & thigh. Objective:      Visit Vitals    /49 (BP 1 Location: Right arm, BP Patient Position: At rest)    Pulse 66    Temp 97.4 °F (36.3 °C)    Resp 18    Ht 4' 10\" (1.473 m)    Wt 64 kg (141 lb 3.2 oz)    SpO2 100%    Breastfeeding No    BMI 29.51 kg/m2       Physical Exam:  Neurological: motor strength: 5/5 in lower extremities bilaterally                          sensation: intact to light touch  Patient mobility  Bed Mobility Training  Rolling: Contact guard assistance  Supine to Sit: Minimum assistance, Moderate assistance  Sit to Supine: Minimum assistance  Scooting: Supervision  Transfer Training  Sit to Stand: Minimum assistance  Stand to Sit: Minimum assistance  Bed to Chair: Moderate assistance      Gait Training  Assistive Device: Walker, rolling, Gait belt, Brace/Splint  Ambulation - Level of Assistance:  Moderate assistance  Distance (ft): 5 Feet (ft) (side steps to chair )       Patient Response  Patient Response: Fair-    Intake and Output:  Current Shift:     Last three shifts:  04/07 1901 - 04/09 0700  In: 5 [P.O.:540]  Out: 850 [Urine:850]    Lab/Data Reviewed:    Lab/Data Reviewed:  Lab Results   Component Value Date/Time    WBC 7.0 04/09/2018 03:00 AM    HGB 8.0 (L) 04/09/2018 03:00 AM    HCT 24.8 (L) 04/09/2018 03:00 AM    PLATELET 218 38/61/7723 03:00 AM    MCV 97.3 (H) 04/09/2018 03:00 AM     Lab Results   Component Value Date/Time    aPTT 27.5 05/13/2014 11:00 AM     Lab Results   Component Value Date/Time    INR 1.0 05/13/2014 11:00 AM    Prothrombin time 12.9 05/13/2014 11:00 AM            Assessment/Plan     Active Problems:    DDD (degenerative disc disease), lumbar (3/26/2018)      Spondylolisthesis of lumbar region (3/29/2018)      Scoliosis of thoracolumbar spine (3/29/2018)      SIRS (systemic inflammatory response syndrome) ([facility] [address] (4/5/2018)      History of back surgery (4/5/2018)      Atelectasis (4/6/2018)      New onset a-fib ([facility] [address] (4/6/2018)        1. Stable  2. OOB with PT  3. D/C Planning  4. Low normal BP  5. Mild acute blood loss anemia  6. Transfer to SNF  7.  Change dressing

## 2018-04-11 LAB
BACTERIA SPEC CULT: NORMAL
BACTERIA SPEC CULT: NORMAL
SERVICE CMNT-IMP: NORMAL
SERVICE CMNT-IMP: NORMAL

## 2020-01-08 ENCOUNTER — APPOINTMENT (OUTPATIENT)
Dept: CT IMAGING | Age: 75
End: 2020-01-08
Attending: EMERGENCY MEDICINE
Payer: MEDICARE

## 2020-01-08 ENCOUNTER — HOSPITAL ENCOUNTER (EMERGENCY)
Age: 75
Discharge: HOME | End: 2020-01-08
Attending: EMERGENCY MEDICINE
Payer: MEDICARE

## 2020-01-08 ENCOUNTER — APPOINTMENT (OUTPATIENT)
Dept: GENERAL RADIOLOGY | Age: 75
End: 2020-01-08
Attending: EMERGENCY MEDICINE
Payer: MEDICARE

## 2020-01-08 VITALS
SYSTOLIC BLOOD PRESSURE: 134 MMHG | RESPIRATION RATE: 16 BRPM | OXYGEN SATURATION: 97 % | WEIGHT: 122 LBS | TEMPERATURE: 97.7 F | BODY MASS INDEX: 25.5 KG/M2 | HEART RATE: 91 BPM | DIASTOLIC BLOOD PRESSURE: 67 MMHG

## 2020-01-08 DIAGNOSIS — R10.13 ABDOMINAL PAIN, EPIGASTRIC: Primary | ICD-10-CM

## 2020-01-08 DIAGNOSIS — K27.9 PEPTIC ULCER: ICD-10-CM

## 2020-01-08 LAB
ALBUMIN SERPL-MCNC: 4.6 G/DL (ref 3.4–5)
ALBUMIN/GLOB SERPL: 1.4 {RATIO} (ref 0.8–1.7)
ALP SERPL-CCNC: 101 U/L (ref 45–117)
ALT SERPL-CCNC: 22 U/L (ref 13–56)
ANION GAP SERPL CALC-SCNC: 10 MMOL/L (ref 3–18)
AST SERPL-CCNC: 18 U/L (ref 10–38)
ATRIAL RATE: 63 BPM
BASOPHILS # BLD: 0 K/UL (ref 0–0.1)
BASOPHILS NFR BLD: 0 % (ref 0–2)
BILIRUB SERPL-MCNC: 0.7 MG/DL (ref 0.2–1)
BUN SERPL-MCNC: 22 MG/DL (ref 7–18)
BUN/CREAT SERPL: 24 (ref 12–20)
CALCIUM SERPL-MCNC: 10.5 MG/DL (ref 8.5–10.1)
CALCULATED P AXIS, ECG09: 59 DEGREES
CALCULATED R AXIS, ECG10: 56 DEGREES
CALCULATED T AXIS, ECG11: 62 DEGREES
CHLORIDE SERPL-SCNC: 101 MMOL/L (ref 100–111)
CK MB CFR SERPL CALC: 1.9 % (ref 0–4)
CK MB SERPL-MCNC: 1.1 NG/ML (ref 5–25)
CK SERPL-CCNC: 59 U/L (ref 26–192)
CO2 SERPL-SCNC: 27 MMOL/L (ref 21–32)
CREAT SERPL-MCNC: 0.92 MG/DL (ref 0.6–1.3)
DIAGNOSIS, 93000: NORMAL
DIFFERENTIAL METHOD BLD: ABNORMAL
EOSINOPHIL # BLD: 0.1 K/UL (ref 0–0.4)
EOSINOPHIL NFR BLD: 1 % (ref 0–5)
ERYTHROCYTE [DISTWIDTH] IN BLOOD BY AUTOMATED COUNT: 13 % (ref 11.6–14.5)
GLOBULIN SER CALC-MCNC: 3.4 G/DL (ref 2–4)
GLUCOSE SERPL-MCNC: 111 MG/DL (ref 74–99)
HCT VFR BLD AUTO: 44.2 % (ref 35–45)
HGB BLD-MCNC: 14.3 G/DL (ref 12–16)
LIPASE SERPL-CCNC: 82 U/L (ref 73–393)
LYMPHOCYTES # BLD: 0.9 K/UL (ref 0.9–3.6)
LYMPHOCYTES NFR BLD: 7 % (ref 21–52)
MCH RBC QN AUTO: 31.3 PG (ref 24–34)
MCHC RBC AUTO-ENTMCNC: 32.4 G/DL (ref 31–37)
MCV RBC AUTO: 96.7 FL (ref 74–97)
MONOCYTES # BLD: 0.6 K/UL (ref 0.05–1.2)
MONOCYTES NFR BLD: 5 % (ref 3–10)
NEUTS SEG # BLD: 11 K/UL (ref 1.8–8)
NEUTS SEG NFR BLD: 87 % (ref 40–73)
P-R INTERVAL, ECG05: 164 MS
PLATELET # BLD AUTO: 323 K/UL (ref 135–420)
PMV BLD AUTO: 10 FL (ref 9.2–11.8)
POTASSIUM SERPL-SCNC: 3.9 MMOL/L (ref 3.5–5.5)
PROT SERPL-MCNC: 8 G/DL (ref 6.4–8.2)
Q-T INTERVAL, ECG07: 432 MS
QRS DURATION, ECG06: 90 MS
QTC CALCULATION (BEZET), ECG08: 442 MS
RBC # BLD AUTO: 4.57 M/UL (ref 4.2–5.3)
SODIUM SERPL-SCNC: 138 MMOL/L (ref 136–145)
TROPONIN I SERPL-MCNC: <0.02 NG/ML (ref 0–0.04)
VENTRICULAR RATE, ECG03: 63 BPM
WBC # BLD AUTO: 12.6 K/UL (ref 4.6–13.2)

## 2020-01-08 PROCEDURE — 71045 X-RAY EXAM CHEST 1 VIEW: CPT

## 2020-01-08 PROCEDURE — 93005 ELECTROCARDIOGRAM TRACING: CPT

## 2020-01-08 PROCEDURE — 85025 COMPLETE CBC W/AUTO DIFF WBC: CPT

## 2020-01-08 PROCEDURE — 96375 TX/PRO/DX INJ NEW DRUG ADDON: CPT

## 2020-01-08 PROCEDURE — 74011250637 HC RX REV CODE- 250/637: Performed by: EMERGENCY MEDICINE

## 2020-01-08 PROCEDURE — 74011250636 HC RX REV CODE- 250/636: Performed by: EMERGENCY MEDICINE

## 2020-01-08 PROCEDURE — 82550 ASSAY OF CK (CPK): CPT

## 2020-01-08 PROCEDURE — 74011636320 HC RX REV CODE- 636/320: Performed by: EMERGENCY MEDICINE

## 2020-01-08 PROCEDURE — 74177 CT ABD & PELVIS W/CONTRAST: CPT

## 2020-01-08 PROCEDURE — 99285 EMERGENCY DEPT VISIT HI MDM: CPT

## 2020-01-08 PROCEDURE — 74011000250 HC RX REV CODE- 250: Performed by: EMERGENCY MEDICINE

## 2020-01-08 PROCEDURE — 80053 COMPREHEN METABOLIC PANEL: CPT

## 2020-01-08 PROCEDURE — 83690 ASSAY OF LIPASE: CPT

## 2020-01-08 PROCEDURE — 96374 THER/PROPH/DIAG INJ IV PUSH: CPT

## 2020-01-08 RX ORDER — SUCRALFATE 1 G/1
1 TABLET ORAL 4 TIMES DAILY
Qty: 120 TAB | Refills: 0 | Status: SHIPPED | OUTPATIENT
Start: 2020-01-08 | End: 2020-02-07

## 2020-01-08 RX ORDER — BROMPHENIRAMINE MALEATE, DEXTROMETHORPHAN HBR, PHENYLEPHRINE HCL, DIPHENHYDRAMINE HCL, PHENYLEPHRINE HCL 0.52G
1 KIT ORAL DAILY
Qty: 30 CAP | Refills: 0 | Status: SHIPPED | OUTPATIENT
Start: 2020-01-08 | End: 2020-02-07

## 2020-01-08 RX ORDER — FAMOTIDINE 10 MG/ML
20 INJECTION INTRAVENOUS
Status: COMPLETED | OUTPATIENT
Start: 2020-01-08 | End: 2020-01-08

## 2020-01-08 RX ORDER — POLYETHYLENE GLYCOL 3350 17 G/17G
17 POWDER, FOR SOLUTION ORAL DAILY
Qty: 300 G | Refills: 0 | Status: SHIPPED | OUTPATIENT
Start: 2020-01-08

## 2020-01-08 RX ORDER — FAMOTIDINE 20 MG/1
20 TABLET, FILM COATED ORAL
Qty: 30 TAB | Refills: 0 | Status: SHIPPED | OUTPATIENT
Start: 2020-01-08

## 2020-01-08 RX ORDER — ONDANSETRON 2 MG/ML
4 INJECTION INTRAMUSCULAR; INTRAVENOUS
Status: COMPLETED | OUTPATIENT
Start: 2020-01-08 | End: 2020-01-08

## 2020-01-08 RX ORDER — OMEPRAZOLE 20 MG/1
20 CAPSULE, DELAYED RELEASE ORAL DAILY
Qty: 14 CAP | Refills: 0 | Status: SHIPPED | OUTPATIENT
Start: 2020-01-08 | End: 2020-01-22

## 2020-01-08 RX ORDER — MORPHINE SULFATE 4 MG/ML
2 INJECTION INTRAVENOUS
Status: COMPLETED | OUTPATIENT
Start: 2020-01-08 | End: 2020-01-08

## 2020-01-08 RX ADMIN — ONDANSETRON 4 MG: 2 INJECTION INTRAMUSCULAR; INTRAVENOUS at 08:15

## 2020-01-08 RX ADMIN — FAMOTIDINE 20 MG: 10 INJECTION, SOLUTION INTRAVENOUS at 08:15

## 2020-01-08 RX ADMIN — MORPHINE SULFATE 2 MG: 4 INJECTION INTRAVENOUS at 09:24

## 2020-01-08 RX ADMIN — IOPAMIDOL 100 ML: 612 INJECTION, SOLUTION INTRAVENOUS at 08:52

## 2020-01-08 RX ADMIN — LIDOCAINE HYDROCHLORIDE 40 ML: 20 SOLUTION ORAL; TOPICAL at 08:15

## 2020-01-08 NOTE — ED PROVIDER NOTES
EMERGENCY DEPARTMENT HISTORY AND PHYSICAL EXAM    Date: 1/8/2020  Patient Name: Tiago Escobar    History of Presenting Illness     Chief Complaint   Patient presents with    Abdominal Pain          History Provided By: Patient    Roshan Calles is a 75 y.o. female with PMHX of GERD, cerebellar ataxia who presents to the emergency department C/O that epigastric pain since 2 AM.  Patient reports sharp epigastric pain and not be able to sleep all night. States up until early this morning she is otherwise been a state of normal health. She is also having some nausea and nonbilious nonbloody emesis. No sick contacts. Patient reports chronic constipation, last bowel movement 2 days ago. No urinary symptoms. No fever. PCP: Karen Castellon MD    Current Outpatient Medications   Medication Sig Dispense Refill    omeprazole (PRILOSEC) 20 mg capsule Take 1 Cap by mouth daily for 14 days. 14 Cap 0    famotidine (PEPCID) 20 mg tablet Take 1 Tab by mouth two (2) times daily as needed for Pain or Nausea for up to 30 doses. 30 Tab 0    psyllium (METAMUCIL) 0.52 gram capsule Take 1 Cap by mouth daily for 30 days. 30 Cap 0    sucralfate (CARAFATE) 1 gram tablet Take 1 Tab by mouth four (4) times daily for 30 days. 120 Tab 0    polyethylene glycol (MIRALAX) 17 gram/dose powder Take 17 g by mouth daily. 1 tablespoon with 8 oz of water daily 300 g 0    digoxin (LANOXIN) 0.125 mg tablet Take 1 Tab by mouth daily. 30 Tab 0    metoprolol tartrate (LOPRESSOR) 25 mg tablet Take 1 Tab by mouth every twelve (12) hours. 60 Tab 0    traMADol (ULTRAM) 50 mg tablet Take 1 Tab by mouth every six (6) hours as needed. Max Daily Amount: 200 mg. 50 Tab 0    tiZANidine (ZANAFLEX) 2 mg tablet Take 1 mg by mouth nightly.  gabapentin (NEURONTIN) 100 mg capsule Take 400 mg by mouth nightly.  CALCIUM CARBONATE/VITAMIN D3 (CALCIUM 500 + D PO) Take 1 Tab by mouth two (2) times a day.       MULTIVIT-MIN/IRON/FOLIC/LUTEIN (CENTRUM SILVER WOMEN PO) Take  by mouth daily.  ACETAMINOPHEN/DIPHENHYDRAMINE (TYLENOL PM PO) Take 2 Tabs by mouth nightly.  PARoxetine (PAXIL) 20 mg tablet Take 20 mg by mouth daily.  ascorbic acid (VITAMIN C) 500 mg tablet Take 500 mg by mouth daily.  cholecalciferol (VITAMIN D3) 1,000 unit tablet Take 1,000 Units by mouth daily.  omega-3 fatty acids-vitamin e (FISH OIL) 1,000 mg Cap Take 1 Cap by mouth daily. Past History     Past Medical History:  Past Medical History:   Diagnosis Date    Arthritis     Chronic pain     lower back    GERD (gastroesophageal reflux disease)     Ill-defined condition     Gita cerebella ataxia    Menopause     Age 47    Other ill-defined conditions(799.89)     Gita's Cerebellar Ataxia    Other ill-defined conditions(799.89)     Pessary    Psychiatric disorder     depression       Past Surgical History:  Past Surgical History:   Procedure Laterality Date    HX CERVICAL FUSION  2012    anterior    HX GI      prolapse rectum    HX HEENT      OS muscle surgery    HX ORTHOPAEDIC      Right knee ORIF    HX ORTHOPAEDIC  2014    right total hip    HX OTHER SURGICAL      repair rectal prolapse       Family History:  Family History   Problem Relation Age of Onset    Breast Cancer Maternal Aunt        Social History:  Social History     Tobacco Use    Smoking status: Never Smoker    Smokeless tobacco: Never Used   Substance Use Topics    Alcohol use: Yes     Alcohol/week: 0.8 standard drinks     Types: 1 Glasses of wine per week    Drug use: No       Allergies:  No Known Allergies      Review of Systems   Review of Systems   Constitutional: Negative for chills and fever. HENT: Negative for congestion, rhinorrhea and sinus pain. Respiratory: Negative for cough and shortness of breath. Cardiovascular: Negative for chest pain and palpitations. Gastrointestinal: Positive for abdominal pain, nausea and vomiting.  Negative for abdominal distention, blood in stool and diarrhea. Genitourinary: Negative for difficulty urinating and dysuria. Musculoskeletal: Negative for back pain and neck pain. Skin: Negative for color change and rash. Neurological: Negative for dizziness and headaches. All other systems reviewed and are negative. Physical Exam     Vitals:    01/08/20 0644 01/08/20 0717 01/08/20 0830 01/08/20 0923   BP: 144/69 144/68 130/60 142/59   Pulse: 65 72 77 72   Resp: 18 18 15 17   Temp: 97.7 °F (36.5 °C)      SpO2: 100% 100% 92% 98%   Weight: 55.3 kg (122 lb)        Physical Exam    Nursing notes and vital signs reviewed    Constitutional: Non toxic appearing, appears uncomfortable,  Head: Normocephalic, Atraumatic  Eyes: Pupils are equal, round, and reactive to light, EOMI  Neck: Supple  Cardiovascular: Regular rate and rhythm, no murmurs, rubs, or gallops  Chest: Normal work of breathing and chest excursion bilaterally  Lungs: Clear to ausculation bilaterally  Abdomen: Soft, very tender to touch epigastrium, voluntary guarding, no rebounding, no lower abdominal pain  Back: No evidence of trauma or deformity  Extremities: No evidence of trauma or deformity, no LE edema  Skin: Warm and dry, normal cap refill  Neuro: Alert and appropriate, CN intact, normal speech    Diagnostic Study Results     Labs -     Recent Results (from the past 12 hour(s))   EKG, 12 LEAD, INITIAL    Collection Time: 01/08/20  6:52 AM   Result Value Ref Range    Ventricular Rate 63 BPM    Atrial Rate 63 BPM    P-R Interval 164 ms    QRS Duration 90 ms    Q-T Interval 432 ms    QTC Calculation (Bezet) 442 ms    Calculated P Axis 59 degrees    Calculated R Axis 56 degrees    Calculated T Axis 62 degrees    Diagnosis       Normal sinus rhythm  Nonspecific T wave abnormality  Abnormal ECG  When compared with ECG of 06-APR-2018 08:25,  Sinus rhythm has replaced Atrial fibrillation  Vent.  rate has decreased BY  60 BPM  Nonspecific T wave abnormality no longer evident in Inferior leads  T wave inversion no longer evident in Lateral leads     CBC WITH AUTOMATED DIFF    Collection Time: 01/08/20  7:25 AM   Result Value Ref Range    WBC 12.6 4.6 - 13.2 K/uL    RBC 4.57 4.20 - 5.30 M/uL    HGB 14.3 12.0 - 16.0 g/dL    HCT 44.2 35.0 - 45.0 %    MCV 96.7 74.0 - 97.0 FL    MCH 31.3 24.0 - 34.0 PG    MCHC 32.4 31.0 - 37.0 g/dL    RDW 13.0 11.6 - 14.5 %    PLATELET 058 398 - 898 K/uL    MPV 10.0 9.2 - 11.8 FL    NEUTROPHILS 87 (H) 40 - 73 %    LYMPHOCYTES 7 (L) 21 - 52 %    MONOCYTES 5 3 - 10 %    EOSINOPHILS 1 0 - 5 %    BASOPHILS 0 0 - 2 %    ABS. NEUTROPHILS 11.0 (H) 1.8 - 8.0 K/UL    ABS. LYMPHOCYTES 0.9 0.9 - 3.6 K/UL    ABS. MONOCYTES 0.6 0.05 - 1.2 K/UL    ABS. EOSINOPHILS 0.1 0.0 - 0.4 K/UL    ABS. BASOPHILS 0.0 0.0 - 0.1 K/UL    DF AUTOMATED     METABOLIC PANEL, COMPREHENSIVE    Collection Time: 01/08/20  7:25 AM   Result Value Ref Range    Sodium 138 136 - 145 mmol/L    Potassium 3.9 3.5 - 5.5 mmol/L    Chloride 101 100 - 111 mmol/L    CO2 27 21 - 32 mmol/L    Anion gap 10 3.0 - 18 mmol/L    Glucose 111 (H) 74 - 99 mg/dL    BUN 22 (H) 7.0 - 18 MG/DL    Creatinine 0.92 0.6 - 1.3 MG/DL    BUN/Creatinine ratio 24 (H) 12 - 20      GFR est AA >60 >60 ml/min/1.73m2    GFR est non-AA 60 (L) >60 ml/min/1.73m2    Calcium 10.5 (H) 8.5 - 10.1 MG/DL    Bilirubin, total 0.7 0.2 - 1.0 MG/DL    ALT (SGPT) 22 13 - 56 U/L    AST (SGOT) 18 10 - 38 U/L    Alk.  phosphatase 101 45 - 117 U/L    Protein, total 8.0 6.4 - 8.2 g/dL    Albumin 4.6 3.4 - 5.0 g/dL    Globulin 3.4 2.0 - 4.0 g/dL    A-G Ratio 1.4 0.8 - 1.7     LIPASE    Collection Time: 01/08/20  7:25 AM   Result Value Ref Range    Lipase 82 73 - 393 U/L   CARDIAC PANEL,(CK, CKMB & TROPONIN)    Collection Time: 01/08/20  7:25 AM   Result Value Ref Range    CK 59 26 - 192 U/L    CK - MB 1.1 <3.6 ng/ml    CK-MB Index 1.9 0.0 - 4.0 %    Troponin-I, QT <0.02 0.0 - 0.045 NG/ML       Radiologic Studies -   CT ABD PELV W CONT Final Result   IMPRESSION:      Edematous, thickened appearance of the proximal duodenum. Additionally there is   focal gas-containing outpouching near the pylorus as well, underlying ulceration   not excluded. No free air or fluid collection. Consider endoscopy for further   evaluation if there is clinical concern for peptic or duodenal ulcer. Mild distention of a few small bowel loops in the midabdomen. Would favor ileus   over obstruction. Recommend radiographic follow-up. XR CHEST PORT   Final Result   IMPRESSION:      No acute pulmonary process identified. CT Results  (Last 48 hours)               01/08/20 0907  CT ABD PELV W CONT Final result    Impression:  IMPRESSION:       Edematous, thickened appearance of the proximal duodenum. Additionally there is   focal gas-containing outpouching near the pylorus as well, underlying ulceration   not excluded. No free air or fluid collection. Consider endoscopy for further   evaluation if there is clinical concern for peptic or duodenal ulcer. Mild distention of a few small bowel loops in the midabdomen. Would favor ileus   over obstruction. Recommend radiographic follow-up. Narrative:  EXAM: CT of the abdomen and pelvis       INDICATION: Pain. COMPARISON: None. TECHNIQUE: Axial CT imaging of the abdomen and pelvis was performed intravenous   contrast. Multiplanar reformats were generated. One or more dose reduction techniques were used on this CT: automated exposure   control, adjustment of the mAs and/or kVp according to patient's size, and   iterative reconstruction techniques. The specific techniques utilized on this CT   exam have been documented in the patient's electronic medical record.       _______________       FINDINGS:       LOWER CHEST: Unremarkable. LIVER, BILIARY: Liver demonstrates small cyst in the right lobe otherwise   unremarkable. . No biliary dilation. Gallbladder is unremarkable. PANCREAS: Normal.       SPLEEN: Normal.       ADRENALS: Normal.       KIDNEYS/URETERS/BLADDER: Normal.       PELVIC ORGANS: Unremarkable. VASCULATURE: Unremarkable       LYMPH NODES: No enlarged lymph nodes. GASTROINTESTINAL TRACT: Small hiatal hernia present. Mild distention of a few   small bowel loops in the midabdomen measuring up to 2.5 cm in diameter. No focal   transition point. Moderate retained stool in the colon. There is an edematous   appearance of the proximal duodenum near the pylorus as seen on axial image 37,   and focal outpouching of gas at the junction of the pylorus and stomach antrum   as seen on coronal image number 18. No gross free air or fluid collection. BONES: No acute or aggressive osseous abnormalities identified. Mild L1   compression deformity likely chronic in nature. L3-S1 fusion hardware is intact. OTHER: None.       _______________               CXR Results  (Last 48 hours)               01/08/20 0740  XR CHEST PORT Final result    Impression:  IMPRESSION:       No acute pulmonary process identified. Narrative:  EXAM: One-view chest       CLINICAL HISTORY: epigastric pain ,       COMPARISON: None       FINDINGS:       Frontal view of the chest demonstrate clear lungs. Cardiac silhouette is normal   in size and contour. No acute bony or soft tissue abnormality. Medications given in the ED-  Medications   mylanta/viscous lidocaine (GI COCKTAIL) (40 mL Oral Given 1/8/20 0815)   ondansetron (ZOFRAN) injection 4 mg (4 mg IntraVENous Given 1/8/20 0815)   famotidine (PF) (PEPCID) injection 20 mg (20 mg IntraVENous Given 1/8/20 0815)   iopamidol (ISOVUE 300) 61 % contrast injection 100 mL (100 mL IntraVENous Given 1/8/20 0852)   morphine injection 2 mg (2 mg IntraVENous Given 1/8/20 0924)         Medical Decision Making   I am the first provider for this patient.     I reviewed the vital signs, available nursing notes, past medical history, past surgical history, family history and social history. Vital Signs-Reviewed the patient's vital signs. Pulse Oximetry Analysis - 100% on ra         EKG interpretation: (Preliminary)  EKG read by Dr. Macy Sheppard at 0050   Normal sinus rhythm, normal axis, normal intervals, T wave inversion anterior septal leads, otherwise unremarkable EKG    Records Reviewed: Nursing Notes    Provider Notes (Medical Decision Making): Cody Naranjo is a 75 y.o. female presents with 5 hours of severe epigastric pain. Ports history of GERD. Patient appears very uncomfortable with gastric tenderness. Labs have been ordered, check cardiac enzymes although doubt anginal equivalent, plan on CT abdomen pelvis evaluate for perf, pancreatitis, biliary disease. Procedures:  Procedures    ED Course:   10:34 AM  CT demonstrates duodenum inflammation and focal outpouching gas near pylorus which is been interpreted as likely peptic ulcer disease. There is no indications of perforation. Also has ileus/constipation. Patient feels better my reevaluation with a soft abdomen. She does report taking NSAIDs for pain. Instructed patient to discontinue all NSAIDs. Will treat for PUD. I referred patient to GI. If symptoms get worse or she develops signs of perforation which I discussed with both the patient and her  she needs to return to emergency department immediately. Diagnosis and Disposition     Critical Care:     DISCHARGE NOTE:    Lamberto Villaseñor's  results have been reviewed with her. She has been counseled regarding her diagnosis, treatment, and plan. She verbally conveys understanding and agreement of the signs, symptoms, diagnosis, treatment and prognosis and additionally agrees to follow up as discussed. She also agrees with the care-plan and conveys that all of her questions have been answered.   I have also provided discharge instructions for her that include: educational information regarding their diagnosis and treatment, and list of reasons why they would want to return to the ED prior to their follow-up appointment, should her condition change. She has been provided with education for proper emergency department utilization. CLINICAL IMPRESSION:    1. Abdominal pain, epigastric    2. Peptic ulcer        PLAN:  1. D/C Home  2. Current Discharge Medication List      START taking these medications    Details   famotidine (PEPCID) 20 mg tablet Take 1 Tab by mouth two (2) times daily as needed for Pain or Nausea for up to 30 doses. Qty: 30 Tab, Refills: 0      psyllium (METAMUCIL) 0.52 gram capsule Take 1 Cap by mouth daily for 30 days. Qty: 30 Cap, Refills: 0      sucralfate (CARAFATE) 1 gram tablet Take 1 Tab by mouth four (4) times daily for 30 days. Qty: 120 Tab, Refills: 0         CONTINUE these medications which have CHANGED    Details   omeprazole (PRILOSEC) 20 mg capsule Take 1 Cap by mouth daily for 14 days. Qty: 14 Cap, Refills: 0      polyethylene glycol (MIRALAX) 17 gram/dose powder Take 17 g by mouth daily. 1 tablespoon with 8 oz of water daily  Qty: 300 g, Refills: 0         STOP taking these medications       aspirin delayed-release 81 mg tablet Comments:   Reason for Stopping:             3.   Follow-up Information     Follow up With Specialties Details Why Contact Info    Yen Bowden MD Internal Medicine Schedule an appointment as soon as possible for a visit   [address]  [phone]      Timur Conklin MD Gastroenterology Schedule an appointment as soon as possible for a visit in 1 week  [address] [facility]           _______________________________      Please note that this dictation was completed with videoNEXT, the computer voice recognition software.   Quite often unanticipated grammatical, syntax, homophones, and other interpretive errors are inadvertently transcribed by the computer software. Please disregard these errors. Please excuse any errors that have escaped final proofreading.

## 2020-01-08 NOTE — DISCHARGE INSTRUCTIONS
Your CT shows what is likely a ulcer in your duodenum. Please stop taking all NSAIDs such as ibuprofen, Aleve, or Advil. Please follow-up with GI, Dr Christopher Shah and discuss further evaluation    If you get bad abdominal pain, fever, or severe nausea and vomiting, or other concerning symptoms please return immediately to the emergency department.

## 2020-01-08 NOTE — ED TRIAGE NOTES
Arrives via ems from home with c/o upper abd pain that awoke her from sleep. Pt states she vomited twice prior to ems arrival.  Pt states \"I think I had some bad food last night. \"

## 2021-12-11 ENCOUNTER — APPOINTMENT (OUTPATIENT)
Dept: GENERAL RADIOLOGY | Age: 76
End: 2021-12-11
Attending: EMERGENCY MEDICINE
Payer: MEDICARE

## 2021-12-11 ENCOUNTER — HOSPITAL ENCOUNTER (EMERGENCY)
Age: 76
Discharge: HOME | End: 2021-12-11
Attending: EMERGENCY MEDICINE
Payer: MEDICARE

## 2021-12-11 VITALS
TEMPERATURE: 98.2 F | SYSTOLIC BLOOD PRESSURE: 121 MMHG | WEIGHT: 120 LBS | RESPIRATION RATE: 16 BRPM | DIASTOLIC BLOOD PRESSURE: 63 MMHG | HEIGHT: 58 IN | BODY MASS INDEX: 25.19 KG/M2 | HEART RATE: 72 BPM | OXYGEN SATURATION: 99 %

## 2021-12-11 DIAGNOSIS — S39.012A LUMBAR STRAIN, INITIAL ENCOUNTER: Primary | ICD-10-CM

## 2021-12-11 LAB
APPEARANCE UR: CLEAR
BACTERIA URNS QL MICRO: ABNORMAL /HPF
BILIRUB UR QL: NEGATIVE
COLOR UR: YELLOW
EPITH CASTS URNS QL MICRO: ABNORMAL /LPF (ref 0–5)
GLUCOSE UR STRIP.AUTO-MCNC: NEGATIVE MG/DL
HGB UR QL STRIP: NEGATIVE
KETONES UR QL STRIP.AUTO: NEGATIVE MG/DL
LEUKOCYTE ESTERASE UR QL STRIP.AUTO: ABNORMAL
NITRITE UR QL STRIP.AUTO: NEGATIVE
PH UR STRIP: 6 [PH] (ref 5–8)
PROT UR STRIP-MCNC: NEGATIVE MG/DL
RBC #/AREA URNS HPF: ABNORMAL /HPF (ref 0–5)
SP GR UR REFRACTOMETRY: 1.02 (ref 1–1.03)
UROBILINOGEN UR QL STRIP.AUTO: 1 EU/DL (ref 0.2–1)
WBC URNS QL MICRO: ABNORMAL /HPF (ref 0–5)

## 2021-12-11 PROCEDURE — 74011250637 HC RX REV CODE- 250/637: Performed by: EMERGENCY MEDICINE

## 2021-12-11 PROCEDURE — 99284 EMERGENCY DEPT VISIT MOD MDM: CPT

## 2021-12-11 PROCEDURE — 81001 URINALYSIS AUTO W/SCOPE: CPT

## 2021-12-11 PROCEDURE — 72100 X-RAY EXAM L-S SPINE 2/3 VWS: CPT

## 2021-12-11 RX ORDER — METHOCARBAMOL 500 MG/1
500 TABLET, FILM COATED ORAL ONCE
Status: COMPLETED | OUTPATIENT
Start: 2021-12-11 | End: 2021-12-11

## 2021-12-11 RX ORDER — ACETAMINOPHEN 500 MG
1000 TABLET ORAL ONCE
Status: COMPLETED | OUTPATIENT
Start: 2021-12-11 | End: 2021-12-11

## 2021-12-11 RX ORDER — METHOCARBAMOL 500 MG/1
500 TABLET, FILM COATED ORAL
Qty: 24 TABLET | Refills: 0 | OUTPATIENT
Start: 2021-12-11 | End: 2022-03-27

## 2021-12-11 RX ADMIN — ACETAMINOPHEN 1000 MG: 500 TABLET ORAL at 13:36

## 2021-12-11 RX ADMIN — METHOCARBAMOL TABLETS 500 MG: 500 TABLET, COATED ORAL at 13:36

## 2021-12-11 NOTE — ED PROVIDER NOTES
EMERGENCY DEPARTMENT HISTORY AND PHYSICAL EXAM      Date: 12/11/2021  Patient Name: Bony Key    History of Presenting Illness     Chief Complaint   Patient presents with    Back Pain       History (Context): Bony Key is a 67 y.o. female with prior history of cervical and lumbar spine surgeries, unclear which type or year who presents with acute onset right paraspinal pain, nonradiating that began 2 days ago. Has been constant since onset. Has not taken anything for symptom relief. Symptoms worsen with standing, ambulation and improve with rest, sitting. Patient denies recent back trauma, leg weakness, new onset  numbness/tingling, bowel/bladder incontinence, gait imbalance. Denies recent spinal injections or surgeries. does have history of right hip surgery. Patient is able to ambulate and bear weight on her right leg without significant discomfort. Patient presents with daughter and . PCP: Byron Del Rio MD    Current Outpatient Medications   Medication Sig Dispense Refill    methocarbamoL (Robaxin) 500 mg tablet Take 1 Tablet by mouth four (4) times daily as needed for Muscle Spasm(s). 24 Tablet 0    famotidine (PEPCID) 20 mg tablet Take 1 Tab by mouth two (2) times daily as needed for Pain or Nausea for up to 30 doses. 30 Tab 0    polyethylene glycol (MIRALAX) 17 gram/dose powder Take 17 g by mouth daily. 1 tablespoon with 8 oz of water daily 300 g 0    metoprolol tartrate (LOPRESSOR) 25 mg tablet Take 1 Tab by mouth every twelve (12) hours. 60 Tab 0    gabapentin (NEURONTIN) 100 mg capsule Take 400 mg by mouth nightly.  CALCIUM CARBONATE/VITAMIN D3 (CALCIUM 500 + D PO) Take 1 Tab by mouth two (2) times a day.  MULTIVIT-MIN/IRON/FOLIC/LUTEIN (CENTRUM SILVER WOMEN PO) Take  by mouth daily.  ACETAMINOPHEN/DIPHENHYDRAMINE (TYLENOL PM PO) Take 2 Tabs by mouth nightly.  PARoxetine (PAXIL) 20 mg tablet Take 20 mg by mouth daily.         ascorbic acid (VITAMIN C) 500 mg tablet Take 500 mg by mouth daily.  cholecalciferol (VITAMIN D3) 1,000 unit tablet Take 1,000 Units by mouth daily.  omega-3 fatty acids-vitamin e (FISH OIL) 1,000 mg Cap Take 1 Cap by mouth daily. Past History     Past Medical History:  Past Medical History:   Diagnosis Date    Arthritis     Chronic pain     lower back    GERD (gastroesophageal reflux disease)     Ill-defined condition     Gita cerebella ataxia    Menopause     Age 47    Other ill-defined conditions(799.89)     Gita's Cerebellar Ataxia    Other ill-defined conditions(799.89)     Pessary    Psychiatric disorder     depression       Past Surgical History:  Past Surgical History:   Procedure Laterality Date    HX CERVICAL FUSION  2012    anterior    HX GI      prolapse rectum    HX HEENT      OS muscle surgery    HX ORTHOPAEDIC      Right knee ORIF    HX ORTHOPAEDIC  2014    right total hip    HX OTHER SURGICAL      repair rectal prolapse       Family History:  Family History   Problem Relation Age of Onset    Breast Cancer Maternal Aunt        Social History:  Social History     Tobacco Use    Smoking status: Never Smoker    Smokeless tobacco: Never Used   Substance Use Topics    Alcohol use: Yes     Alcohol/week: 0.8 standard drinks     Types: 1 Glasses of wine per week    Drug use: No       Allergies:  No Known Allergies    PMH, PSH, family history, social history, allergies reviewed with the patient with significant items noted above. Review of Systems   Gen: Denies fever, chills, fatigue  Cardiac: Denies chest pain. Pulm: Denies cough, shortness of breath  GI: Denies abdominal pain, nausea, vomiting. : Denies hematuria, dysuria, flank pain. Neuro: Positive for chronic  right foot numbness since hip surgery, denies new onset weakness, numbness to right lower extremity.   Skin: Denies rash, bruising        Physical Exam     Vitals:    12/11/21 1304   BP: 121/63   Pulse: 72   Resp: 16   Temp: 98.2 °F (36.8 °C)   SpO2: 99%   Weight: 54.4 kg (120 lb)   Height: 4' 10\" (1.473 m)       Gen: alert and oriented, in no acute distress. HEENT: Normocephalic, sclera anicteric  Cardiovascular: Normal rate, regular rhythm, no murmurs, rubs, gallops. Pulses intact and equal distally. Pulmonary: No respiratory distress. No stridor. Clear lungs. ABD: Soft, nontender, nondistended. Neuro: Full strength and sensation throughout the bilateral lower extremities. Patellar reflexes 2+. Alert. Normal speech. Normal mentation. Negative straight leg raise test on right. Psych: Normal thought content and thought processes. : No CVA tenderness. EXT: Normal bulk and tone. Moves all extremities well. No cyanosis or clubbing. Straight leg raise negative. Skin: Warm and well-perfused. Back: No midline tenderness of the lumbar spine. Mild tenderness of right paraspinal musculature adjacent to L4-5. Diagnostic Study Results     Labs -     Recent Results (from the past 12 hour(s))   URINALYSIS W/ RFLX MICROSCOPIC    Collection Time: 12/11/21  1:41 PM   Result Value Ref Range    Color YELLOW      Appearance CLEAR      Specific gravity 1.020 1.005 - 1.030      pH (UA) 6.0 5.0 - 8.0      Protein Negative NEG mg/dL    Glucose Negative NEG mg/dL    Ketone Negative NEG mg/dL    Bilirubin Negative NEG      Blood Negative NEG      Urobilinogen 1.0 0.2 - 1.0 EU/dL    Nitrites Negative NEG      Leukocyte Esterase MODERATE (A) NEG     URINE MICROSCOPIC ONLY    Collection Time: 12/11/21  1:41 PM   Result Value Ref Range    WBC 11 to 20 0 - 5 /hpf    RBC 0 to 3 0 - 5 /hpf    Epithelial cells 2+ 0 - 5 /lpf    Bacteria FEW (A) NEG /hpf       Radiologic Studies -   XR SPINE LUMB 2 OR 3 V   Final Result      S-shaped scoliosis of the thoracolumbar spine. Multilevel degenerative disc   disease. No acute fractures. Grade 1 Anterolisthesis at L3-L4. No hardware   complication or loosening.         CT Results  (Last 48 hours) None        CXR Results  (Last 48 hours)    None            Medical Decision Making   I am the first provider for this patient. I reviewed the vital signs, available nursing notes, past medical history, past surgical history, family history and social history. Vital Signs-Reviewed the patient's vital signs. Records Reviewed: Personally, on initial evaluation. Unable to locate documents related to orthopedic spine surgery. MDM:   Patient's clinical condition most consistent with probable acute right-sided para spinal lumbar strain without radiculopathy, sciatica. Other DDX considered but deemed less likely based on history, exam and any objective studies obtained includes: Ureterolithiasis, pyelonephritis, Epidural hematoma, epidural abscess, acute fracture/dislocation of the spine, spinal cord compression. Treatment Plan:   Pain control  with over-the-counter medications, local tissue manipulation, ice pack to start, can alternate with heat, stretching  -Also prescribed her Robaxin as needed for muscle spasms as this seemed to help with her symptoms while in the ED      As per orders noted below:  Orders Placed This Encounter    XR SPINE LUMB 2 OR 3 V    URINALYSIS W/ RFLX MICROSCOPIC    URINE MICROSCOPIC ONLY    acetaminophen (TYLENOL) tablet 1,000 mg    methocarbamoL (ROBAXIN) tablet 500 mg    methocarbamoL (Robaxin) 500 mg tablet        ED Course:    Oral Tylenol and Robaxin, lumbar x-ray. Pain level improving with these medications. Patient condition at time of disposition: Improved    DISCHARGE NOTE:   Pt has been reexamined. Patient has no new complaints, changes, or physical findings. Care plan outlined and precautions discussed. Results were reviewed with the patient. All medications were reviewed with the patient; will d/c home with , daughter. All of pt's questions and concerns were addressed.   Alarm symptoms and return precautions associated with chief complaint and evaluation were reviewed with the patient in detail. The patient demonstrated adequate understanding. Patient was instructed and agrees to follow up with primary care provider, as well as to return to the ED upon further deterioration. Patient is ready to go home. Follow-up Information     Follow up With Specialties Details Why Contact Info    Your local primary care provider  In 1 week For follow-up on your progress, consideration of physical therapy referral if not improving     [facility] EMERGENCY DEPT Emergency Medicine  If symptoms worsen, severe intractable pain not responding to any over-the-counter medications and muscle relaxant, persistent new onset numbness, weakness of legs, groin numbness or tingling, urinary incontinence [address] [place] [id number]  [phone]          Current Discharge Medication List      START taking these medications    Details   methocarbamoL (Robaxin) 500 mg tablet Take 1 Tablet by mouth four (4) times daily as needed for Muscle Spasm(s). Qty: 24 Tablet, Refills: 0  Start date: 12/11/2021             Procedures:  Procedures      Diagnosis     Clinical Impression:   1. Lumbar strain, initial encounter        Signed,  Louis Schuster D.O. Emergency Physician  [place]    As a voice dictation software was utilized to dictate this note, minor word transpositions can occur. I apologize for confusing wording and typographic errors. Please feel free to contact me for clarification.

## 2021-12-11 NOTE — DISCHARGE INSTRUCTIONS
Try stretching, gentle massage to painful area, try ice pack initially for 20 minutes at a time, if symptoms worsening with ice, can try warm compress.   Can also try icy hot or Tigers balm for pain relief in addition to oral medications such as ibuprofen 400 to 600 mg every 6 hours, supplement with Tylenol 500 mg every 4 hours as needed

## 2022-02-04 NOTE — ROUTINE PROCESS
Bedside and Verbal shift change report given to [address] [place] [address] (oncoming nurse) by Qamar Lan RN (offgoing nurse). Report included the following information SBAR, Kardex, MAR and Recent Results. Eucrisa Counseling: Patient may experience a mild burning sensation during topical application. Eucrisa is not approved in children less than 2 years of age.

## 2022-03-19 PROBLEM — M41.9 SCOLIOSIS OF THORACOLUMBAR SPINE: Status: ACTIVE | Noted: 2018-03-29

## 2022-03-19 PROBLEM — R65.10 SIRS (SYSTEMIC INFLAMMATORY RESPONSE SYNDROME) (HCC): Status: ACTIVE | Noted: 2018-04-05

## 2022-03-19 PROBLEM — M51.36 DDD (DEGENERATIVE DISC DISEASE), LUMBAR: Status: ACTIVE | Noted: 2018-03-26

## 2022-03-19 PROBLEM — M43.16 SPONDYLOLISTHESIS OF LUMBAR REGION: Status: ACTIVE | Noted: 2018-03-29

## 2022-03-19 PROBLEM — Z98.890 HISTORY OF BACK SURGERY: Status: ACTIVE | Noted: 2018-04-05

## 2022-03-20 PROBLEM — I48.91 NEW ONSET A-FIB (HCC): Status: ACTIVE | Noted: 2018-04-06

## 2022-03-20 PROBLEM — J98.11 ATELECTASIS: Status: ACTIVE | Noted: 2018-04-06

## 2022-03-27 ENCOUNTER — APPOINTMENT (OUTPATIENT)
Dept: GENERAL RADIOLOGY | Age: 77
End: 2022-03-27
Attending: PHYSICIAN ASSISTANT
Payer: MEDICARE

## 2022-03-27 ENCOUNTER — HOSPITAL ENCOUNTER (EMERGENCY)
Age: 77
Discharge: HOME | End: 2022-03-27
Attending: EMERGENCY MEDICINE
Payer: MEDICARE

## 2022-03-27 VITALS
BODY MASS INDEX: 24.19 KG/M2 | SYSTOLIC BLOOD PRESSURE: 137 MMHG | OXYGEN SATURATION: 100 % | WEIGHT: 120 LBS | HEIGHT: 59 IN | DIASTOLIC BLOOD PRESSURE: 62 MMHG | HEART RATE: 67 BPM | TEMPERATURE: 97.3 F | RESPIRATION RATE: 15 BRPM

## 2022-03-27 DIAGNOSIS — M54.50 ACUTE MIDLINE LOW BACK PAIN WITHOUT SCIATICA: Primary | ICD-10-CM

## 2022-03-27 PROCEDURE — 72110 X-RAY EXAM L-2 SPINE 4/>VWS: CPT

## 2022-03-27 PROCEDURE — 99283 EMERGENCY DEPT VISIT LOW MDM: CPT

## 2022-03-27 RX ORDER — METHOCARBAMOL 500 MG/1
500 TABLET, FILM COATED ORAL 3 TIMES DAILY
Qty: 15 TABLET | Refills: 0 | Status: SHIPPED | OUTPATIENT
Start: 2022-03-27 | End: 2022-04-01

## 2022-03-27 NOTE — ED TRIAGE NOTES
Pt presents for chronic lower back pain. Denies leg involvement, urinary sx, numbness/tingling.  No interventions at home

## 2022-03-27 NOTE — ED PROVIDER NOTES
EMERGENCY DEPARTMENT HISTORY AND PHYSICAL EXAM    Date: 3/27/2022  Patient Name: Jenae Nolan    History of Presenting Illness     Chief Complaint   Patient presents with    Back Pain         History Provided By: Patient and Patient's     11:34 AM  Jenae Nolan is a 65 y.o. female with PMHX of Gita's cerebellar ataxia, GERD who presents to the emergency department C/O low back pain which began 2 days ago. Patient denies any known injury or trauma but states she does have frequent minor falls. She is often in a wheelchair but uses a walker in the home due to her history of cerebellar ataxia. She has history of low back pain for which she had lumbar fusion many years ago by Dr. Myranda Rivas. Patient states she does not have pain in her lower back often. Pt denies saddle anesthesia, bowel or bladder incontinence, extremity pain numbness or weakness, upper back pain, abdominal pain, and any other sxs or complaints. PCP: Melvin Pérez MD    Current Outpatient Medications   Medication Sig Dispense Refill    methocarbamoL (Robaxin) 500 mg tablet Take 1 Tablet by mouth three (3) times daily for 5 days. 15 Tablet 0    famotidine (PEPCID) 20 mg tablet Take 1 Tab by mouth two (2) times daily as needed for Pain or Nausea for up to 30 doses. 30 Tab 0    polyethylene glycol (MIRALAX) 17 gram/dose powder Take 17 g by mouth daily. 1 tablespoon with 8 oz of water daily 300 g 0    metoprolol tartrate (LOPRESSOR) 25 mg tablet Take 1 Tab by mouth every twelve (12) hours. 60 Tab 0    gabapentin (NEURONTIN) 100 mg capsule Take 400 mg by mouth nightly.  CALCIUM CARBONATE/VITAMIN D3 (CALCIUM 500 + D PO) Take 1 Tab by mouth two (2) times a day.  MULTIVIT-MIN/IRON/FOLIC/LUTEIN (CENTRUM SILVER WOMEN PO) Take  by mouth daily.  ACETAMINOPHEN/DIPHENHYDRAMINE (TYLENOL PM PO) Take 2 Tabs by mouth nightly.  PARoxetine (PAXIL) 20 mg tablet Take 20 mg by mouth daily.         ascorbic acid (VITAMIN C) 500 mg tablet Take 500 mg by mouth daily.  cholecalciferol (VITAMIN D3) 1,000 unit tablet Take 1,000 Units by mouth daily.  omega-3 fatty acids-vitamin e (FISH OIL) 1,000 mg Cap Take 1 Cap by mouth daily. Past History     Past Medical History:  Past Medical History:   Diagnosis Date    Arthritis     Chronic pain     lower back    GERD (gastroesophageal reflux disease)     Ill-defined condition     Gita cerebella ataxia    Menopause     Age 47    Other ill-defined conditions(799.89)     Gita's Cerebellar Ataxia    Other ill-defined conditions(799.89)     Pessary    Psychiatric disorder     depression       Past Surgical History:  Past Surgical History:   Procedure Laterality Date    HX CERVICAL FUSION  2012    anterior    HX GI      prolapse rectum    HX HEENT      OS muscle surgery    HX ORTHOPAEDIC      Right knee ORIF    HX ORTHOPAEDIC  2014    right total hip    HX OTHER SURGICAL      repair rectal prolapse       Family History:  Family History   Problem Relation Age of Onset    Breast Cancer Maternal Aunt        Social History:  Social History     Tobacco Use    Smoking status: Never Smoker    Smokeless tobacco: Never Used   Substance Use Topics    Alcohol use: Yes     Alcohol/week: 0.8 standard drinks     Types: 1 Glasses of wine per week    Drug use: No       Allergies:  No Known Allergies      Review of Systems   Review of Systems   Gastrointestinal: Negative for abdominal pain. Musculoskeletal: Positive for back pain. Skin: Negative. Neurological: Negative for weakness and numbness. All other systems reviewed and are negative. Physical Exam     Vitals:    03/27/22 1007   BP: 137/62   Pulse: 67   Resp: 15   Temp: 97.3 °F (36.3 °C)   SpO2: 100%   Weight: 54.4 kg (120 lb)   Height: 4' 11\" (1.499 m)     Physical Exam  Vital signs and nursing notes reviewed. CONSTITUTIONAL: Alert.  Well-appearing; thin female with severe scoliosis; in no apparent distress. HEAD: Normocephalic; atraumatic. CV: Normal S1, S2; no murmurs, rubs, or gallops. No chest wall tenderness. RESPIRATORY: Normal chest excursion with respiration; breath sounds clear and equal bilaterally; no wheezes, rhonchi, or rales. GI: Non-distended; non-tender. BACK: +scoliosis. No evidence of trauma or deformity. Points to right lower SI area for pain, mildly TTP without swelling, erythema, ecchymosis or deformity. .   EXT: Normal ROM in all four extremities; non-tender to palpation. BLE: Sensation intact, motor 5/5, 2+ DP pulses. SKIN: Normal for age and race; warm; dry; good turgor; no apparent lesions or exudate. NEURO: A & O x3. PSYCH:  Mood and affect appropriate. Diagnostic Study Results     Labs -   No results found for this or any previous visit (from the past 12 hour(s)). Radiologic Studies -   XR SPINE LUMB MIN 4 V   Final Result      1. Lumbar scoliosis with multilevel degenerative disc disease and facet   arthropathy. Prior L3-S1 posterior decompression and fusion. Hardware intact. 2. Chronic L1 and T11 compression fractures which are similar to the prior exam.   No new compression fracture identified. CT Results  (Last 48 hours)    None        CXR Results  (Last 48 hours)    None          Medications given in the ED-  Medications - No data to display      Medical Decision Making   I am the first provider for this patient. I reviewed the vital signs, available nursing notes, past medical history, past surgical history, family history and social history. Vital Signs-Reviewed the patient's vital signs. Records Reviewed: Nursing Notes      Procedures:  Procedures    ED Course:  11:34 AM   Initial assessment performed. The patients presenting problems have been discussed, and they are in agreement with the care plan formulated and outlined with them. I have encouraged them to ask questions as they arise throughout their visit. Provider Notes (Medical Decision Making): Anthony Scales is a 65 y.o. female with history of low back pain and prior lumbar fusion presents with low back pain x2 days. She has had some minor falls but denies any definite injury. She is neurovascular intact, tenderness to the right SI area without deformity, positive chronic scoliosis. No symptoms of radiculopathy or cauda equina. X-ray of the lumbar spine shows no degenerative changes, hardware appears intact. She is already on Celebrex and Tylenol for pain, will add Robaxin as she has had relief with muscle relaxer in the past.  Aware of potential sedating side effects and follow-up with her orthopedist.    Diagnosis and Disposition       DISCHARGE NOTE:    Claudia Villaseñor's  results have been reviewed with her. She has been counseled regarding her diagnosis, treatment, and plan. She verbally conveys understanding and agreement of the signs, symptoms, diagnosis, treatment and prognosis and additionally agrees to follow up as discussed. She also agrees with the care-plan and conveys that all of her questions have been answered. I have also provided discharge instructions for her that include: educational information regarding their diagnosis and treatment, and list of reasons why they would want to return to the ED prior to their follow-up appointment, should her condition change. She has been provided with education for proper emergency department utilization. CLINICAL IMPRESSION:    1. Acute midline low back pain without sciatica        PLAN:  1. D/C Home  2. Discharge Medication List as of 3/27/2022 11:27 AM      CONTINUE these medications which have CHANGED    Details   methocarbamoL (Robaxin) 500 mg tablet Take 1 Tablet by mouth three (3) times daily for 5 days. , Normal, Disp-15 Tablet, R-0         CONTINUE these medications which have NOT CHANGED    Details   famotidine (PEPCID) 20 mg tablet Take 1 Tab by mouth two (2) times daily as needed for Pain or Nausea for up to 30 doses. , Print, Disp-30 Tab, R-0      polyethylene glycol (MIRALAX) 17 gram/dose powder Take 17 g by mouth daily. 1 tablespoon with 8 oz of water daily, Print, Disp-300 g, R-0      metoprolol tartrate (LOPRESSOR) 25 mg tablet Take 1 Tab by mouth every twelve (12) hours. , No Print, Disp-60 Tab, R-0      gabapentin (NEURONTIN) 100 mg capsule Take 400 mg by mouth nightly., Historical Med      CALCIUM CARBONATE/VITAMIN D3 (CALCIUM 500 + D PO) Take 1 Tab by mouth two (2) times a day., Historical Med      MULTIVIT-MIN/IRON/FOLIC/LUTEIN (CENTRUM SILVER WOMEN PO) Take  by mouth daily. , Historical Med      ACETAMINOPHEN/DIPHENHYDRAMINE (TYLENOL PM PO) Take 2 Tabs by mouth nightly., Historical Med      PARoxetine (PAXIL) 20 mg tablet Take 20 mg by mouth daily. , Historical Med      ascorbic acid (VITAMIN C) 500 mg tablet Take 500 mg by mouth daily. , Historical Med      cholecalciferol (VITAMIN D3) 1,000 unit tablet Take 1,000 Units by mouth daily. , Historical Med      omega-3 fatty acids-vitamin e (FISH OIL) 1,000 mg Cap Take 1 Cap by mouth daily. , Historical Med           3. Follow-up Information     Follow up With Specialties Details Why Contact Info    Zuleyma De La Cruz MD Internal Medicine Schedule an appointment as soon as possible for a visit   [address]  [phone]      Flavio Mathews MD Orthopedic Surgery Schedule an appointment as soon as possible for a visit   [address] [facility]      [facility] EMERGENCY DEPT Emergency Medicine  As needed, If symptoms worsen [address] [address]  [phone]        _______________________________      Please note that this dictation was completed with [employer], the [employer] voice recognition software.   Quite often unanticipated grammatical, syntax, homophones, and other interpretive errors are inadvertently transcribed by the computer software. Please disregard these errors. Please excuse any errors that have escaped final proofreading.

## 2022-08-10 ENCOUNTER — HOSPITAL ENCOUNTER (EMERGENCY)
Age: 77
Discharge: HOME | End: 2022-08-10
Attending: EMERGENCY MEDICINE
Payer: MEDICARE

## 2022-08-10 VITALS
DIASTOLIC BLOOD PRESSURE: 61 MMHG | SYSTOLIC BLOOD PRESSURE: 138 MMHG | HEART RATE: 74 BPM | TEMPERATURE: 97.8 F | BODY MASS INDEX: 23.56 KG/M2 | RESPIRATION RATE: 15 BRPM | WEIGHT: 120 LBS | HEIGHT: 60 IN | OXYGEN SATURATION: 100 %

## 2022-08-10 DIAGNOSIS — N30.00 ACUTE CYSTITIS WITHOUT HEMATURIA: Primary | ICD-10-CM

## 2022-08-10 LAB
ALBUMIN SERPL-MCNC: 3.9 G/DL (ref 3.4–5)
ALBUMIN/GLOB SERPL: 1.2 {RATIO} (ref 0.8–1.7)
ALP SERPL-CCNC: 69 U/L (ref 45–117)
ALT SERPL-CCNC: 20 U/L (ref 13–56)
AMORPH CRY URNS QL MICRO: ABNORMAL
ANION GAP SERPL CALC-SCNC: 5 MMOL/L (ref 3–18)
APPEARANCE UR: ABNORMAL
AST SERPL-CCNC: 14 U/L (ref 10–38)
BACTERIA URNS QL MICRO: ABNORMAL /HPF
BASOPHILS # BLD: 0 K/UL (ref 0–0.1)
BASOPHILS NFR BLD: 1 % (ref 0–2)
BILIRUB SERPL-MCNC: 0.6 MG/DL (ref 0.2–1)
BILIRUB UR QL: NEGATIVE
BUN SERPL-MCNC: 18 MG/DL (ref 7–18)
BUN/CREAT SERPL: 28 (ref 12–20)
CALCIUM SERPL-MCNC: 9.9 MG/DL (ref 8.5–10.1)
CHLORIDE SERPL-SCNC: 106 MMOL/L (ref 100–111)
CO2 SERPL-SCNC: 28 MMOL/L (ref 21–32)
COLOR UR: YELLOW
CREAT SERPL-MCNC: 0.65 MG/DL (ref 0.6–1.3)
DIFFERENTIAL METHOD BLD: ABNORMAL
EOSINOPHIL # BLD: 0.1 K/UL (ref 0–0.4)
EOSINOPHIL NFR BLD: 1 % (ref 0–5)
EPITH CASTS URNS QL MICRO: ABNORMAL /LPF (ref 0–5)
ERYTHROCYTE [DISTWIDTH] IN BLOOD BY AUTOMATED COUNT: 12.5 % (ref 11.6–14.5)
GLOBULIN SER CALC-MCNC: 3.2 G/DL (ref 2–4)
GLUCOSE SERPL-MCNC: 107 MG/DL (ref 74–99)
GLUCOSE UR STRIP.AUTO-MCNC: NEGATIVE MG/DL
HCT VFR BLD AUTO: 37.6 % (ref 35–45)
HGB BLD-MCNC: 12.1 G/DL (ref 12–16)
HGB UR QL STRIP: NEGATIVE
IMM GRANULOCYTES # BLD AUTO: 0 K/UL (ref 0–0.04)
IMM GRANULOCYTES NFR BLD AUTO: 1 % (ref 0–0.5)
KETONES UR QL STRIP.AUTO: 15 MG/DL
LACTATE BLD-SCNC: 0.64 MMOL/L (ref 0.4–2)
LEUKOCYTE ESTERASE UR QL STRIP.AUTO: ABNORMAL
LYMPHOCYTES # BLD: 1.3 K/UL (ref 0.9–3.6)
LYMPHOCYTES NFR BLD: 20 % (ref 21–52)
MCH RBC QN AUTO: 32.7 PG (ref 24–34)
MCHC RBC AUTO-ENTMCNC: 32.2 G/DL (ref 31–37)
MCV RBC AUTO: 101.6 FL (ref 78–100)
MONOCYTES # BLD: 0.7 K/UL (ref 0.05–1.2)
MONOCYTES NFR BLD: 11 % (ref 3–10)
NEUTS SEG # BLD: 4.3 K/UL (ref 1.8–8)
NEUTS SEG NFR BLD: 66 % (ref 40–73)
NITRITE UR QL STRIP.AUTO: NEGATIVE
NRBC # BLD: 0 K/UL (ref 0–0.01)
NRBC BLD-RTO: 0 PER 100 WBC
PH UR STRIP: 8.5 [PH] (ref 5–8)
PLATELET # BLD AUTO: 277 K/UL (ref 135–420)
PMV BLD AUTO: 10.1 FL (ref 9.2–11.8)
POTASSIUM SERPL-SCNC: 4.1 MMOL/L (ref 3.5–5.5)
PROT SERPL-MCNC: 7.1 G/DL (ref 6.4–8.2)
PROT UR STRIP-MCNC: NEGATIVE MG/DL
RBC # BLD AUTO: 3.7 M/UL (ref 4.2–5.3)
RBC #/AREA URNS HPF: ABNORMAL /HPF (ref 0–5)
SODIUM SERPL-SCNC: 139 MMOL/L (ref 136–145)
SP GR UR REFRACTOMETRY: 1.02 (ref 1–1.03)
UROBILINOGEN UR QL STRIP.AUTO: 0.2 EU/DL (ref 0.2–1)
WBC # BLD AUTO: 6.4 K/UL (ref 4.6–13.2)
WBC URNS QL MICRO: ABNORMAL /HPF (ref 0–5)

## 2022-08-10 PROCEDURE — 81001 URINALYSIS AUTO W/SCOPE: CPT

## 2022-08-10 PROCEDURE — 80053 COMPREHEN METABOLIC PANEL: CPT

## 2022-08-10 PROCEDURE — 99283 EMERGENCY DEPT VISIT LOW MDM: CPT

## 2022-08-10 PROCEDURE — 87040 BLOOD CULTURE FOR BACTERIA: CPT

## 2022-08-10 PROCEDURE — 87086 URINE CULTURE/COLONY COUNT: CPT

## 2022-08-10 PROCEDURE — 83605 ASSAY OF LACTIC ACID: CPT

## 2022-08-10 PROCEDURE — 85025 COMPLETE CBC W/AUTO DIFF WBC: CPT

## 2022-08-10 NOTE — ED PROVIDER NOTES
EMERGENCY DEPARTMENT HISTORY AND PHYSICAL EXAM    Date: 8/10/2022  Patient Name: Cathie Castillo    History of Presenting Illness     Chief Complaint   Patient presents with    Urinary Frequency    Chills       History Provided By: Patient, Patient's , and Patient's Daughter     History Mary Ann Shaina):   6:37 PM  Cathie Castillo is a 65 y.o. female with a PMHX of Gita's cerebellar ataxia  who presents to the emergency department (room 13) C/O urinary frequency onset yesterday. Associated sxs include fevers, chills. Pt denies dysuria or any other sxs or complaints. Patient was seen at urgent care yesterday and started on a third-generation cephalosporin orally for UTI. She has taken 2 doses of this. Patient states that today she has had fever and chills and shaking. She feels like the medication is not working. Chief Complaint: Urinary frequency  Onset: Yesterday  Timing:  Subacute  Context: Symptoms started spontaneously, symptoms have not improved since onset  Location: Generalized  Quality:  Painless  Severity: Moderate  Modifying Factors: Nothing makes it better, or worse.   Associated Symptoms:  Fever, chills, shaking    PCP: Johnny Strong MD     Past History         Past Medical History:  Past Medical History:   Diagnosis Date    Arthritis     Chronic pain     lower back    GERD (gastroesophageal reflux disease)     Ill-defined condition     Gita cerebella ataxia    Menopause     Age 47    Other ill-defined conditions(799.89)     Gita's Cerebellar Ataxia    Other ill-defined conditions(799.89)     Pessary    Psychiatric disorder     depression       Past Surgical History:  Past Surgical History:   Procedure Laterality Date    HX CERVICAL FUSION  2012    anterior    HX GI      prolapse rectum    HX HEENT      OS muscle surgery    HX ORTHOPAEDIC      Right knee ORIF    HX ORTHOPAEDIC  2014    right total hip    HX OTHER SURGICAL      repair rectal prolapse       Family History:  Family History Problem Relation Age of Onset    Breast Cancer Maternal Aunt    Reviewed and non-contributory    Social History:  Social History     Tobacco Use    Smoking status: Never    Smokeless tobacco: Never   Substance Use Topics    Alcohol use: Yes     Alcohol/week: 0.8 standard drinks     Types: 1 Glasses of wine per week    Drug use: No       Medications:  Current Outpatient Medications   Medication Sig Dispense Refill    famotidine (PEPCID) 20 mg tablet Take 1 Tab by mouth two (2) times daily as needed for Pain or Nausea for up to 30 doses. 30 Tab 0    polyethylene glycol (MIRALAX) 17 gram/dose powder Take 17 g by mouth daily. 1 tablespoon with 8 oz of water daily 300 g 0    metoprolol tartrate (LOPRESSOR) 25 mg tablet Take 1 Tab by mouth every twelve (12) hours. 60 Tab 0    gabapentin (NEURONTIN) 100 mg capsule Take 400 mg by mouth nightly. CALCIUM CARBONATE/VITAMIN D3 (CALCIUM 500 + D PO) Take 1 Tab by mouth two (2) times a day. MULTIVIT-MIN/IRON/FOLIC/LUTEIN (CENTRUM SILVER WOMEN PO) Take  by mouth daily. ACETAMINOPHEN/DIPHENHYDRAMINE (TYLENOL PM PO) Take 2 Tabs by mouth nightly. PARoxetine (PAXIL) 20 mg tablet Take 20 mg by mouth daily. ascorbic acid (VITAMIN C) 500 mg tablet Take 500 mg by mouth daily. cholecalciferol (VITAMIN D3) 1,000 unit tablet Take 1,000 Units by mouth daily. omega-3 fatty acids-vitamin e (FISH OIL) 1,000 mg Cap Take 1 Cap by mouth daily. Allergies:  No Known Allergies    Review of Systems      Review of Systems   Constitutional:  Positive for chills and fever. HENT:  Negative for congestion, rhinorrhea and sore throat. Eyes:  Negative for pain and visual disturbance. Respiratory:  Negative for cough, shortness of breath and wheezing. Cardiovascular:  Negative for chest pain and palpitations. Gastrointestinal:  Negative for abdominal pain, diarrhea and vomiting.    Genitourinary:  Negative for dysuria, flank pain, frequency and urgency. Musculoskeletal:  Negative for arthralgias and myalgias. Skin:  Negative for rash and wound. Neurological:  Negative for speech difficulty, weakness, light-headedness and headaches. Psychiatric/Behavioral:  Negative for agitation and confusion. All other systems reviewed and are negative. Physical Exam     Vitals:    08/10/22 1823   BP: 138/61   Pulse: 74   Resp: 15   Temp: 97.8 °F (36.6 °C)   SpO2: 100%   Weight: 54.4 kg (120 lb)   Height: 5' (1.524 m)       Physical Exam  Vitals and nursing note reviewed. Constitutional:       General: She is not in acute distress. Appearance: Normal appearance. She is normal weight. She is not ill-appearing. HENT:      Head: Normocephalic and atraumatic. Nose: Nose normal. No rhinorrhea. Mouth/Throat:      Mouth: Mucous membranes are moist.      Pharynx: No oropharyngeal exudate or posterior oropharyngeal erythema. Eyes:      Extraocular Movements: Extraocular movements intact. Conjunctiva/sclera: Conjunctivae normal.      Pupils: Pupils are equal, round, and reactive to light. Cardiovascular:      Rate and Rhythm: Normal rate and regular rhythm. Heart sounds: No murmur heard. No friction rub. No gallop. Pulmonary:      Effort: Pulmonary effort is normal. No respiratory distress. Breath sounds: Normal breath sounds. No wheezing, rhonchi or rales. Abdominal:      General: Bowel sounds are normal.      Palpations: Abdomen is soft. Tenderness: There is no abdominal tenderness. There is no right CVA tenderness, left CVA tenderness, guarding or rebound. Negative signs include Jaffe's sign, Rovsing's sign and McBurney's sign. Musculoskeletal:         General: No swelling, tenderness or deformity. Normal range of motion. Cervical back: Normal range of motion and neck supple. No rigidity. Lymphadenopathy:      Cervical: No cervical adenopathy. Skin:     General: Skin is warm and dry.       Findings: No rash.   Neurological:      General: No focal deficit present. Mental Status: She is alert and oriented to person, place, and time. Psychiatric:         Mood and Affect: Mood normal.         Behavior: Behavior normal.       Diagnostic Study Results     Labs -  Recent Results (from the past 12 hour(s))   URINALYSIS W/ RFLX MICROSCOPIC    Collection Time: 08/10/22  6:59 PM   Result Value Ref Range    Color YELLOW      Appearance TURBID      Specific gravity 1.020 1.005 - 1.030      pH (UA) 8.5 (H) 5.0 - 8.0      Protein Negative NEG mg/dL    Glucose Negative NEG mg/dL    Ketone 15 (A) NEG mg/dL    Bilirubin Negative NEG      Blood Negative NEG      Urobilinogen 0.2 0.2 - 1.0 EU/dL    Nitrites Negative NEG      Leukocyte Esterase SMALL (A) NEG     URINE MICROSCOPIC ONLY    Collection Time: 08/10/22  6:59 PM   Result Value Ref Range    WBC 2 to 5 0 - 5 /hpf    RBC 0 to 3 0 - 5 /hpf    Epithelial cells FEW 0 - 5 /lpf    Bacteria 1+ (A) NEG /hpf    Amorphous Crystals 3+ (A) NEG   METABOLIC PANEL, COMPREHENSIVE    Collection Time: 08/10/22  8:00 PM   Result Value Ref Range    Sodium 139 136 - 145 mmol/L    Potassium 4.1 3.5 - 5.5 mmol/L    Chloride 106 100 - 111 mmol/L    CO2 28 21 - 32 mmol/L    Anion gap 5 3.0 - 18 mmol/L    Glucose 107 (H) 74 - 99 mg/dL    BUN 18 7.0 - 18 MG/DL    Creatinine 0.65 0.6 - 1.3 MG/DL    BUN/Creatinine ratio 28 (H) 12 - 20      GFR est AA >60 >60 ml/min/1.73m2    GFR est non-AA >60 >60 ml/min/1.73m2    Calcium 9.9 8.5 - 10.1 MG/DL    Bilirubin, total 0.6 0.2 - 1.0 MG/DL    ALT (SGPT) 20 13 - 56 U/L    AST (SGOT) 14 10 - 38 U/L    Alk.  phosphatase 69 45 - 117 U/L    Protein, total 7.1 6.4 - 8.2 g/dL    Albumin 3.9 3.4 - 5.0 g/dL    Globulin 3.2 2.0 - 4.0 g/dL    A-G Ratio 1.2 0.8 - 1.7     CBC WITH AUTOMATED DIFF    Collection Time: 08/10/22  8:00 PM   Result Value Ref Range    WBC 6.4 4.6 - 13.2 K/uL    RBC 3.70 (L) 4.20 - 5.30 M/uL    HGB 12.1 12.0 - 16.0 g/dL    HCT 37.6 35.0 - 45.0 % .6 (H) 78.0 - 100.0 FL    MCH 32.7 24.0 - 34.0 PG    MCHC 32.2 31.0 - 37.0 g/dL    RDW 12.5 11.6 - 14.5 %    PLATELET 022 027 - 388 K/uL    MPV 10.1 9.2 - 11.8 FL    NRBC 0.0 0  WBC    ABSOLUTE NRBC 0.00 0.00 - 0.01 K/uL    NEUTROPHILS 66 40 - 73 %    LYMPHOCYTES 20 (L) 21 - 52 %    MONOCYTES 11 (H) 3 - 10 %    EOSINOPHILS 1 0 - 5 %    BASOPHILS 1 0 - 2 %    IMMATURE GRANULOCYTES 1 (H) 0.0 - 0.5 %    ABS. NEUTROPHILS 4.3 1.8 - 8.0 K/UL    ABS. LYMPHOCYTES 1.3 0.9 - 3.6 K/UL    ABS. MONOCYTES 0.7 0.05 - 1.2 K/UL    ABS. EOSINOPHILS 0.1 0.0 - 0.4 K/UL    ABS. BASOPHILS 0.0 0.0 - 0.1 K/UL    ABS. IMM. GRANS. 0.0 0.00 - 0.04 K/UL    DF AUTOMATED     POC LACTIC ACID    Collection Time: 08/10/22  8:04 PM   Result Value Ref Range    Lactic Acid (POC) 0.64 0.40 - 2.00 mmol/L       Radiologic Studies -   No orders to display     CT Results  (Last 48 hours)      None          CXR Results  (Last 48 hours)      None            Medications given in the ED-  Medications - No data to display    Procedures     Procedures    ED Course     I Natividad Russell MD) am the first provider for this patient. I reviewed the vital signs, available nursing notes, past medical history, past surgical history, family history and social history. Records Reviewed: Nursing Notes    Cardiac Monitor:  Rate: 74 bpm  Rhythm: sinus rhythm    Pulse Oximetry Analysis - 100% on RA    6:37 PM Initial assessment performed. The patients presenting problems have been discussed, and they are in agreement with the care plan formulated and outlined with them. I have encouraged them to ask questions as they arise throughout their visit. Medical Decision Making     Provider Notes (Medical Decision Making):   DDX: UTI, bacteremia, sepsis    Discussion:  65 y.o. female with subacute UTI starting yesterday. Patient was placed on cefdinir orally at urgent care. She is only taken 2 doses this when she presents to the ED today.   She is not febrile in the ED. She does not have any elevation in her white blood cell count she has no bandemia. Urine still demonstrates signs of UTI. We will culture the urine. Patient instructed to follow-up with her primary care doctor or return to the ED after 5 full doses of the medication if symptoms are still persisting. Patient will take Tylenol or ibuprofen for fevers or pain at home. Patient and family understand agree with this plan. Diagnosis and Disposition     DISCHARGE NOTE:  8:53 PM   Cynthia Villaseñor's  results have been reviewed with her. She has been counseled regarding her diagnosis, treatment, and plan. She verbally conveys understanding and agreement of the signs, symptoms, diagnosis, treatment and prognosis and additionally agrees to follow up as discussed. She also agrees with the care-plan and conveys that all of her questions have been answered. I have also provided discharge instructions for her that include: educational information regarding their diagnosis and treatment, and list of reasons why they would want to return to the ED prior to their follow-up appointment, should her condition change. She has been provided with education for proper emergency department utilization. CLINICAL IMPRESSION:    1. Acute cystitis without hematuria        PLAN:  1. D/C Home  2. Current Discharge Medication List        3. Follow-up Information       Follow up With Specialties Details Why Contact Info    Sean Crockett MD Internal Medicine Physician Schedule an appointment as soon as possible for a visit  As soon as possible, For follow up from Emergency Department visit. [address]      [facility] Emergency Medicine  As needed; If symptoms worsen [address] [place] [phone] Yarely Loza MD am the primary clinician of record.     Shruthi Disclaimer     Please note that this dictation was completed with Dragon, the computer voice recognition software. Quite often unanticipated grammatical, syntax, homophones, and other interpretive errors are inadvertently transcribed by the computer software. Please disregard these errors. Please excuse any errors that have escaped final proofreading.     Charleen Marroquin MD

## 2022-08-11 NOTE — DISCHARGE INSTRUCTIONS
Continue taking your antibiotics as scheduled. You may use Tylenol or ibuprofen for fever or pain. If you are not improving by Friday morning, follow-up with your primary care doctor or return to the ED. Return to the ED immediately for high fevers which are not responding to Tylenol, worsening symptoms, chest pain, shortness of breath, lightheadedness, mental status changes or for other concerns. Somebody from the ED will contact you if your urine culture grows out bacteria that is not susceptible to the antibiotic you are prescribed.

## 2022-08-12 ENCOUNTER — APPOINTMENT (OUTPATIENT)
Dept: MRI IMAGING | Age: 77
End: 2022-08-12
Attending: STUDENT IN AN ORGANIZED HEALTH CARE EDUCATION/TRAINING PROGRAM
Payer: MEDICARE

## 2022-08-12 ENCOUNTER — HOSPITAL ENCOUNTER (EMERGENCY)
Age: 77
Discharge: HOME | End: 2022-08-12
Attending: STUDENT IN AN ORGANIZED HEALTH CARE EDUCATION/TRAINING PROGRAM
Payer: MEDICARE

## 2022-08-12 ENCOUNTER — APPOINTMENT (OUTPATIENT)
Dept: GENERAL RADIOLOGY | Age: 77
End: 2022-08-12
Attending: STUDENT IN AN ORGANIZED HEALTH CARE EDUCATION/TRAINING PROGRAM
Payer: MEDICARE

## 2022-08-12 ENCOUNTER — APPOINTMENT (OUTPATIENT)
Dept: CT IMAGING | Age: 77
End: 2022-08-12
Attending: STUDENT IN AN ORGANIZED HEALTH CARE EDUCATION/TRAINING PROGRAM
Payer: MEDICARE

## 2022-08-12 VITALS
SYSTOLIC BLOOD PRESSURE: 110 MMHG | DIASTOLIC BLOOD PRESSURE: 59 MMHG | HEART RATE: 77 BPM | OXYGEN SATURATION: 100 % | TEMPERATURE: 98.1 F | RESPIRATION RATE: 18 BRPM | WEIGHT: 120 LBS | BODY MASS INDEX: 23.56 KG/M2 | HEIGHT: 60 IN

## 2022-08-12 DIAGNOSIS — R25.1 SHAKINESS: ICD-10-CM

## 2022-08-12 DIAGNOSIS — R68.89 FORGETFULNESS: ICD-10-CM

## 2022-08-12 DIAGNOSIS — R26.81 UNSTEADY GAIT: ICD-10-CM

## 2022-08-12 DIAGNOSIS — G11.8: ICD-10-CM

## 2022-08-12 DIAGNOSIS — G11.2: Primary | ICD-10-CM

## 2022-08-12 LAB
ANION GAP SERPL CALC-SCNC: 4 MMOL/L (ref 3–18)
APPEARANCE UR: CLEAR
BACTERIA SPEC CULT: NORMAL
BACTERIA URNS QL MICRO: ABNORMAL /HPF
BASOPHILS # BLD: 0.1 K/UL (ref 0–0.1)
BASOPHILS NFR BLD: 1 % (ref 0–2)
BILIRUB UR QL: NEGATIVE
BUN SERPL-MCNC: 17 MG/DL (ref 7–18)
BUN/CREAT SERPL: 25 (ref 12–20)
CALCIUM SERPL-MCNC: 9.4 MG/DL (ref 8.5–10.1)
CHLORIDE SERPL-SCNC: 104 MMOL/L (ref 100–111)
CO2 SERPL-SCNC: 29 MMOL/L (ref 21–32)
COLOR UR: YELLOW
CREAT SERPL-MCNC: 0.67 MG/DL (ref 0.6–1.3)
DIFFERENTIAL METHOD BLD: ABNORMAL
EOSINOPHIL # BLD: 0.1 K/UL (ref 0–0.4)
EOSINOPHIL NFR BLD: 1 % (ref 0–5)
EPITH CASTS URNS QL MICRO: ABNORMAL /LPF (ref 0–5)
ERYTHROCYTE [DISTWIDTH] IN BLOOD BY AUTOMATED COUNT: 12.5 % (ref 11.6–14.5)
GLUCOSE SERPL-MCNC: 96 MG/DL (ref 74–99)
GLUCOSE UR STRIP.AUTO-MCNC: NEGATIVE MG/DL
HCT VFR BLD AUTO: 38.5 % (ref 35–45)
HGB BLD-MCNC: 12.6 G/DL (ref 12–16)
HGB UR QL STRIP: NEGATIVE
IMM GRANULOCYTES # BLD AUTO: 0 K/UL (ref 0–0.04)
IMM GRANULOCYTES NFR BLD AUTO: 0 % (ref 0–0.5)
KETONES UR QL STRIP.AUTO: NEGATIVE MG/DL
LEUKOCYTE ESTERASE UR QL STRIP.AUTO: ABNORMAL
LYMPHOCYTES # BLD: 1.2 K/UL (ref 0.9–3.6)
LYMPHOCYTES NFR BLD: 17 % (ref 21–52)
MCH RBC QN AUTO: 33.7 PG (ref 24–34)
MCHC RBC AUTO-ENTMCNC: 32.7 G/DL (ref 31–37)
MCV RBC AUTO: 102.9 FL (ref 78–100)
MONOCYTES # BLD: 0.7 K/UL (ref 0.05–1.2)
MONOCYTES NFR BLD: 10 % (ref 3–10)
NEUTS SEG # BLD: 5 K/UL (ref 1.8–8)
NEUTS SEG NFR BLD: 72 % (ref 40–73)
NITRITE UR QL STRIP.AUTO: NEGATIVE
NRBC # BLD: 0 K/UL (ref 0–0.01)
NRBC BLD-RTO: 0 PER 100 WBC
PH UR STRIP: 6.5 [PH] (ref 5–8)
PLATELET # BLD AUTO: 289 K/UL (ref 135–420)
PMV BLD AUTO: 10.1 FL (ref 9.2–11.8)
POTASSIUM SERPL-SCNC: 4.5 MMOL/L (ref 3.5–5.5)
PROT UR STRIP-MCNC: NEGATIVE MG/DL
RBC # BLD AUTO: 3.74 M/UL (ref 4.2–5.3)
RBC #/AREA URNS HPF: NEGATIVE /HPF (ref 0–5)
SERVICE CMNT-IMP: NORMAL
SODIUM SERPL-SCNC: 137 MMOL/L (ref 136–145)
SP GR UR REFRACTOMETRY: 1.01 (ref 1–1.03)
TSH SERPL DL<=0.05 MIU/L-ACNC: 1.33 UIU/ML (ref 0.36–3.74)
UROBILINOGEN UR QL STRIP.AUTO: 0.2 EU/DL (ref 0.2–1)
WBC # BLD AUTO: 7 K/UL (ref 4.6–13.2)
WBC URNS QL MICRO: ABNORMAL /HPF (ref 0–5)

## 2022-08-12 PROCEDURE — 80048 BASIC METABOLIC PNL TOTAL CA: CPT

## 2022-08-12 PROCEDURE — 85025 COMPLETE CBC W/AUTO DIFF WBC: CPT

## 2022-08-12 PROCEDURE — 84443 ASSAY THYROID STIM HORMONE: CPT

## 2022-08-12 PROCEDURE — 81001 URINALYSIS AUTO W/SCOPE: CPT

## 2022-08-12 PROCEDURE — 74018 RADEX ABDOMEN 1 VIEW: CPT

## 2022-08-12 PROCEDURE — 70450 CT HEAD/BRAIN W/O DYE: CPT

## 2022-08-12 PROCEDURE — 87086 URINE CULTURE/COLONY COUNT: CPT

## 2022-08-12 PROCEDURE — 70551 MRI BRAIN STEM W/O DYE: CPT

## 2022-08-12 PROCEDURE — 99284 EMERGENCY DEPT VISIT MOD MDM: CPT

## 2022-08-12 NOTE — ED PROVIDER NOTES
EMERGENCY DEPARTMENT HISTORY AND PHYSICAL EXAM  [facility] EMERGENCY DEPT        Date: 8/12/2022  Patient Name: Antoinette Holliday    History of Presenting Illness     Chief Complaint   Patient presents with    Urinary Frequency    Chills       History Provided By: Patient, Patient's , and Patient's Daughter    HPI:  Antoinette Holliday is a 65 y.o. female with a history of cerebellar who presents today with chills, increased urinary frequency, some change in recall memory, unsteady gait (chronic), increased shaking. Daughter states that she has been seen twice for UTI but no significant improvement. Symptoms started several days ago. She is taking a broad spectrum abx for UTI. The patient has not been nauseated and denies vomiting. The patient denies flank pain or fevers, does endorses some chills. Bowel movements have been normal for patient. The patient denies melena or BRBPR. The patient denies any aggravating or alleviating factors - and has not taken any other medications in an attempt to alleviate her symptoms. PMH, PSH, family history, social history, allergies reviewed with the patient with significant items noted above. PCP: Marlin Habermann, MD    Current Outpatient Medications   Medication Sig Dispense Refill    famotidine (PEPCID) 20 mg tablet Take 1 Tab by mouth two (2) times daily as needed for Pain or Nausea for up to 30 doses. 30 Tab 0    polyethylene glycol (MIRALAX) 17 gram/dose powder Take 17 g by mouth daily. 1 tablespoon with 8 oz of water daily 300 g 0    metoprolol tartrate (LOPRESSOR) 25 mg tablet Take 1 Tab by mouth every twelve (12) hours. 60 Tab 0    gabapentin (NEURONTIN) 100 mg capsule Take 400 mg by mouth nightly. CALCIUM CARBONATE/VITAMIN D3 (CALCIUM 500 + D PO) Take 1 Tab by mouth two (2) times a day. MULTIVIT-MIN/IRON/FOLIC/LUTEIN (CENTRUM SILVER WOMEN PO) Take  by mouth daily.       ACETAMINOPHEN/DIPHENHYDRAMINE (TYLENOL PM PO) Take 2 Tabs by mouth nightly. PARoxetine (PAXIL) 20 mg tablet Take 20 mg by mouth daily. ascorbic acid (VITAMIN C) 500 mg tablet Take 500 mg by mouth daily. cholecalciferol (VITAMIN D3) 1,000 unit tablet Take 1,000 Units by mouth daily. omega-3 fatty acids-vitamin e (FISH OIL) 1,000 mg Cap Take 1 Cap by mouth daily. Past History     Past Medical History:  Past Medical History:   Diagnosis Date    Arthritis     Chronic pain     lower back    GERD (gastroesophageal reflux disease)     Ill-defined condition     Gita cerebella ataxia    Menopause     Age 47    Other ill-defined conditions(799.89)     Gita's Cerebellar Ataxia    Other ill-defined conditions(799.89)     Pessary    Psychiatric disorder     depression       Past Surgical History:  Past Surgical History:   Procedure Laterality Date    HX CERVICAL FUSION  2012    anterior    HX GI      prolapse rectum    HX HEENT      OS muscle surgery    HX ORTHOPAEDIC      Right knee ORIF    HX ORTHOPAEDIC  2014    right total hip    HX OTHER SURGICAL      repair rectal prolapse       Family History:  Family History   Problem Relation Age of Onset    Breast Cancer Maternal Aunt        Social History:  Social History     Tobacco Use    Smoking status: Never    Smokeless tobacco: Never   Substance Use Topics    Alcohol use:  Yes     Alcohol/week: 0.8 standard drinks     Types: 1 Glasses of wine per week    Drug use: No       Allergies:  No Known Allergies    Review of Systems   Review of Systems    In addition to that documented in the HPI above  All other review of systems negative    Constitutional: Denies fevers or chills  Eyes: Denies vision changes  ENMT: Denies sore throat  CV: Denies chest pain  Resp: Denies SOB  GI: Denies vomiting or diarrhea  : Denies painful urination  MSK: Denies recent trauma  Skin: Denies new rashes  Neuro: Denies new numbness or tingling or weakness  Endocrine: Denies polyuria  Heme: Denies bleeding disorders    Physical Exam Vitals:    08/12/22 1242 08/12/22 1459 08/12/22 1514 08/12/22 1529   BP:  (!) 110/59     Pulse:       Resp:       Temp: 98.1 °F (36.7 °C)      SpO2:  91% 100% 100%   Weight:       Height:         Physical Exam    Nursing notes and vital signs reviewed    General: Patient is awake and alert, resting comfortably in no acute distress  Head: Normocephalic and atraumatic  Eyes: Extraocular muscles intact, no conjunctival pallor  Ear, nose, throat: Normal external exam, trachea midline  Neck: Normal range of motion, no gross motor difficulty  Cardiovascular: RRR, no murmur auscultated, warm, well-perfused extremities  Respiratory: Patient is in no respiratory distress, lungs CTAB  GI: soft, non-tender, non-distended  MSK: No gross deformities appreciated, no lesions  Extremities: pulses intact with good cap refills, no LE pitting edema or calf tenderness  Skin: Warm, dry, and intact  Neuro: The patient is alert and oriented, no gross motor or sensory defects noted. All muscle groups, proximal and distal have equal strength intact. No cranial nerve deficit   Mild dysdiadochokinesia present bilaterally  Heel to shin normal  Psych: Appropriate mood and affect. Medical Decision Making   I am the first provider for this patient. I reviewed the vital signs, available nursing notes, past medical history, past surgical history, family history and social history. Vital Signs-Reviewed the patient's vital signs. Visit Vitals  BP (!) 110/59   Pulse 77   Temp 98.1 °F (36.7 °C)   Resp 18   Ht 5' (1.524 m)   Wt 54.4 kg (120 lb)   SpO2 100%   BMI 23.44 kg/m²       Pulse Oximetry Analysis - 100% on room air     Cardiac Monitor:  Rate: 77 bpm  Rhythm: nsr    Provider Notes (Medical Decision Making):   65 y.o., female, presents to the Emergency Department with chills, shaking, concern for UTI    Most likely UTI initially thought, although culture negative  Possible worsening of chronic disease or new neuro insult.  Also considered other causes of peripheral neuropathy. Will obtain CT head      Procedures:  Procedures    ED Course:   ED Course as of 08/17/22 1957   Fri Aug 12, 2022   1500 No acute intracranial abnormalities. Chronic disproportionate cerebellar  atrophy. [DM]   12 Dr. Sheldon Meneses agrees, appropriate order of MRI [DM]   1240 IMPRESSION     1. Profound cerebellar atrophy which is out of proportion to the degree of  cerebral atrophy. This can be seen in the setting of long-term anticonvulsant  therapy (Dilantin), related to long-standing ethanol abuse, and/or related to a  neurodegenerative process. 2.  Mild cerebral atrophy and chronic small vessel changes. 3.  No acute intracranial abnormalities are identified. [DM]      ED Course User Index  [DM] Vika Romero MD        Uncertain of etiology of patients symptoms. Negative workup for acute neuro pathology  No UTI  Advised to continue abx. Follow up with neurology and pcp  No acute pathology necessitating further emergent workup or hospital admission is suspected or found. Will discharge home with meds as prescribed previously . She is comfortable with the plan and discharge at this time. Expressed the importance of follow up for current symptoms and she agrees and was advised on what signs/symptoms to return immediately to the ER.            Vitals Review/addressed -     Diagnostic Study Results     Orders Placed This Encounter    CULTURE, URINE     Standing Status:   Standing     Number of Occurrences:   1     Order Specific Question:   Reason for Culture     Answer:   Flank discomfort    CT HEAD WO CONT     Standing Status:   Standing     Number of Occurrences:   1     Order Specific Question:   Transport     Answer:   BED [2]     Order Specific Question:   Reason for Exam     Answer:   ams     Order Specific Question:   Decision Support Exception     Answer:   Emergency Medical Condition (MA) [1]    XR ABD (KUB)     Standing Status:   Standing Number of Occurrences:   1     Order Specific Question:   Transport     Answer:   BED [2]     Order Specific Question:   Reason for Exam     Answer:   constipation    MRI BRAIN WO CONT     Standing Status:   Standing     Number of Occurrences:   1     Order Specific Question:   Transport     Answer:   BED [2]     Order Specific Question:   Reason for Exam     Answer:   cerebellar     Order Specific Question:   Decision Support Exception     Answer:   Emergency Medical Condition (MA) [1]    URINALYSIS W/ RFLX MICROSCOPIC     Standing Status:   Standing     Number of Occurrences:   1    CBC WITH AUTOMATED DIFF     Standing Status:   Standing     Number of Occurrences:   1    METABOLIC PANEL, BASIC     Standing Status:   Standing     Number of Occurrences:   1    TSH 3RD GENERATION     Standing Status:   Standing     Number of Occurrences:   1    URINE MICROSCOPIC ONLY     Standing Status:   Standing     Number of Occurrences:   1       Labs -   No results found for this or any previous visit (from the past 12 hour(s)). Radiologic Studies -   MRI BRAIN WO CONT   Final Result      1. Profound cerebellar atrophy which is out of proportion to the degree of   cerebral atrophy. This can be seen in the setting of long-term anticonvulsant   therapy (Dilantin), related to long-standing ethanol abuse, and/or related to a   neurodegenerative process. 2.  Mild cerebral atrophy and chronic small vessel changes. 3.  No acute intracranial abnormalities are identified. CT HEAD WO CONT   Final Result      No acute intracranial abnormalities. Chronic disproportionate cerebellar   atrophy. XR ABD (KUB)   Final Result         1. Moderate to large amount of stool throughout the colon. 2. Additional chronic findings as above. CT Results  (Last 48 hours)      None          CXR Results  (Last 48 hours)      None            Disposition         Disposition:  Home    CLINICAL IMPRESSION:    1.  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 Unsteady gait    4. Shakiness    5. Forgetfulness        It should be noted that I will be the provider of record for this patient  Konrad Schofield MD    Follow-up Information       Follow up With Specialties Details Why Contact Info    Clinton Nguyen MD Internal Medicine Physician Call in 1 day  [address] [facility]      [facility] EMERGENCY DEPT Emergency Medicine Go to  If symptoms worsen [address] [facility]  [phone]    Mariano Vernon MD Neurology Call in 1 day follow up [address]  [phone]              Discharge Medication List as of 8/12/2022  5:18 PM          Please note that this dictation was completed with [employer], the computer voice recognition software. Quite often unanticipated grammatical, syntax, homophones, and other interpretive errors are inadvertently transcribed by the computer software. Please disregard these errors. Please excuse any errors that have escaped final proofreading.

## 2022-08-12 NOTE — ED TRIAGE NOTES
Pt report chills and urinary frequency X 6 days ago. Pt was newly diagnose  for UtI, was given ABX, however, pt stated that she's not feeling any better.

## 2022-08-12 NOTE — DISCHARGE INSTRUCTIONS
Please carefully read all discharge instructions    Please follow-up with neurology, please take all of your antibiotics for your urinary tract infection, we will follow up with you for the culture results. Please follow-up with a primary care physician and if you do not have one currently use the contact information provided to obtain an appointment. If none was provided please call the number on the back of your insurance card to locate a Primary care doctor. Many offices have \"cancellation lists\" that you can ask to be placed on; should a patient with an earlier appointment cancel you will be notified to take their place. Please return to the Emergency Room immediately if your symptoms worsen. Please return to the Emergency Department if you develop a fever, chills, cannot eat or drink due to nausea or vomiting, or if any of your symptoms worsen. If you do not have insurance you can use the below for your medications. [email or web address]. [email or web address]    What are GoodRx coupons? GoodRx coupons will help you pay less than the cash price for your prescription. [place] free to use and are accepted at virtually every [place] pharmacy. Your pharmacist will know how to enter the codes on the coupon to pull up the lowest discount available. Mobibeam Activation    Thank you for requesting access to Mobibeam. Please follow the instructions below to securely access and download your online medical record. Mobibeam allows you to send messages to your doctor, view your test results, renew your prescriptions, schedule appointments, and more. How Do I Sign Up? In your internet browser, go to [email or web address]  Click on the First Time User? Click Here link in the Sign In box. You will be redirect to the New Member Sign Up page. Enter your Mobibeam Access Code exactly as it appears below. You will not need to use this code after youve completed the sign-up process.  If you do not sign up before the expiration date, you must request a new code. Push IO Access Code: Activation code not generated  Current Push IO Status: Active    Enter the last four digits of your Social Security Number (xxxx) and Date of Birth (mm/dd/yyyy) as indicated and click Submit. You will be taken to the next sign-up page. Create a Push IO ID. This will be your Push IO login ID and cannot be changed, so think of one that is secure and easy to remember. Create a Push IO password. You can change your password at any time. Enter your Password Reset Question and Answer. This can be used at a later time if you forget your password. Enter your e-mail address. You will receive e-mail notification when new information is available in [address] Click Sign Up. You can now view and download portions of your medical record. Click the [employer] link to download a portable copy of your medical information. Additional Information    If you have questions, please visit the Frequently Asked Questions section of the Push IO website at [email or web address] Remember, Push IO is NOT to be used for urgent needs. For medical emergencies, dial 911.

## 2022-08-13 LAB
BACTERIA SPEC CULT: NORMAL
SERVICE CMNT-IMP: NORMAL

## 2022-08-17 ENCOUNTER — HOSPITAL ENCOUNTER (EMERGENCY)
Age: 77
Discharge: HOME | End: 2022-08-17
Attending: EMERGENCY MEDICINE
Payer: MEDICARE

## 2022-08-17 VITALS
RESPIRATION RATE: 18 BRPM | OXYGEN SATURATION: 100 % | HEART RATE: 81 BPM | WEIGHT: 120 LBS | HEIGHT: 60 IN | DIASTOLIC BLOOD PRESSURE: 64 MMHG | BODY MASS INDEX: 23.56 KG/M2 | SYSTOLIC BLOOD PRESSURE: 134 MMHG | TEMPERATURE: 99.5 F

## 2022-08-17 DIAGNOSIS — R68.83 CHILLS (WITHOUT FEVER): ICD-10-CM

## 2022-08-17 DIAGNOSIS — G11.9 CEREBELLAR ATAXIA (HCC): Primary | ICD-10-CM

## 2022-08-17 DIAGNOSIS — R82.81 STERILE PYURIA: ICD-10-CM

## 2022-08-17 DIAGNOSIS — R25.1 TREMOR: ICD-10-CM

## 2022-08-17 LAB
ANION GAP SERPL CALC-SCNC: 7 MMOL/L (ref 3–18)
APPEARANCE UR: CLEAR
ATRIAL RATE: 71 BPM
B PERT DNA SPEC QL NAA+PROBE: NOT DETECTED
BACTERIA URNS QL MICRO: ABNORMAL /HPF
BASOPHILS # BLD: 0.1 K/UL (ref 0–0.1)
BASOPHILS NFR BLD: 1 % (ref 0–2)
BILIRUB UR QL: NEGATIVE
BORDETELLA PARAPERTUSSIS PCR, BORPAR: NOT DETECTED
BUN SERPL-MCNC: 28 MG/DL (ref 7–18)
BUN/CREAT SERPL: 35 (ref 12–20)
C PNEUM DNA SPEC QL NAA+PROBE: NOT DETECTED
CALCIUM SERPL-MCNC: 9.8 MG/DL (ref 8.5–10.1)
CALCULATED P AXIS, ECG09: 58 DEGREES
CALCULATED R AXIS, ECG10: 13 DEGREES
CALCULATED T AXIS, ECG11: 26 DEGREES
CHLORIDE SERPL-SCNC: 104 MMOL/L (ref 100–111)
CO2 SERPL-SCNC: 25 MMOL/L (ref 21–32)
COLOR UR: ABNORMAL
CREAT SERPL-MCNC: 0.79 MG/DL (ref 0.6–1.3)
DIAGNOSIS, 93000: NORMAL
DIFFERENTIAL METHOD BLD: ABNORMAL
EOSINOPHIL # BLD: 0.2 K/UL (ref 0–0.4)
EOSINOPHIL NFR BLD: 2 % (ref 0–5)
EPITH CASTS URNS QL MICRO: ABNORMAL /LPF (ref 0–5)
ERYTHROCYTE [DISTWIDTH] IN BLOOD BY AUTOMATED COUNT: 12.4 % (ref 11.6–14.5)
FLUAV H1 2009 PAND RNA SPEC QL NAA+PROBE: NOT DETECTED
FLUAV H1 RNA SPEC QL NAA+PROBE: NOT DETECTED
FLUAV H3 RNA SPEC QL NAA+PROBE: NOT DETECTED
FLUAV SUBTYP SPEC NAA+PROBE: NOT DETECTED
FLUBV RNA SPEC QL NAA+PROBE: NOT DETECTED
GLUCOSE BLD STRIP.AUTO-MCNC: 110 MG/DL (ref 70–110)
GLUCOSE SERPL-MCNC: 113 MG/DL (ref 74–99)
GLUCOSE UR STRIP.AUTO-MCNC: NEGATIVE MG/DL
HADV DNA SPEC QL NAA+PROBE: NOT DETECTED
HCOV 229E RNA SPEC QL NAA+PROBE: NOT DETECTED
HCOV HKU1 RNA SPEC QL NAA+PROBE: NOT DETECTED
HCOV NL63 RNA SPEC QL NAA+PROBE: NOT DETECTED
HCOV OC43 RNA SPEC QL NAA+PROBE: NOT DETECTED
HCT VFR BLD AUTO: 36.2 % (ref 35–45)
HGB BLD-MCNC: 12.2 G/DL (ref 12–16)
HGB UR QL STRIP: NEGATIVE
HMPV RNA SPEC QL NAA+PROBE: NOT DETECTED
HPIV1 RNA SPEC QL NAA+PROBE: NOT DETECTED
HPIV2 RNA SPEC QL NAA+PROBE: NOT DETECTED
HPIV3 RNA SPEC QL NAA+PROBE: NOT DETECTED
HPIV4 RNA SPEC QL NAA+PROBE: NOT DETECTED
IMM GRANULOCYTES # BLD AUTO: 0 K/UL (ref 0–0.04)
IMM GRANULOCYTES NFR BLD AUTO: 0 % (ref 0–0.5)
KETONES UR QL STRIP.AUTO: 15 MG/DL
LEUKOCYTE ESTERASE UR QL STRIP.AUTO: ABNORMAL
LYMPHOCYTES # BLD: 1.9 K/UL (ref 0.9–3.6)
LYMPHOCYTES NFR BLD: 28 % (ref 21–52)
M PNEUMO DNA SPEC QL NAA+PROBE: NOT DETECTED
MCH RBC QN AUTO: 33.4 PG (ref 24–34)
MCHC RBC AUTO-ENTMCNC: 33.7 G/DL (ref 31–37)
MCV RBC AUTO: 99.2 FL (ref 78–100)
MONOCYTES # BLD: 0.7 K/UL (ref 0.05–1.2)
MONOCYTES NFR BLD: 10 % (ref 3–10)
NEUTS SEG # BLD: 4 K/UL (ref 1.8–8)
NEUTS SEG NFR BLD: 58 % (ref 40–73)
NITRITE UR QL STRIP.AUTO: NEGATIVE
NRBC # BLD: 0 K/UL (ref 0–0.01)
NRBC BLD-RTO: 0 PER 100 WBC
P-R INTERVAL, ECG05: 160 MS
PH UR STRIP: 7.5 [PH] (ref 5–8)
PLATELET # BLD AUTO: 327 K/UL (ref 135–420)
PMV BLD AUTO: 9.7 FL (ref 9.2–11.8)
POTASSIUM SERPL-SCNC: 4 MMOL/L (ref 3.5–5.5)
PROT UR STRIP-MCNC: 30 MG/DL
Q-T INTERVAL, ECG07: 398 MS
QRS DURATION, ECG06: 82 MS
QTC CALCULATION (BEZET), ECG08: 432 MS
RBC # BLD AUTO: 3.65 M/UL (ref 4.2–5.3)
RBC #/AREA URNS HPF: ABNORMAL /HPF (ref 0–5)
RSV RNA SPEC QL NAA+PROBE: NOT DETECTED
RV+EV RNA SPEC QL NAA+PROBE: NOT DETECTED
SARS-COV-2 PCR, COVPCR: NOT DETECTED
SODIUM SERPL-SCNC: 136 MMOL/L (ref 136–145)
SP GR UR REFRACTOMETRY: >1.03 (ref 1–1.03)
UROBILINOGEN UR QL STRIP.AUTO: 1 EU/DL (ref 0.2–1)
VENTRICULAR RATE, ECG03: 71 BPM
WBC # BLD AUTO: 6.9 K/UL (ref 4.6–13.2)
WBC URNS QL MICRO: ABNORMAL /HPF (ref 0–5)

## 2022-08-17 PROCEDURE — 85025 COMPLETE CBC W/AUTO DIFF WBC: CPT

## 2022-08-17 PROCEDURE — 0202U NFCT DS 22 TRGT SARS-COV-2: CPT

## 2022-08-17 PROCEDURE — 93005 ELECTROCARDIOGRAM TRACING: CPT

## 2022-08-17 PROCEDURE — 80048 BASIC METABOLIC PNL TOTAL CA: CPT

## 2022-08-17 PROCEDURE — 82962 GLUCOSE BLOOD TEST: CPT

## 2022-08-17 PROCEDURE — 74011250637 HC RX REV CODE- 250/637: Performed by: EMERGENCY MEDICINE

## 2022-08-17 PROCEDURE — 74011250636 HC RX REV CODE- 250/636

## 2022-08-17 PROCEDURE — 81001 URINALYSIS AUTO W/SCOPE: CPT

## 2022-08-17 PROCEDURE — 99284 EMERGENCY DEPT VISIT MOD MDM: CPT

## 2022-08-17 RX ORDER — HYDROXYZINE 25 MG/1
25 TABLET, FILM COATED ORAL
Status: COMPLETED | OUTPATIENT
Start: 2022-08-17 | End: 2022-08-17

## 2022-08-17 RX ORDER — CEFTRIAXONE 1 G/1
INJECTION, POWDER, FOR SOLUTION INTRAMUSCULAR; INTRAVENOUS
Status: COMPLETED
Start: 2022-08-17 | End: 2022-08-17

## 2022-08-17 RX ADMIN — CEFTRIAXONE SODIUM: 1 INJECTION, POWDER, FOR SOLUTION INTRAMUSCULAR; INTRAVENOUS at 03:15

## 2022-08-17 RX ADMIN — HYDROXYZINE HYDROCHLORIDE 25 MG: 25 TABLET, FILM COATED ORAL at 06:06

## 2022-08-17 NOTE — ED TRIAGE NOTES
C/O acute on chronic episode of shaking that has been intermittant x 2 wks. [facility]orses [facility] and given [facility]x for UTI 9 days ago, has 2 more days of abx. Endorses shaking for 2 hrs tonight. Seen on 8/12 and 8/10 for similar episodes. Pt and family member clearly state that this episode is identical to previous episodes.

## 2022-08-17 NOTE — ED PROVIDER NOTES
EMERGENCY DEPARTMENT HISTORY AND PHYSICAL EXAM    Date: 8/17/2022  Patient Name: Stan Dacosta    History of Presenting Illness     Chief Complaint   Patient presents with    Chills         History Provided By: Patient and Patient's     Additional History (Context):   1:02 AM  Stan Dacosta is a 65 y.o. female with PMHX of moderate cerebellar ataxia, reflux, chronic low back pain, arthritis who presents to the emergency department C/O uncontrolled shaking. Patient with history of cerebellar ataxia presents with her  today for exacerbation of her tremulous. They also mentioned worsening of possible chills. She and her  deny any actually known fevers. The patient does seems to have periods of confusion. She has a chronic unsteady gait, however tremulous this seems to be new. She has had for several rounds of antibiotics in order to gain control of the suspected bladder infection. Both previous attempts at urine cultures grew out no bacteria in spite of white cells and leuk esterase activity. She continues to complain of nausea without actual vomiting. Patient denies pain to her head or neck, chest abdomen or pelvis. No further history is available. Social History  Smoking drinking or drugs currently or in the past.     Family History  Sister with lung cancer who is a non-smoker. Brother with a stroke. Sister also with cerebellar ataxia. PCP: Nilsa Johnson MD    Current Outpatient Medications   Medication Sig Dispense Refill    famotidine (PEPCID) 20 mg tablet Take 1 Tab by mouth two (2) times daily as needed for Pain or Nausea for up to 30 doses. 30 Tab 0    polyethylene glycol (MIRALAX) 17 gram/dose powder Take 17 g by mouth daily. 1 tablespoon with 8 oz of water daily 300 g 0    metoprolol tartrate (LOPRESSOR) 25 mg tablet Take 1 Tab by mouth every twelve (12) hours. 60 Tab 0    gabapentin (NEURONTIN) 100 mg capsule Take 400 mg by mouth nightly.       CALCIUM CARBONATE/VITAMIN D3 (CALCIUM 500 + D PO) Take 1 Tab by mouth two (2) times a day. MULTIVIT-MIN/IRON/FOLIC/LUTEIN (CENTRUM SILVER WOMEN PO) Take  by mouth daily. ACETAMINOPHEN/DIPHENHYDRAMINE (TYLENOL PM PO) Take 2 Tabs by mouth nightly. PARoxetine (PAXIL) 20 mg tablet Take 20 mg by mouth daily. ascorbic acid (VITAMIN C) 500 mg tablet Take 500 mg by mouth daily. cholecalciferol (VITAMIN D3) 1,000 unit tablet Take 1,000 Units by mouth daily. omega-3 fatty acids-vitamin e (FISH OIL) 1,000 mg Cap Take 1 Cap by mouth daily. Past History     Past Medical History:  Past Medical History:   Diagnosis Date    Arthritis     Chronic pain     lower back    GERD (gastroesophageal reflux disease)     Ill-defined condition     Gita cerebella ataxia    Menopause     Age 47    Other ill-defined conditions(799.89)     Gita's Cerebellar Ataxia    Other ill-defined conditions(799.89)     Pessary    Psychiatric disorder     depression       Past Surgical History:  Past Surgical History:   Procedure Laterality Date    HX CERVICAL FUSION  2012    anterior    HX GI      prolapse rectum    HX HEENT      OS muscle surgery    HX ORTHOPAEDIC      Right knee ORIF    HX ORTHOPAEDIC  2014    right total hip    HX OTHER SURGICAL      repair rectal prolapse       Family History:  Family History   Problem Relation Age of Onset    Breast Cancer Maternal Aunt        Social History:  Social History     Tobacco Use    Smoking status: Never    Smokeless tobacco: Never   Substance Use Topics    Alcohol use: Yes     Alcohol/week: 0.8 standard drinks     Types: 1 Glasses of wine per week    Drug use: No       Allergies:  No Known Allergies      Review of Systems   Review of Systems   Constitutional:  Positive for chills. Genitourinary:  Positive for difficulty urinating. Neurological:  Positive for tremors. All other systems reviewed and are negative.     Physical Exam     Vitals:    08/17/22 0334 08/17/22 2881 08/17/22 0404 08/17/22 0520   BP: 126/61 132/71 123/62 134/64   Pulse:       Resp:       Temp:       SpO2: 95% 96% 95% 100%   Weight:       Height:         Physical Exam  Vitals and nursing note reviewed. Constitutional:       General: She is not in acute distress. Appearance: She is well-developed. She is not diaphoretic. HENT:      Head: Normocephalic and atraumatic. Eyes:      General: No scleral icterus. Extraocular Movements:      Right eye: Normal extraocular motion. Left eye: Normal extraocular motion. Conjunctiva/sclera: Conjunctivae normal.      Pupils: Pupils are equal, round, and reactive to light. Neck:      Trachea: No tracheal deviation. Cardiovascular:      Rate and Rhythm: Normal rate and regular rhythm. Heart sounds: Normal heart sounds. Pulmonary:      Effort: Pulmonary effort is normal. No respiratory distress. Breath sounds: Normal breath sounds. No stridor. Abdominal:      General: Bowel sounds are normal. There is no distension. Palpations: Abdomen is soft. Tenderness: There is no abdominal tenderness. There is no rebound. Musculoskeletal:         General: No tenderness. Normal range of motion. Cervical back: Normal range of motion and neck supple. Comments: Grossly unremarkable without abnormalities   Skin:     General: Skin is warm and dry. Capillary Refill: Capillary refill takes less than 2 seconds. Findings: No erythema or rash. Neurological:      Mental Status: She is alert and oriented to person, place, and time. GCS: GCS eye subscore is 4. GCS verbal subscore is 5. GCS motor subscore is 6. Cranial Nerves: Cranial nerves are intact. No cranial nerve deficit. Motor: No weakness. Deep Tendon Reflexes:      Reflex Scores:       Achilles reflexes are 2+ on the right side and 2+ on the left side.      Comments: She seems to have a minor resting tremor which waxes and wanes and consistent with her at rest or with intentional movements. No fine motor coordination is diminished especially to truncal muscles. Psychiatric:         Mood and Affect: Mood normal.         Behavior: Behavior normal.         Thought Content: Thought content normal.         Judgment: Judgment normal.      Comments: She answers all questions appropriately although with a slightly delayed response. Diagnostic Study Results     Labs -    Recent Results (from the past 24 hour(s))   GLUCOSE, POC    Collection Time: 08/17/22 12:59 AM   Result Value Ref Range    Glucose (POC) 110 70 - 110 mg/dL   CBC WITH AUTOMATED DIFF    Collection Time: 08/17/22  1:00 AM   Result Value Ref Range    WBC 6.9 4.6 - 13.2 K/uL    RBC 3.65 (L) 4.20 - 5.30 M/uL    HGB 12.2 12.0 - 16.0 g/dL    HCT 36.2 35.0 - 45.0 %    MCV 99.2 78.0 - 100.0 FL    MCH 33.4 24.0 - 34.0 PG    MCHC 33.7 31.0 - 37.0 g/dL    RDW 12.4 11.6 - 14.5 %    PLATELET 067 527 - 310 K/uL    MPV 9.7 9.2 - 11.8 FL    NRBC 0.0 0  WBC    ABSOLUTE NRBC 0.00 0.00 - 0.01 K/uL    NEUTROPHILS 58 40 - 73 %    LYMPHOCYTES 28 21 - 52 %    MONOCYTES 10 3 - 10 %    EOSINOPHILS 2 0 - 5 %    BASOPHILS 1 0 - 2 %    IMMATURE GRANULOCYTES 0 0.0 - 0.5 %    ABS. NEUTROPHILS 4.0 1.8 - 8.0 K/UL    ABS. LYMPHOCYTES 1.9 0.9 - 3.6 K/UL    ABS. MONOCYTES 0.7 0.05 - 1.2 K/UL    ABS. EOSINOPHILS 0.2 0.0 - 0.4 K/UL    ABS. BASOPHILS 0.1 0.0 - 0.1 K/UL    ABS. IMM.  GRANS. 0.0 0.00 - 0.04 K/UL    DF AUTOMATED     METABOLIC PANEL, BASIC    Collection Time: 08/17/22  1:00 AM   Result Value Ref Range    Sodium 136 136 - 145 mmol/L    Potassium 4.0 3.5 - 5.5 mmol/L    Chloride 104 100 - 111 mmol/L    CO2 25 21 - 32 mmol/L    Anion gap 7 3.0 - 18 mmol/L    Glucose 113 (H) 74 - 99 mg/dL    BUN 28 (H) 7.0 - 18 MG/DL    Creatinine 0.79 0.6 - 1.3 MG/DL    BUN/Creatinine ratio 35 (H) 12 - 20      GFR est AA >60 >60 ml/min/1.73m2    GFR est non-AA >60 >60 ml/min/1.73m2    Calcium 9.8 8.5 - 10.1 MG/DL   EKG, 12 LEAD, INITIAL    Collection Time: 08/17/22  1:07 AM   Result Value Ref Range    Ventricular Rate 71 BPM    Atrial Rate 71 BPM    P-R Interval 160 ms    QRS Duration 82 ms    Q-T Interval 398 ms    QTC Calculation (Bezet) 432 ms    Calculated P Axis 58 degrees    Calculated R Axis 13 degrees    Calculated T Axis 26 degrees    Diagnosis       Normal sinus rhythm  Normal ECG  When compared with ECG of 08-JAN-2020 06:52,  Nonspecific T wave abnormality no longer evident in Anterior leads     URINALYSIS W/ RFLX MICROSCOPIC    Collection Time: 08/17/22  1:40 AM   Result Value Ref Range    Color DARK YELLOW      Appearance CLEAR      Specific gravity >1.030 (H) 1.003 - 1.030    pH (UA) 7.5 5.0 - 8.0      Protein 30 (A) NEG mg/dL    Glucose Negative NEG mg/dL    Ketone 15 (A) NEG mg/dL    Bilirubin Negative NEG      Blood Negative NEG      Urobilinogen 1.0 0.2 - 1.0 EU/dL    Nitrites Negative NEG      Leukocyte Esterase MODERATE (A) NEG     URINE MICROSCOPIC ONLY    Collection Time: 08/17/22  1:40 AM   Result Value Ref Range    WBC 4 to 10 0 - 5 /hpf    RBC 0 to 3 0 - 5 /hpf    Epithelial cells 2+ 0 - 5 /lpf    Bacteria FEW (A) NEG /hpf          Radiologic Studies -   No orders to display     CT Results  (Last 48 hours)      None          CXR Results  (Last 48 hours)      None            Medications given in the ED-  Medications   cefTRIAXone (ROCEPHIN) 1 gram injection (  Given 8/17/22 0315)   hydrOXYzine HCL (ATARAX) tablet 25 mg (25 mg Oral Given 8/17/22 0606)         Medical Decision Making   I am the first provider for this patient. I reviewed the vital signs, available nursing notes, past medical history, past surgical history, family history and social history. Vital Signs-Reviewed the patient's vital signs.     Pulse Oximetry Analysis - 100% on room air    Cardiac Monitor:  Rate: 80 bpm  Rhythm: Sinus rhythm    EKG interpretation: (Preliminary)  1:07 AM    Normal sinus rhythm, rate 71, normal ST segments, QTC is 432  EKG read by Fredia Mortimer, MD      Records Reviewed: NURSING NOTES AND PREVIOUS MEDICAL RECORDS    Provider Notes (Medical Decision Making):   Patient with continued and worsening tremulousness. She continued continues to have urine suspicious for infection however reviewing her records her cultures were always normal.  She has had numerous x-rays unremarkable. Brain scans shows chronic conditions. There is no indication for admission or emergent MRI. She needs follow-up with a neurologist.  Salts electrolytes showed no maladies aside from dehydration. Procedures:  Procedures    ED Course:   1:02 AM: Initial assessment performed. The patients presenting problems have been discussed, and they are in agreement with the care plan formulated and outlined with them. I have encouraged them to ask questions as they arise throughout their visit. Diagnosis and Disposition       DISCHARGE NOTE:  5:20 AM  Angela Villaseñor's  results have been reviewed with her. She has been counseled regarding her diagnosis, treatment, and plan. She verbally conveys understanding and agreement of the signs, symptoms, diagnosis, treatment and prognosis and additionally agrees to follow up as discussed. She also agrees with the care-plan and conveys that all of her questions have been answered. I have also provided discharge instructions for her that include: educational information regarding their diagnosis and treatment, and list of reasons why they would want to return to the ED prior to their follow-up appointment, should her condition change. She has been provided with education for proper emergency department utilization. CLINICAL IMPRESSION:    1. Cerebellar ataxia ([address]    2. Tremor    3. Chills (without fever)    4. Sterile pyuria        PLAN:  1. D/C Home  2. Discharge Medication List as of 8/17/2022  6:14 AM        3.    Follow-up Information    None       _______________________________    This note was partially transcribed via voice recognition software. Although efforts have been made to catch any discrepancies, it may contain sound alike words, grammatical errors, or nonsensical words.

## 2022-08-17 NOTE — ED NOTES
Multiple attempts made to contact  for ride. Multiple messages left on voicemail for Pt to be picked up.

## 2022-08-17 NOTE — ED NOTES
Pt alert and oriented reports intermittent episodes of chills, recently given abx for UTI. Same complaint 3 seen here and treated for the chills. Pt denies any shortness of breath, chest pains or any other complaints, states she feels hot and cold. Pt has a patent airway.

## 2022-08-25 ENCOUNTER — HOSPITAL ENCOUNTER (INPATIENT)
Age: 77
LOS: 7 days | Discharge: NURSING, REHAB OR LONG-TERM CARE | DRG: 309 | End: 2022-09-02
Attending: EMERGENCY MEDICINE | Admitting: INTERNAL MEDICINE
Payer: MEDICARE

## 2022-08-25 ENCOUNTER — APPOINTMENT (OUTPATIENT)
Dept: GENERAL RADIOLOGY | Age: 77
DRG: 309 | End: 2022-08-25
Attending: EMERGENCY MEDICINE
Payer: MEDICARE

## 2022-08-25 DIAGNOSIS — Z98.890 HISTORY OF BACK SURGERY: ICD-10-CM

## 2022-08-25 DIAGNOSIS — I48.91 ATRIAL FIBRILLATION WITH RVR (HCC): Primary | ICD-10-CM

## 2022-08-25 DIAGNOSIS — G11.2: ICD-10-CM

## 2022-08-25 DIAGNOSIS — R42 DIZZINESS: ICD-10-CM

## 2022-08-25 DIAGNOSIS — I48.0 PAROXYSMAL ATRIAL FIBRILLATION (HCC): ICD-10-CM

## 2022-08-25 LAB
ALBUMIN SERPL-MCNC: 4.1 G/DL (ref 3.4–5)
ALBUMIN/GLOB SERPL: 1.3 {RATIO} (ref 0.8–1.7)
ALP SERPL-CCNC: 74 U/L (ref 45–117)
ALT SERPL-CCNC: 22 U/L (ref 13–56)
ANION GAP SERPL CALC-SCNC: 3 MMOL/L (ref 3–18)
AST SERPL-CCNC: 18 U/L (ref 10–38)
BASOPHILS # BLD: 0.1 K/UL (ref 0–0.1)
BASOPHILS NFR BLD: 1 % (ref 0–2)
BILIRUB SERPL-MCNC: 0.7 MG/DL (ref 0.2–1)
BNP SERPL-MCNC: 255 PG/ML (ref 0–1800)
BUN SERPL-MCNC: 14 MG/DL (ref 7–18)
BUN/CREAT SERPL: 19 (ref 12–20)
CALCIUM SERPL-MCNC: 9.5 MG/DL (ref 8.5–10.1)
CHLORIDE SERPL-SCNC: 104 MMOL/L (ref 100–111)
CO2 SERPL-SCNC: 31 MMOL/L (ref 21–32)
CREAT SERPL-MCNC: 0.75 MG/DL (ref 0.6–1.3)
DIFFERENTIAL METHOD BLD: ABNORMAL
EOSINOPHIL # BLD: 0.1 K/UL (ref 0–0.4)
EOSINOPHIL NFR BLD: 1 % (ref 0–5)
ERYTHROCYTE [DISTWIDTH] IN BLOOD BY AUTOMATED COUNT: 12.4 % (ref 11.6–14.5)
GLOBULIN SER CALC-MCNC: 3.2 G/DL (ref 2–4)
GLUCOSE SERPL-MCNC: 97 MG/DL (ref 74–99)
HCT VFR BLD AUTO: 39.8 % (ref 35–45)
HGB BLD-MCNC: 12.9 G/DL (ref 12–16)
IMM GRANULOCYTES # BLD AUTO: 0 K/UL (ref 0–0.04)
IMM GRANULOCYTES NFR BLD AUTO: 0 % (ref 0–0.5)
LYMPHOCYTES # BLD: 1 K/UL (ref 0.9–3.6)
LYMPHOCYTES NFR BLD: 15 % (ref 21–52)
MAGNESIUM SERPL-MCNC: 2.1 MG/DL (ref 1.6–2.6)
MCH RBC QN AUTO: 32.5 PG (ref 24–34)
MCHC RBC AUTO-ENTMCNC: 32.4 G/DL (ref 31–37)
MCV RBC AUTO: 100.3 FL (ref 78–100)
MONOCYTES # BLD: 0.6 K/UL (ref 0.05–1.2)
MONOCYTES NFR BLD: 9 % (ref 3–10)
NEUTS SEG # BLD: 4.8 K/UL (ref 1.8–8)
NEUTS SEG NFR BLD: 73 % (ref 40–73)
NRBC # BLD: 0 K/UL (ref 0–0.01)
NRBC BLD-RTO: 0 PER 100 WBC
PLATELET # BLD AUTO: 299 K/UL (ref 135–420)
PMV BLD AUTO: 10.1 FL (ref 9.2–11.8)
POTASSIUM SERPL-SCNC: 3.8 MMOL/L (ref 3.5–5.5)
PROT SERPL-MCNC: 7.3 G/DL (ref 6.4–8.2)
RBC # BLD AUTO: 3.97 M/UL (ref 4.2–5.3)
SODIUM SERPL-SCNC: 138 MMOL/L (ref 136–145)
TROPONIN-HIGH SENSITIVITY: 5 NG/L (ref 0–54)
WBC # BLD AUTO: 6.5 K/UL (ref 4.6–13.2)

## 2022-08-25 PROCEDURE — 83735 ASSAY OF MAGNESIUM: CPT

## 2022-08-25 PROCEDURE — 74011000258 HC RX REV CODE- 258: Performed by: EMERGENCY MEDICINE

## 2022-08-25 PROCEDURE — 80053 COMPREHEN METABOLIC PANEL: CPT

## 2022-08-25 PROCEDURE — 96374 THER/PROPH/DIAG INJ IV PUSH: CPT

## 2022-08-25 PROCEDURE — 74011000250 HC RX REV CODE- 250: Performed by: EMERGENCY MEDICINE

## 2022-08-25 PROCEDURE — 84484 ASSAY OF TROPONIN QUANT: CPT

## 2022-08-25 PROCEDURE — 85025 COMPLETE CBC W/AUTO DIFF WBC: CPT

## 2022-08-25 PROCEDURE — 83880 ASSAY OF NATRIURETIC PEPTIDE: CPT

## 2022-08-25 PROCEDURE — 99285 EMERGENCY DEPT VISIT HI MDM: CPT

## 2022-08-25 PROCEDURE — 71045 X-RAY EXAM CHEST 1 VIEW: CPT

## 2022-08-25 PROCEDURE — 93005 ELECTROCARDIOGRAM TRACING: CPT

## 2022-08-25 RX ORDER — DILTIAZEM HYDROCHLORIDE 5 MG/ML
10 INJECTION INTRAVENOUS
Status: COMPLETED | OUTPATIENT
Start: 2022-08-25 | End: 2022-08-25

## 2022-08-25 RX ORDER — DILTIAZEM HYDROCHLORIDE 5 MG/ML
20 INJECTION INTRAVENOUS
Status: DISCONTINUED | OUTPATIENT
Start: 2022-08-25 | End: 2022-08-25

## 2022-08-25 RX ADMIN — DILTIAZEM HYDROCHLORIDE 5 MG/HR: 5 INJECTION, SOLUTION INTRAVENOUS at 20:25

## 2022-08-25 RX ADMIN — DILTIAZEM HYDROCHLORIDE 10 MG: 5 INJECTION, SOLUTION INTRAVENOUS at 20:25

## 2022-08-25 NOTE — ED TRIAGE NOTES
Pt arrived with c/o dizziness and weakness. Pt has been seen here and other Eds multiple times for the same complaint. Pt is alert and oriented x4. Vital signs are stable. Pt was diagnosed with a UTI and has been taking ABX. Pt states \"I think the medicine is making it worse\".

## 2022-08-25 NOTE — Clinical Note
Status[de-identified] INPATIENT [101]   Type of Bed: Telemetry [19]   Cardiac Monitoring Required?: Yes   Inpatient Hospitalization Certified Necessary for the Following Reasons: 3. Patient receiving treatment that can only be provided in an inpatient setting (further clarification in H&P documentation)   Admitting Diagnosis: Atrial fibrillation ([facility] [427.31. ICD-9-CM]   Admitting Diagnosis: Dizziness [522673]   Admitting Diagnosis: Ataxia [752881]   Admitting Physician: Matt Pack [[id number]]   Attending Physician: Matt Pack [[id number]]   Estimated Length of Stay: 2 Midnights   Discharge Plan[de-identified] Home with Office Follow-up

## 2022-08-26 ENCOUNTER — APPOINTMENT (OUTPATIENT)
Dept: CT IMAGING | Age: 77
DRG: 309 | End: 2022-08-26
Attending: INTERNAL MEDICINE
Payer: MEDICARE

## 2022-08-26 ENCOUNTER — APPOINTMENT (OUTPATIENT)
Dept: NON INVASIVE DIAGNOSTICS | Age: 77
DRG: 309 | End: 2022-08-26
Attending: INTERNAL MEDICINE
Payer: MEDICARE

## 2022-08-26 PROBLEM — K59.00 CONSTIPATED: Status: ACTIVE | Noted: 2022-08-26

## 2022-08-26 PROBLEM — R27.0 ATAXIA: Status: ACTIVE | Noted: 2022-08-26

## 2022-08-26 PROBLEM — I48.91 ATRIAL FIBRILLATION (HCC): Status: ACTIVE | Noted: 2022-08-26

## 2022-08-26 PROBLEM — R42 DIZZINESS: Status: ACTIVE | Noted: 2022-08-26

## 2022-08-26 LAB
ALBUMIN SERPL-MCNC: 3.6 G/DL (ref 3.4–5)
ALBUMIN/GLOB SERPL: 1.3 {RATIO} (ref 0.8–1.7)
ALP SERPL-CCNC: 62 U/L (ref 45–117)
ALT SERPL-CCNC: 19 U/L (ref 13–56)
ANION GAP SERPL CALC-SCNC: 7 MMOL/L (ref 3–18)
AST SERPL-CCNC: 16 U/L (ref 10–38)
BASOPHILS # BLD: 0 K/UL (ref 0–0.1)
BASOPHILS NFR BLD: 1 % (ref 0–2)
BILIRUB SERPL-MCNC: 0.7 MG/DL (ref 0.2–1)
BUN SERPL-MCNC: 14 MG/DL (ref 7–18)
BUN/CREAT SERPL: 24 (ref 12–20)
CALCIUM SERPL-MCNC: 9.2 MG/DL (ref 8.5–10.1)
CHLORIDE SERPL-SCNC: 105 MMOL/L (ref 100–111)
CO2 SERPL-SCNC: 27 MMOL/L (ref 21–32)
CREAT SERPL-MCNC: 0.59 MG/DL (ref 0.6–1.3)
DIFFERENTIAL METHOD BLD: ABNORMAL
ECHO AO ROOT DIAM: 3.2 CM
ECHO AO ROOT INDEX: 2.13 CM/M2
ECHO AR MAX VEL PISA: 4.5 M/S
ECHO AV AREA PEAK VELOCITY: 1.7 CM2
ECHO AV AREA VTI: 2 CM2
ECHO AV AREA/BSA PEAK VELOCITY: 1.1 CM2/M2
ECHO AV AREA/BSA VTI: 1.3 CM2/M2
ECHO AV MEAN GRADIENT: 3 MMHG
ECHO AV MEAN VELOCITY: 0.8 M/S
ECHO AV PEAK GRADIENT: 7 MMHG
ECHO AV PEAK VELOCITY: 1.3 M/S
ECHO AV REGURGITANT PHT: 845.8 MILLISECOND
ECHO AV VELOCITY RATIO: 0.54
ECHO AV VTI: 24.8 CM
ECHO EST RA PRESSURE: 3 MMHG
ECHO IVC PROX: 1.8 CM
ECHO LA DIAMETER INDEX: 2.8 CM/M2
ECHO LA DIAMETER: 4.2 CM
ECHO LA TO AORTIC ROOT RATIO: 1.31
ECHO LA VOL 2C: 32 ML (ref 22–52)
ECHO LA VOL 4C: 57 ML (ref 22–52)
ECHO LA VOL BP: 49 ML (ref 22–52)
ECHO LA VOL/BSA BIPLANE: 33 ML/M2 (ref 16–34)
ECHO LA VOLUME AREA LENGTH: 53 ML
ECHO LA VOLUME INDEX A2C: 21 ML/M2 (ref 16–34)
ECHO LA VOLUME INDEX A4C: 38 ML/M2 (ref 16–34)
ECHO LA VOLUME INDEX AREA LENGTH: 35 ML/M2 (ref 16–34)
ECHO LV E' LATERAL VELOCITY: 12 CM/S
ECHO LV E' SEPTAL VELOCITY: 7 CM/S
ECHO LV EDV A2C: 73 ML
ECHO LV EDV A4C: 74 ML
ECHO LV EDV BP: 76 ML (ref 56–104)
ECHO LV EDV INDEX A4C: 49 ML/M2
ECHO LV EDV INDEX BP: 51 ML/M2
ECHO LV EDV NDEX A2C: 49 ML/M2
ECHO LV EJECTION FRACTION A2C: 54 %
ECHO LV EJECTION FRACTION A4C: 56 %
ECHO LV EJECTION FRACTION BIPLANE: 56 % (ref 55–100)
ECHO LV ESV A2C: 33 ML
ECHO LV ESV A4C: 33 ML
ECHO LV ESV BP: 33 ML (ref 19–49)
ECHO LV ESV INDEX A2C: 22 ML/M2
ECHO LV ESV INDEX A4C: 22 ML/M2
ECHO LV ESV INDEX BP: 22 ML/M2
ECHO LV FRACTIONAL SHORTENING: 29 % (ref 28–44)
ECHO LV INTERNAL DIMENSION DIASTOLE INDEX: 3.2 CM/M2
ECHO LV INTERNAL DIMENSION DIASTOLIC: 4.8 CM (ref 3.9–5.3)
ECHO LV INTERNAL DIMENSION SYSTOLIC INDEX: 2.27 CM/M2
ECHO LV INTERNAL DIMENSION SYSTOLIC: 3.4 CM
ECHO LV IVSD: 0.7 CM (ref 0.6–0.9)
ECHO LV MASS 2D: 106.9 G (ref 67–162)
ECHO LV MASS INDEX 2D: 71.3 G/M2 (ref 43–95)
ECHO LV POSTERIOR WALL DIASTOLIC: 0.7 CM (ref 0.6–0.9)
ECHO LV RELATIVE WALL THICKNESS RATIO: 0.29
ECHO LVOT AREA: 3.5 CM2
ECHO LVOT AV VTI INDEX: 0.6
ECHO LVOT DIAM: 2.1 CM
ECHO LVOT MEAN GRADIENT: 1 MMHG
ECHO LVOT PEAK GRADIENT: 2 MMHG
ECHO LVOT PEAK VELOCITY: 0.7 M/S
ECHO LVOT STROKE VOLUME INDEX: 34.6 ML/M2
ECHO LVOT SV: 51.9 ML
ECHO LVOT VTI: 15 CM
ECHO MV A VELOCITY: 0.71 M/S
ECHO MV E DECELERATION TIME (DT): 149 MS
ECHO MV E VELOCITY: 0.69 M/S
ECHO MV E/A RATIO: 0.97
ECHO MV E/E' LATERAL: 5.75
ECHO MV E/E' RATIO (AVERAGED): 7.8
ECHO MV E/E' SEPTAL: 9.86
ECHO PULMONARY ARTERY END DIASTOLIC PRESSURE: 3 MMHG
ECHO PULMONARY ARTERY SYSTOLIC PRESSURE (PASP): 25 MMHG
ECHO PV REGURGITANT MAX VELOCITY: 0.8 M/S
ECHO RIGHT VENTRICULAR SYSTOLIC PRESSURE (RVSP): 25 MMHG
ECHO RV FREE WALL PEAK S': 11 CM/S
ECHO RV INTERNAL DIMENSION: 3 CM
ECHO RV TAPSE: 2 CM (ref 1.7–?)
ECHO TV REGURGITANT MAX VELOCITY: 2.37 M/S
ECHO TV REGURGITANT PEAK GRADIENT: 23 MMHG
EOSINOPHIL # BLD: 0.1 K/UL (ref 0–0.4)
EOSINOPHIL NFR BLD: 2 % (ref 0–5)
ERYTHROCYTE [DISTWIDTH] IN BLOOD BY AUTOMATED COUNT: 12.6 % (ref 11.6–14.5)
GLOBULIN SER CALC-MCNC: 2.7 G/DL (ref 2–4)
GLUCOSE SERPL-MCNC: 84 MG/DL (ref 74–99)
HCT VFR BLD AUTO: 36 % (ref 35–45)
HGB BLD-MCNC: 11.5 G/DL (ref 12–16)
IMM GRANULOCYTES # BLD AUTO: 0 K/UL (ref 0–0.04)
IMM GRANULOCYTES NFR BLD AUTO: 0 % (ref 0–0.5)
LYMPHOCYTES # BLD: 0.8 K/UL (ref 0.9–3.6)
LYMPHOCYTES NFR BLD: 16 % (ref 21–52)
MAGNESIUM SERPL-MCNC: 2.2 MG/DL (ref 1.6–2.6)
MCH RBC QN AUTO: 32.7 PG (ref 24–34)
MCHC RBC AUTO-ENTMCNC: 31.9 G/DL (ref 31–37)
MCV RBC AUTO: 102.3 FL (ref 78–100)
MONOCYTES # BLD: 0.5 K/UL (ref 0.05–1.2)
MONOCYTES NFR BLD: 10 % (ref 3–10)
NEUTS SEG # BLD: 3.7 K/UL (ref 1.8–8)
NEUTS SEG NFR BLD: 71 % (ref 40–73)
NRBC # BLD: 0 K/UL (ref 0–0.01)
NRBC BLD-RTO: 0 PER 100 WBC
PLATELET # BLD AUTO: 258 K/UL (ref 135–420)
PMV BLD AUTO: 9.8 FL (ref 9.2–11.8)
POTASSIUM SERPL-SCNC: 4.3 MMOL/L (ref 3.5–5.5)
PROT SERPL-MCNC: 6.3 G/DL (ref 6.4–8.2)
RBC # BLD AUTO: 3.52 M/UL (ref 4.2–5.3)
SODIUM SERPL-SCNC: 139 MMOL/L (ref 136–145)
TROPONIN-HIGH SENSITIVITY: 6 NG/L (ref 0–54)
TROPONIN-HIGH SENSITIVITY: 7 NG/L (ref 0–54)
TSH SERPL DL<=0.05 MIU/L-ACNC: 1.31 UIU/ML (ref 0.36–3.74)
WBC # BLD AUTO: 5.2 K/UL (ref 4.6–13.2)

## 2022-08-26 PROCEDURE — 85025 COMPLETE CBC W/AUTO DIFF WBC: CPT

## 2022-08-26 PROCEDURE — 36415 COLL VENOUS BLD VENIPUNCTURE: CPT

## 2022-08-26 PROCEDURE — 93005 ELECTROCARDIOGRAM TRACING: CPT

## 2022-08-26 PROCEDURE — 84443 ASSAY THYROID STIM HORMONE: CPT

## 2022-08-26 PROCEDURE — C9113 INJ PANTOPRAZOLE SODIUM, VIA: HCPCS | Performed by: INTERNAL MEDICINE

## 2022-08-26 PROCEDURE — 74011250636 HC RX REV CODE- 250/636: Performed by: INTERNAL MEDICINE

## 2022-08-26 PROCEDURE — 74177 CT ABD & PELVIS W/CONTRAST: CPT

## 2022-08-26 PROCEDURE — 80053 COMPREHEN METABOLIC PANEL: CPT

## 2022-08-26 PROCEDURE — 74011250637 HC RX REV CODE- 250/637: Performed by: INTERNAL MEDICINE

## 2022-08-26 PROCEDURE — 65270000046 HC RM TELEMETRY

## 2022-08-26 PROCEDURE — 84484 ASSAY OF TROPONIN QUANT: CPT

## 2022-08-26 PROCEDURE — 74011000636 HC RX REV CODE- 636: Performed by: INTERNAL MEDICINE

## 2022-08-26 PROCEDURE — 83735 ASSAY OF MAGNESIUM: CPT

## 2022-08-26 PROCEDURE — 74011250637 HC RX REV CODE- 250/637: Performed by: HOSPITALIST

## 2022-08-26 PROCEDURE — 74011000250 HC RX REV CODE- 250: Performed by: INTERNAL MEDICINE

## 2022-08-26 PROCEDURE — 93306 TTE W/DOPPLER COMPLETE: CPT

## 2022-08-26 PROCEDURE — 71275 CT ANGIOGRAPHY CHEST: CPT

## 2022-08-26 PROCEDURE — 77010033678 HC OXYGEN DAILY

## 2022-08-26 RX ORDER — METOPROLOL TARTRATE 25 MG/1
25 TABLET, FILM COATED ORAL EVERY 12 HOURS
Status: DISCONTINUED | OUTPATIENT
Start: 2022-08-26 | End: 2022-08-27

## 2022-08-26 RX ORDER — ENOXAPARIN SODIUM 100 MG/ML
40 INJECTION SUBCUTANEOUS DAILY
Status: DISCONTINUED | OUTPATIENT
Start: 2022-08-26 | End: 2022-08-29

## 2022-08-26 RX ORDER — ONDANSETRON 4 MG/1
4 TABLET, ORALLY DISINTEGRATING ORAL
Status: DISCONTINUED | OUTPATIENT
Start: 2022-08-26 | End: 2022-09-02 | Stop reason: HOSPADM

## 2022-08-26 RX ORDER — SODIUM CHLORIDE 0.9 % (FLUSH) 0.9 %
5-40 SYRINGE (ML) INJECTION EVERY 8 HOURS
Status: DISCONTINUED | OUTPATIENT
Start: 2022-08-26 | End: 2022-09-02 | Stop reason: HOSPADM

## 2022-08-26 RX ORDER — SODIUM CHLORIDE 0.9 % (FLUSH) 0.9 %
5-40 SYRINGE (ML) INJECTION AS NEEDED
Status: DISCONTINUED | OUTPATIENT
Start: 2022-08-26 | End: 2022-09-02 | Stop reason: HOSPADM

## 2022-08-26 RX ORDER — SODIUM CHLORIDE 9 MG/ML
75 INJECTION, SOLUTION INTRAVENOUS CONTINUOUS
Status: DISPENSED | OUTPATIENT
Start: 2022-08-26 | End: 2022-08-27

## 2022-08-26 RX ORDER — ACETAMINOPHEN 325 MG/1
650 TABLET ORAL
Status: DISCONTINUED | OUTPATIENT
Start: 2022-08-26 | End: 2022-09-02 | Stop reason: HOSPADM

## 2022-08-26 RX ORDER — POLYETHYLENE GLYCOL 3350 17 G/17G
17 POWDER, FOR SOLUTION ORAL 2 TIMES DAILY
Status: DISCONTINUED | OUTPATIENT
Start: 2022-08-26 | End: 2022-09-02 | Stop reason: HOSPADM

## 2022-08-26 RX ORDER — ONDANSETRON 2 MG/ML
4 INJECTION INTRAMUSCULAR; INTRAVENOUS
Status: DISCONTINUED | OUTPATIENT
Start: 2022-08-26 | End: 2022-09-02 | Stop reason: HOSPADM

## 2022-08-26 RX ORDER — POLYETHYLENE GLYCOL 3350 17 G/17G
17 POWDER, FOR SOLUTION ORAL DAILY PRN
Status: DISCONTINUED | OUTPATIENT
Start: 2022-08-26 | End: 2022-09-02 | Stop reason: HOSPADM

## 2022-08-26 RX ORDER — AMOXICILLIN 250 MG
1 CAPSULE ORAL DAILY
Status: DISCONTINUED | OUTPATIENT
Start: 2022-08-26 | End: 2022-09-02 | Stop reason: HOSPADM

## 2022-08-26 RX ORDER — FACIAL-BODY WIPES
10 EACH TOPICAL DAILY PRN
Status: DISCONTINUED | OUTPATIENT
Start: 2022-08-26 | End: 2022-09-02 | Stop reason: HOSPADM

## 2022-08-26 RX ORDER — DIGOXIN 125 MCG
0.12 TABLET ORAL DAILY
Status: DISCONTINUED | OUTPATIENT
Start: 2022-08-27 | End: 2022-08-29

## 2022-08-26 RX ORDER — ACETAMINOPHEN 650 MG/1
650 SUPPOSITORY RECTAL
Status: DISCONTINUED | OUTPATIENT
Start: 2022-08-26 | End: 2022-09-02 | Stop reason: HOSPADM

## 2022-08-26 RX ORDER — ASCORBIC ACID 250 MG
500 TABLET ORAL DAILY
Status: DISCONTINUED | OUTPATIENT
Start: 2022-08-26 | End: 2022-09-02 | Stop reason: HOSPADM

## 2022-08-26 RX ORDER — GABAPENTIN 400 MG/1
400 CAPSULE ORAL
Status: DISCONTINUED | OUTPATIENT
Start: 2022-08-26 | End: 2022-09-02 | Stop reason: HOSPADM

## 2022-08-26 RX ORDER — SODIUM CHLORIDE 9 MG/ML
150 INJECTION, SOLUTION INTRAVENOUS CONTINUOUS
Status: DISCONTINUED | OUTPATIENT
Start: 2022-08-26 | End: 2022-08-26

## 2022-08-26 RX ADMIN — SODIUM CHLORIDE 40 MG: 9 INJECTION, SOLUTION INTRAMUSCULAR; INTRAVENOUS; SUBCUTANEOUS at 02:45

## 2022-08-26 RX ADMIN — SODIUM CHLORIDE, PRESERVATIVE FREE 10 ML: 5 INJECTION INTRAVENOUS at 14:00

## 2022-08-26 RX ADMIN — POLYETHYLENE GLYCOL 3350 17 G: 17 POWDER, FOR SOLUTION ORAL at 21:59

## 2022-08-26 RX ADMIN — GABAPENTIN 400 MG: 400 CAPSULE ORAL at 05:54

## 2022-08-26 RX ADMIN — ENOXAPARIN SODIUM 40 MG: 100 INJECTION SUBCUTANEOUS at 08:50

## 2022-08-26 RX ADMIN — SODIUM CHLORIDE 75 ML/HR: 900 INJECTION, SOLUTION INTRAVENOUS at 18:39

## 2022-08-26 RX ADMIN — IOPAMIDOL 100 ML: 755 INJECTION, SOLUTION INTRAVENOUS at 03:38

## 2022-08-26 RX ADMIN — POLYETHYLENE GLYCOL 3350 17 G: 17 POWDER, FOR SOLUTION ORAL at 09:32

## 2022-08-26 RX ADMIN — SODIUM CHLORIDE, PRESERVATIVE FREE 10 ML: 5 INJECTION INTRAVENOUS at 21:59

## 2022-08-26 RX ADMIN — METOPROLOL TARTRATE 25 MG: 25 TABLET, FILM COATED ORAL at 08:49

## 2022-08-26 RX ADMIN — Medication 1 TABLET: at 09:31

## 2022-08-26 RX ADMIN — SODIUM CHLORIDE 75 ML/HR: 900 INJECTION, SOLUTION INTRAVENOUS at 03:00

## 2022-08-26 RX ADMIN — Medication 500 MG: at 08:49

## 2022-08-26 RX ADMIN — SODIUM CHLORIDE, PRESERVATIVE FREE 10 ML: 5 INJECTION INTRAVENOUS at 02:45

## 2022-08-26 NOTE — ED NOTES
BP dropping slightly, MD aware and verbal orders for fluids placed. Fluids hanging at this time, will CTM.

## 2022-08-26 NOTE — PROGRESS NOTES
Reason for Admission:   Atrial fibrillation ([facility] [facility] [I48.91]  Dizziness [R42]  Ataxia [R27.0]    PCP: Shakira Bain MD    There are currently no Active Isolations  No active infections                RUR Score:     12%             Resources/supports,as identified by patient/family:       Lives with spouse in one level  Barriers to discharge:             Plan for utilizing home health:    Anticipate HH possible depending upon clinical progression and recommendations from therapy                          Likelihood of readmission:   12%         Transition of Care Plan:       Anticipate home with [facility] versus home with family             Initial assessment completed with patient. Cognitive status of patient: oriented to time, place, person and situation. Face sheet information confirmed:  yes. The patient designates her spouse to participate in her discharge plan and to receive any needed information. This patient lives in a single family home with patient and spouse. Patient is not able to navigate steps as needed. Prior to hospitalization, patient was considered to be independent with ADLs/IADLS : yes . The patient and spouse will be available to transport patient home upon discharge. The patient already has Marlon White available in the home. Patient is not currently active with home health. Patient has stayed in a skilled nursing facility or rehab. Was  stay within last 60 days : no. This patient is on dialysis/days :no     Currently, the discharge plan is Home and Home with [address] [place] [address] Jt Ramirez. The patient states that she can obtain her medications from the pharmacy, and take her medications as directed. Patient's current insurance is Payor: [employer]ICARE / Plan: [address] / Product Type: Managed Care Medicare /        Care Management Interventions  PCP Verified by CM:  Yes  Last Visit to PCP: 08/01/22  Transition of Care Consult (CM Consult): Discharge Planning  Support Systems: Spouse/Significant Other  Confirm Follow Up Transport: Family  The Plan for Transition of Care is Related to the Following Treatment Goals : home with [facility] versus vs. home with family  Discharge Location  Patient Expects to be Discharged to[de-identified] Home with family assistance

## 2022-08-26 NOTE — PROGRESS NOTES
Problem: Falls - Risk of  Goal: *Absence of Falls  Description: Document Vida Dillon Fall Risk and appropriate interventions in the flowsheet.   Outcome: Progressing Towards Goal  Note: Fall Risk Interventions:  Mobility Interventions: Assess mobility with egress test, PT Consult for mobility concerns, PT Consult for assist device competence, Patient to call before getting OOB         Medication Interventions: Assess postural VS orthostatic hypotension, Bed/chair exit alarm, Evaluate medications/consider consulting pharmacy    Elimination Interventions: Call light in reach, Elevated toilet seat, Patient to call for help with toileting needs, Stay With Me (per policy)    History of Falls Interventions: Door open when patient unattended, Evaluate medications/consider consulting pharmacy, Room close to nurse's station         Problem: Patient Education: Go to Patient Education Activity  Goal: Patient/Family Education  Outcome: Progressing Towards Goal

## 2022-08-26 NOTE — PROGRESS NOTES
7827 Bedside shift change report given to Diann Agudelo RN (oncoming nurse) by Joe Garduno RN (offgoing nurse). Report included the following information SBAR, Kardex, Intake/Output, MAR, and Recent Results. 1924 Bedside shift change report given to [facility] (oncoming nurse) by Apoorva Corral (offgoing nurse). Report included the following information SBAR, Kardex, Intake/Output, MAR, and Recent Results.

## 2022-08-26 NOTE — H&P
History & Physical    Patient: China Sykes MRN: [id number]  CSN: [id number]    YOB: 1945  Age: 65 y.o. Sex: female      DOA: 8/25/2022    Chief Complaint:   Chief Complaint   Patient presents with    Dizziness          HPI:     China Sykes is a 65 y.o.  female who has Rajni cerebellar ataxia, chronic back pain, GERD, depression presents to the emergency room for the third time with worsening shortness of breath. This time she was found to be in rapid ventricular response A. fib and was given diltiazem bolus with a drip and then converted into sinus bradycardia. Patient states she has been feeling increasingly unsteady and feeling lightheaded and dizzy one of the time she was in the emergency room was for fecal impaction this is since resolved. She also recently had an MRI with stable brain findings her biggest concern is shortness of breath she denies any chest pain or palpitations.   No recent travel 2 years she is vaccinated from [employer] and boosted she wishes to be a DO NOT RESUSCITATE  Past Medical History:   Diagnosis Date    Arthritis     Chronic pain     lower back    GERD (gastroesophageal reflux disease)     Ill-defined condition     Gita cerebella ataxia    Menopause     Age 47    Other ill-defined conditions(799.89)     Gita's Cerebellar Ataxia    Other ill-defined conditions(799.89)     Pessary    Psychiatric disorder     depression       Past Surgical History:   Procedure Laterality Date    HX CERVICAL FUSION  2012    anterior    HX GI      prolapse rectum    HX HEENT      OS muscle surgery    HX ORTHOPAEDIC      Right knee ORIF    HX ORTHOPAEDIC  2014    right total hip    HX OTHER SURGICAL      repair rectal prolapse       Family History   Problem Relation Age of Onset    Breast Cancer Maternal Aunt        Social History     Socioeconomic History    Marital status:    Tobacco Use    Smoking status: Never    Smokeless tobacco: Never   Substance and Sexual Activity Alcohol use: Yes     Alcohol/week: 0.8 standard drinks     Types: 1 Glasses of wine per week    Drug use: No       Prior to Admission medications    Medication Sig Start Date End Date Taking? Authorizing Provider   famotidine (PEPCID) 20 mg tablet Take 1 Tab by mouth two (2) times daily as needed for Pain or Nausea for up to 30 doses. 1/8/20   Sly Holliday MD   polyethylene glycol (MIRALAX) 17 gram/dose powder Take 17 g by mouth daily. 1 tablespoon with 8 oz of water daily 1/8/20   Sly Holliday MD   metoprolol tartrate (LOPRESSOR) 25 mg tablet Take 1 Tab by mouth every twelve (12) hours. 4/9/18   Madhuri Soni MD   gabapentin (NEURONTIN) 100 mg capsule Take 400 mg by mouth nightly. Provider, Historical   CALCIUM CARBONATE/VITAMIN D3 (CALCIUM 500 + D PO) Take 1 Tab by mouth two (2) times a day. Provider, Historical   MULTIVIT-MIN/IRON/FOLIC/LUTEIN (CENTRUM SILVER WOMEN PO) Take  by mouth daily. Provider, Historical   ACETAMINOPHEN/DIPHENHYDRAMINE (TYLENOL PM PO) Take 2 Tabs by mouth nightly. Provider, Historical   PARoxetine (PAXIL) 20 mg tablet Take 20 mg by mouth daily. Provider, Historical   ascorbic acid (VITAMIN C) 500 mg tablet Take 500 mg by mouth daily. Provider, Historical   cholecalciferol (VITAMIN D3) 1,000 unit tablet Take 1,000 Units by mouth daily. Provider, Historical   omega-3 fatty acids-vitamin e (FISH OIL) 1,000 mg Cap Take 1 Cap by mouth daily. Provider, Historical       No Known Allergies      Review of Systems  GENERAL: Patient alert, awake and oriented times 3, able to communicate full sentences and not in distress. HEENT: No change in vision, no earache, tinnitus, sore throat or sinus congestion. NECK: No pain or stiffness. PULMONARY: No shortness of breath, cough or wheeze. Cardiovascular: no pnd or orthopnea, no CP  GASTROINTESTINAL: No abdominal pain, nausea, vomiting or diarrhea, melena or bright red blood per rectum.    GENITOURINARY: No urinary frequency, urgency, hesitancy or dysuria. MUSCULOSKELETAL: No joint or muscle pain, no back pain, no recent trauma. DERMATOLOGIC: No rash, no itching, no lesions. ENDOCRINE: No polyuria, polydipsia, no heat or cold intolerance. No recent change in weight. HEMATOLOGICAL: No anemia or easy bruising or bleeding. NEUROLOGIC: No headache, seizures, numbness, tingling or weakness. Physical Exam:     Physical Exam:  Visit Vitals  BP (!) 102/47   Pulse 60   Temp 98.1 °F (36.7 °C)   Resp 13   Ht 5' (1.524 m)   Wt 54.4 kg (120 lb)   SpO2 96%   BMI 23.44 kg/m²      O2 Device: None (Room air)    Temp (24hrs), Av.1 °F (36.7 °C), Min:98.1 °F (36.7 °C), Max:98.1 °F (36.7 °C)    No intake/output data recorded. No intake/output data recorded. General:  Alert, cooperative, no distress, appears stated age. Head: Normocephalic, without obvious abnormality, atraumatic. Eyes:  Conjunctivae/corneas clear. PERRL, EOMs intact. Nose: Nares normal. No drainage or sinus tenderness. Neck: Supple, symmetrical, trachea midline, no adenopathy, thyroid: no enlargement, no carotid bruit and no JVD. Lungs:   Clear to auscultation bilaterally. Heart:  Regular rate and rhythm, S1, S2 normal.     Abdomen: Soft, non-tender. Bowel sounds normal.    Extremities: Extremities normal, atraumatic, no cyanosis or edema. Pulses: 2+ and symmetric all extremities. Skin:  No rashes or lesions   Neurologic: AAOx3, No focal motor or sensory deficit. Labs Reviewed:    Protonix IV and EKG    Procedures/imaging: see electronic medical records for all procedures/Xrays and details which were not copied into this note but were reviewed prior to creation of Plan      Assessment/Plan     Active Problems:    * No active hospital problems. *     1.   Atrial flutter with rapid ventricular response now sinus bradycardia  Will hold off on anticoagulation as patient already has multiple bruises f from her falls and is not the best anticoagulation candidate we will rate control with Lopressor if needed cardiology is consulted and echocardiogram and troponins will be ordered  As well as TSH a CTA of the lung will also be obtained to exclude PE    2. Frequent UTIs with history of pessary and fecal impaction we will repeat CT of the abdomen although its exam is normal today    3. Cerebellar ataxia she will get a physical therapy evaluation  4.   Hypotension slowly resolved likely from medication we will check CTA I Prophylaxis: Lovenox        Eusebio Chen MD  8/26/2022 12:42 AM

## 2022-08-26 NOTE — PROGRESS NOTES
Hospitalist Progress Note    Patient: Toney Henriquez MRN: [id number]  CSN: [id number]    YOB: 1945  Age: 65 y.o. Sex: female    DOA: 8/25/2022 LOS:  LOS: 0 days          Chief Complaint:  aflutter        Assessment/Plan        1. Atrial flutter with rapid ventricular response   Will hold off on anticoagulation as patient already has multiple bruises from her falls and is not the best anticoagulation candidate   Lopressor PO  Cardizem off  BP borderline  TSH is normal  CTA is neg for PE  Echo result pending     2. Frequent UTIs with history of pessary and fecal impaction   Very constipated  She wants to defer the enema and keep on laxatives for now     3. Chronic Cerebellar ataxia -PT eval    4.   Hypotension slowly resolving - likely from medication     Telemetry monitoring  Cardiology consult     Disposition :  Patient Active Problem List   Diagnosis Code    DDD (degenerative disc disease), cervical M50.30    OA (osteoarthritis) of hip M16.9    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    Acute blood loss anemia D62    DDD (degenerative disc disease), lumbar M51.36    Spondylolisthesis of lumbar region M43.16    Scoliosis of thoracolumbar spine M41.9    SIRS (systemic inflammatory response syndrome) ([address] R65.10    History of back surgery Z98.890    Atelectasis J98.11    New onset a-fib ([address] I48.91    Atrial fibrillation ([address] I48.91    Dizziness R42    Ataxia R27.0    Constipated K59.00       Subjective:  I feel better  No BM yet  Denies N/V  No abd pain      Review of systems:    Constitutional: denies fevers  Respiratory: denies SOB  Cardiovascular: denies chest pain, palpitations  Gastrointestinal: denies nausea, vomiting, diarrhea      Vital signs/Intake and Output:  Visit Vitals  BP (!) 96/53   Pulse 60   Temp 97.7 °F (36.5 °C)   Resp 16   Ht 5' (1.524 m)   Wt 54.4 kg (120 lb)   SpO2 99%   BMI 23.44 kg/m²     Current Shift:  08/26 0701 - 08/26 1900  In: 240 [P.O.:240]  Out: -   Last three shifts:  08/24 1901 - 08/26 0700  In: -   Out: 250 [Urine:250]    Exam:    General: Well developed, alert, NAD, OX3  CVS:Regular  rhythm, rose marie, no M/R/G, S1/S2 heard, no thrill  Lungs:Clear to auscultation bilaterally, no wheezes, rhonchi, or rales  Abdomen: Soft, Nontender, No distention, Normal Bowel sounds  Extremities: No C/C/E, pulses palpable 2+  Skin:normal texture and turgor, no rashes, no lesions  Neuro:grossly normal , follows commands  Psych:appropriate                Labs: Results:       Chemistry Recent Labs     08/26/22 0428 08/25/22 1935   GLU 84 97    138   K 4.3 3.8    104   CO2 27 31   BUN 14 14   CREA 0.59* 0.75   CA 9.2 9.5   AGAP 7 3   BUCR 24* 19   AP 62 74   TP 6.3* 7.3   ALB 3.6 4.1   GLOB 2.7 3.2   AGRAT 1.3 1.3      CBC w/Diff Recent Labs     08/26/22 0428 08/25/22 1935   WBC 5.2 6.5   RBC 3.52* 3.97*   HGB 11.5* 12.9   HCT 36.0 39.8    299   GRANS 71 73   LYMPH 16* 15*   EOS 2 1      Cardiac Enzymes No results for input(s): CPK, CKND1, ELEONORA in the last 72 hours. No lab exists for component: CKRMB, TROIP   Coagulation No results for input(s): PTP, INR, APTT, INREXT, INREXT in the last 72 hours. Lipid Panel No results found for: CHOL, CHOLPOCT, CHOLX, CHLST, CHOLV, 848050, HDL, HDLP, LDL, LDLC, DLDLP, 246572, VLDLC, VLDL, TGLX, TRIGL, TRIGP, TGLPOCT, CHHD, CHHDX   BNP No results for input(s): BNPP in the last 72 hours.    Liver Enzymes Recent Labs     08/26/22 0428   TP 6.3*   ALB 3.6   AP 62      Thyroid Studies Lab Results   Component Value Date/Time    TSH 1.31 08/26/2022 12:32 AM        Procedures/imaging: see electronic medical records for all procedures/Xrays and details which were not copied into this note but were reviewed prior to creation of Analisa Roberts MD

## 2022-08-26 NOTE — PROGRESS NOTES
Problem: Falls - Risk of  Goal: *Absence of Falls  Description: Document Vida Dillon Fall Risk and appropriate interventions in the flowsheet.   Outcome: Progressing Towards Goal  Note: Fall Risk Interventions:  Mobility Interventions: Bed/chair exit alarm, Patient to call before getting OOB         Medication Interventions: Evaluate medications/consider consulting pharmacy    Elimination Interventions: Bed/chair exit alarm, Call light in reach, Toileting schedule/hourly rounds    History of Falls Interventions: Bed/chair exit alarm, Investigate reason for fall, Room close to nurse's station, Evaluate medications/consider consulting pharmacy         Problem: Patient Education: Go to Patient Education Activity  Goal: Patient/Family Education  Outcome: Progressing Towards Goal

## 2022-08-26 NOTE — ROUTINE PROCESS
1900: Bedside and Verbal shift change report given to TIESHA Lord RN (oncoming nurse) by Dmitriy Vergara RN (offgoing nurse). Report included the following information SBAR, Kardex, ED Summary, Intake/Output, MAR, Recent Results, Med Rec Status, and Cardiac Rhythm NSR .        2030: Shift assessment completed per flow sheet. Pt is resting quietly with eyes wide open and chest rising and falling evenly. No signs of pain or complaints of discomfort. Will continue to monitor. 2340: Shift reassessment completed per flow sheet without change. 4495: Shift reassessment completed per flow sheet. Cardiac rhythm has changed into A-Fib but with controlled rate under 100. Notified Dr. Nita Khan of change.

## 2022-08-26 NOTE — ROUTINE PROCESS
0145: TRANSFER - IN REPORT:    Verbal report received from JOSE ROBERTO Mandel(name) on Emre Fabian  being received from ED(unit) for routine progression of care      Report consisted of patients Situation, Background, Assessment and   Recommendations(SBAR). Information from the following report(s) SBAR, Kardex, ED Summary, Intake/Output, MAR, Recent Results, Med Rec Status, and Cardiac Rhythm NSR  was reviewed with the receiving nurse. Opportunity for questions and clarification was provided. Assessment completed upon patients arrival to unit and care assumed. 0240: Dr. Wu Heads at bedside. Orders recieved    0330: Transported to CT on monitor with this RN. 0350: Transported back to 3N on monitor with this RN. 0400: pt stated she was having a hard time breathing again; vitals in normal range; placed NC 2 liters on for comfort.

## 2022-08-26 NOTE — ED PROVIDER NOTES
EMERGENCY DEPARTMENT HISTORY AND PHYSICAL EXAM    Date: 8/25/2022  Patient Name: Cathie Castillo    History of Presenting Illness     Chief Complaint   Patient presents with    Dizziness         History Provided By: Patient    Additional History (Context):   9:28 PM  Cathie Castillo is a 65 y.o. female with PMHX of early cerebellar ataxia, recurrent urinary tract infections, shakiness, fatigue who presents to the emergency department C/O dizziness. Patient returns again complaining of her same symptomatology. During cardiac screening she was found to have rapid heart rate irregularly irregular. On more specific questions asking to characterize her friends feelings feeling she describes a fluttering sensation in her chest.  She denies any chest pain shortness of breath she has no cough. Social History  Denies smoking drinking or drugs    Family History  Negative for premature heart disease or other problems. PCP: Johnny Strong MD    Current Facility-Administered Medications   Medication Dose Route Frequency Provider Last Rate Last Admin    dilTIAZem (CARDIZEM) 125 mg in dextrose 5% 125 mL infusion  5 mg/hr IntraVENous TITRATE Lia Daily MD 5 mL/hr at 08/25/22 2025 5 mg/hr at 08/25/22 2025    sodium chloride 0.9 % bolus infusion 500 mL  500 mL IntraVENous ONCE Lia Daily MD         Current Outpatient Medications   Medication Sig Dispense Refill    famotidine (PEPCID) 20 mg tablet Take 1 Tab by mouth two (2) times daily as needed for Pain or Nausea for up to 30 doses. 30 Tab 0    polyethylene glycol (MIRALAX) 17 gram/dose powder Take 17 g by mouth daily. 1 tablespoon with 8 oz of water daily 300 g 0    metoprolol tartrate (LOPRESSOR) 25 mg tablet Take 1 Tab by mouth every twelve (12) hours. 60 Tab 0    gabapentin (NEURONTIN) 100 mg capsule Take 400 mg by mouth nightly. CALCIUM CARBONATE/VITAMIN D3 (CALCIUM 500 + D PO) Take 1 Tab by mouth two (2) times a day. MULTIVIT-MIN/IRON/FOLIC/LUTEIN (CENTRUM SILVER WOMEN PO) Take  by mouth daily. ACETAMINOPHEN/DIPHENHYDRAMINE (TYLENOL PM PO) Take 2 Tabs by mouth nightly. PARoxetine (PAXIL) 20 mg tablet Take 20 mg by mouth daily. ascorbic acid (VITAMIN C) 500 mg tablet Take 500 mg by mouth daily. cholecalciferol (VITAMIN D3) 1,000 unit tablet Take 1,000 Units by mouth daily. omega-3 fatty acids-vitamin e (FISH OIL) 1,000 mg Cap Take 1 Cap by mouth daily. Past History     Past Medical History:  Past Medical History:   Diagnosis Date    Arthritis     Chronic pain     lower back    GERD (gastroesophageal reflux disease)     Ill-defined condition     Gita cerebella ataxia    Menopause     Age 47    Other ill-defined conditions(799.89)     Gita's Cerebellar Ataxia    Other ill-defined conditions(799.89)     Pessary    Psychiatric disorder     depression       Past Surgical History:  Past Surgical History:   Procedure Laterality Date    HX CERVICAL FUSION  2012    anterior    HX GI      prolapse rectum    HX HEENT      OS muscle surgery    HX ORTHOPAEDIC      Right knee ORIF    HX ORTHOPAEDIC  2014    right total hip    HX OTHER SURGICAL      repair rectal prolapse       Family History:  Family History   Problem Relation Age of Onset    Breast Cancer Maternal Aunt        Social History:  Social History     Tobacco Use    Smoking status: Never    Smokeless tobacco: Never   Substance Use Topics    Alcohol use: Yes     Alcohol/week: 0.8 standard drinks     Types: 1 Glasses of wine per week    Drug use: No       Allergies:  No Known Allergies      Review of Systems   Review of Systems   Cardiovascular:  Positive for palpitations. Neurological:  Positive for dizziness. All other systems reviewed and are negative.     Physical Exam     Vitals:    08/25/22 2025 08/25/22 2029 08/25/22 2036 08/25/22 2040   BP: 122/67 (!) 100/54 (!) 102/40 (!) 97/58   Pulse:  91 92 82   Resp:  17 13 14   Temp:       SpO2: 97% 97% 96%   Weight:       Height:         Physical Exam  Vitals and nursing note reviewed. Constitutional:       General: She is not in acute distress. Appearance: She is well-developed. She is not diaphoretic. HENT:      Head: Normocephalic and atraumatic. Eyes:      General: No scleral icterus. Extraocular Movements:      Right eye: Normal extraocular motion. Left eye: Normal extraocular motion. Conjunctiva/sclera: Conjunctivae normal.      Pupils: Pupils are equal, round, and reactive to light. Neck:      Trachea: No tracheal deviation. Cardiovascular:      Rate and Rhythm: Normal rate and regular rhythm. Heart sounds: Normal heart sounds. Pulmonary:      Effort: Pulmonary effort is normal. No respiratory distress. Breath sounds: Normal breath sounds. No stridor. Abdominal:      General: Bowel sounds are normal. There is no distension. Palpations: Abdomen is soft. Tenderness: There is no abdominal tenderness. There is no rebound. Musculoskeletal:         General: No tenderness. Normal range of motion. Cervical back: Normal range of motion and neck supple. Comments: Grossly unremarkable without abnormalities   Skin:     General: Skin is warm and dry. Capillary Refill: Capillary refill takes less than 2 seconds. Findings: No erythema or rash. Neurological:      Mental Status: She is alert and oriented to person, place, and time. GCS: GCS eye subscore is 4. GCS verbal subscore is 5. GCS motor subscore is 6. Cranial Nerves: No cranial nerve deficit. Motor: No weakness. Coordination: Coordination is intact. Comments: She has no presence of tremor or ataxia today. Psychiatric:         Attention and Perception: Attention normal.         Mood and Affect: Mood normal.         Speech: Speech normal.         Behavior: Behavior normal.         Thought Content:  Thought content normal.         Judgment: Judgment normal. Diagnostic Study Results     Labs -  Recent Results (from the past 24 hour(s))   EKG, 12 LEAD, INITIAL    Collection Time: 08/25/22  7:29 PM   Result Value Ref Range    Ventricular Rate 122 BPM    QRS Duration 80 ms    Q-T Interval 306 ms    QTC Calculation (Bezet) 436 ms    Calculated R Axis 9 degrees    Calculated T Axis 39 degrees    Diagnosis       Atrial fibrillation with rapid ventricular response  Nonspecific ST abnormality  Abnormal ECG  When compared with ECG of 17-AUG-2022 01:07,  Atrial fibrillation has replaced Sinus rhythm  Vent. rate has increased BY  51 BPM     CBC WITH AUTOMATED DIFF    Collection Time: 08/25/22  7:35 PM   Result Value Ref Range    WBC 6.5 4.6 - 13.2 K/uL    RBC 3.97 (L) 4.20 - 5.30 M/uL    HGB 12.9 12.0 - 16.0 g/dL    HCT 39.8 35.0 - 45.0 %    .3 (H) 78.0 - 100.0 FL    MCH 32.5 24.0 - 34.0 PG    MCHC 32.4 31.0 - 37.0 g/dL    RDW 12.4 11.6 - 14.5 %    PLATELET 573 383 - 827 K/uL    MPV 10.1 9.2 - 11.8 FL    NRBC 0.0 0  WBC    ABSOLUTE NRBC 0.00 0.00 - 0.01 K/uL    NEUTROPHILS 73 40 - 73 %    LYMPHOCYTES 15 (L) 21 - 52 %    MONOCYTES 9 3 - 10 %    EOSINOPHILS 1 0 - 5 %    BASOPHILS 1 0 - 2 %    IMMATURE GRANULOCYTES 0 0.0 - 0.5 %    ABS. NEUTROPHILS 4.8 1.8 - 8.0 K/UL    ABS. LYMPHOCYTES 1.0 0.9 - 3.6 K/UL    ABS. MONOCYTES 0.6 0.05 - 1.2 K/UL    ABS. EOSINOPHILS 0.1 0.0 - 0.4 K/UL    ABS. BASOPHILS 0.1 0.0 - 0.1 K/UL    ABS. IMM.  GRANS. 0.0 0.00 - 0.04 K/UL    DF AUTOMATED     METABOLIC PANEL, COMPREHENSIVE    Collection Time: 08/25/22  7:35 PM   Result Value Ref Range    Sodium 138 136 - 145 mmol/L    Potassium 3.8 3.5 - 5.5 mmol/L    Chloride 104 100 - 111 mmol/L    CO2 31 21 - 32 mmol/L    Anion gap 3 3.0 - 18 mmol/L    Glucose 97 74 - 99 mg/dL    BUN 14 7.0 - 18 MG/DL    Creatinine 0.75 0.6 - 1.3 MG/DL    BUN/Creatinine ratio 19 12 - 20      GFR est AA >60 >60 ml/min/1.73m2    GFR est non-AA >60 >60 ml/min/1.73m2    Calcium 9.5 8.5 - 10.1 MG/DL    Bilirubin, total 0.7 0.2 - 1.0 MG/DL    ALT (SGPT) 22 13 - 56 U/L    AST (SGOT) 18 10 - 38 U/L    Alk. phosphatase 74 45 - 117 U/L    Protein, total 7.3 6.4 - 8.2 g/dL    Albumin 4.1 3.4 - 5.0 g/dL    Globulin 3.2 2.0 - 4.0 g/dL    A-G Ratio 1.3 0.8 - 1.7          Radiologic Studies -   Preliminary findings  Portable chest x-ray  Normal cardiac silhouette, no pulmonary edema infiltrate or effusion. Final radiology report pending    Hasmukh Larsen MD    XR CHEST PORT    (Results Pending)     CT Results  (Last 48 hours)      None          CXR Results  (Last 48 hours)      None            Medications given in the ED-  Medications   dilTIAZem (CARDIZEM) 125 mg in dextrose 5% 125 mL infusion (5 mg/hr IntraVENous New Bag 8/25/22 2025)   sodium chloride 0.9 % bolus infusion 500 mL (has no administration in time range)   dilTIAZem (CARDIZEM) injection 10 mg (10 mg IntraVENous Given 8/25/22 2025)         Medical Decision Making   I am the first provider for this patient. I reviewed the vital signs, available nursing notes, past medical history, past surgical history, family history and social history. Vital Signs-Reviewed the patient's vital signs. Pulse Oximetry Analysis -94% on sinus rhythm    Cardiac Monitor:  Rate: 131 bpm  Rhythm: A. fib with RVR    EKG #1 interpretation: (Preliminary)  7:29 PM    Atrial fibrillation with RVR, nonspecific ST changes, QTC is 436. EKG read by Hasmukh Larsen MD    EKG #2 interpretation: (Preliminary)  12:31 AM    Normal sinus rhythm with subtle respiratory variation, rate 63, QTC is 413. ST segments are unremarkable. EKG read by Hasmukh Larsen MD    Records Reviewed: NURSING NOTES AND PREVIOUS MEDICAL RECORDS    Provider Notes (Medical Decision Making):   Patient with chronic right cerebellar ataxia and recurrent complaints of dizziness lightheadedness with near falls. The first time I saw her computer systems were down could not adequately review of her charts.   She had been seen before and after recurrence again this time in A. fib. When I was completing her chart during computer downtime we discovered she had a cardiac arrhythmia dating back to about 2010 2011. They were also followed for serial digit levels. She has breakthrough A. fib here which responded easily to medications however she became borderline hypotensive requiring pulse pressure. She feels significantly better after treatment for A. fib. Reviewed the case with cardiology as well as hospitalist for admission for close observation. Her urinary symptoms are likely associated with chronic condition and probably subtle colonization and not contributing to her symptoms. Procedures:  Procedures    ED Course:   9:28 PM: Initial assessment performed. The patients presenting problems have been discussed, and they are in agreement with the care plan formulated and outlined with them. I have encouraged them to ask questions as they arise throughout their visit. CONSULT NOTE:   10:39 PM  Keanu Walters MD   spoke with Dr. Edwin Erickson  Specialty: Cardiology  Discussed pt's hx, disposition, and available diagnostic and imaging results  over the telephone. Reviewed care plans. Consulting physician agrees with plans as outlined. Agrees with current management. Patient is probably appropriate for admission given paroxysmal A. fib widened pulse pressure. Would not start anticoagulation or long-term changes in rate control as she was abdomen easy to treat but pending findings from further work-up. .      CONSULT NOTE:   12:45 AM  Keanu Walters MD   spoke with Dr. Ren Sandoval  Specialty: Hospitalist  Discussed pt's hx, disposition, and available diagnostic and imaging results  over the telephone. Reviewed care plans. Consulting physician agrees with plans as outlined. Agrees with current management, request we finished scanning her abdomen and chest for other likely infectious contributing cause. .        Diagnosis and Disposition     Critical Care Time: 55 minutes  CRITICAL CARE NOTE:  1:25 AM  I have spent 55 minutes of critical care time involved in lab review, consultations with specialist, family decision-making, and documentation. During this entire length of time I was immediately available to the patient. Critical Care: The reason for providing this level of medical care for this critically ill patient was due a critical illness that impaired one or more vital organ systems such that there was a high probability of imminent or life threatening deterioration in the patients condition. This care involved high complexity decision making to assess, manipulate, and support vital system functions, to treat this degreee vital organ system failure and to prevent further life threatening deterioration of the patients condition. Core Measures:  For Hospitalized Patients:    1. Hospitalization Decision Time:  The decision to hospitalize the patient was made by Kiersten Calvo MD   at 7:45 PM on 8/25/2022    2. Aspirin: Aspirin was not given because the patient did not present with a stroke at the time of their Emergency Department evaluation    1:15 AM   Patient is being admitted to the hospital by Dr. Brooks Garcia. The results of their tests and reasons for their admission have been discussed with them and/or available family. They convey agreement and understanding for the need to be admitted and for their admission diagnosis. CONDITIONS ON ADMISSION:  Sepsis is not present at the time of admission. Deep Vein Thrombosis is not present at the time of admission. Thrombosis is not present at the time of admission. Urinary Tract Infection is not present at the time of admission. Pneumonia is not present at the time of admission. MRSA is not present at the time of admission. Wound infection is not present at the time of admission. Pressure Ulcer is not present at the time of admission.       CLINICAL IMPRESSION:    1. Atrial fibrillation with RVR ([facility]    2. Dizziness    3. Gita's cerebellar ataxia ([facility]          _______________________________    This note was partially transcribed via voice recognition software. Although efforts have been made to catch any discrepancies, it may contain sound alike words, grammatical errors, or nonsensical words.

## 2022-08-27 LAB
ALBUMIN SERPL-MCNC: 3.2 G/DL (ref 3.4–5)
ALBUMIN/GLOB SERPL: 1.1 {RATIO} (ref 0.8–1.7)
ALP SERPL-CCNC: 61 U/L (ref 45–117)
ALT SERPL-CCNC: 21 U/L (ref 13–56)
ANION GAP SERPL CALC-SCNC: 7 MMOL/L (ref 3–18)
AST SERPL-CCNC: 17 U/L (ref 10–38)
BASOPHILS # BLD: 0 K/UL (ref 0–0.1)
BASOPHILS NFR BLD: 1 % (ref 0–2)
BILIRUB SERPL-MCNC: 0.5 MG/DL (ref 0.2–1)
BUN SERPL-MCNC: 11 MG/DL (ref 7–18)
BUN/CREAT SERPL: 23 (ref 12–20)
CALCIUM SERPL-MCNC: 9 MG/DL (ref 8.5–10.1)
CHLORIDE SERPL-SCNC: 110 MMOL/L (ref 100–111)
CO2 SERPL-SCNC: 27 MMOL/L (ref 21–32)
CREAT SERPL-MCNC: 0.48 MG/DL (ref 0.6–1.3)
DIFFERENTIAL METHOD BLD: ABNORMAL
EOSINOPHIL # BLD: 0.2 K/UL (ref 0–0.4)
EOSINOPHIL NFR BLD: 4 % (ref 0–5)
ERYTHROCYTE [DISTWIDTH] IN BLOOD BY AUTOMATED COUNT: 12.6 % (ref 11.6–14.5)
GLOBULIN SER CALC-MCNC: 2.8 G/DL (ref 2–4)
GLUCOSE SERPL-MCNC: 94 MG/DL (ref 74–99)
HCT VFR BLD AUTO: 36.7 % (ref 35–45)
HGB BLD-MCNC: 11.7 G/DL (ref 12–16)
IMM GRANULOCYTES # BLD AUTO: 0 K/UL (ref 0–0.04)
IMM GRANULOCYTES NFR BLD AUTO: 0 % (ref 0–0.5)
LYMPHOCYTES # BLD: 0.8 K/UL (ref 0.9–3.6)
LYMPHOCYTES NFR BLD: 17 % (ref 21–52)
MAGNESIUM SERPL-MCNC: 2.1 MG/DL (ref 1.6–2.6)
MCH RBC QN AUTO: 32.7 PG (ref 24–34)
MCHC RBC AUTO-ENTMCNC: 31.9 G/DL (ref 31–37)
MCV RBC AUTO: 102.5 FL (ref 78–100)
MONOCYTES # BLD: 0.6 K/UL (ref 0.05–1.2)
MONOCYTES NFR BLD: 11 % (ref 3–10)
NEUTS SEG # BLD: 3.3 K/UL (ref 1.8–8)
NEUTS SEG NFR BLD: 67 % (ref 40–73)
NRBC # BLD: 0 K/UL (ref 0–0.01)
NRBC BLD-RTO: 0 PER 100 WBC
PLATELET # BLD AUTO: 236 K/UL (ref 135–420)
PMV BLD AUTO: 9.8 FL (ref 9.2–11.8)
POTASSIUM SERPL-SCNC: 4 MMOL/L (ref 3.5–5.5)
PROT SERPL-MCNC: 6 G/DL (ref 6.4–8.2)
RBC # BLD AUTO: 3.58 M/UL (ref 4.2–5.3)
SODIUM SERPL-SCNC: 144 MMOL/L (ref 136–145)
WBC # BLD AUTO: 4.9 K/UL (ref 4.6–13.2)

## 2022-08-27 PROCEDURE — 74011250637 HC RX REV CODE- 250/637: Performed by: INTERNAL MEDICINE

## 2022-08-27 PROCEDURE — P9047 ALBUMIN (HUMAN), 25%, 50ML: HCPCS | Performed by: FAMILY MEDICINE

## 2022-08-27 PROCEDURE — 74011250637 HC RX REV CODE- 250/637: Performed by: HOSPITALIST

## 2022-08-27 PROCEDURE — 80053 COMPREHEN METABOLIC PANEL: CPT

## 2022-08-27 PROCEDURE — 77010033678 HC OXYGEN DAILY

## 2022-08-27 PROCEDURE — 65270000046 HC RM TELEMETRY

## 2022-08-27 PROCEDURE — 36415 COLL VENOUS BLD VENIPUNCTURE: CPT

## 2022-08-27 PROCEDURE — C9113 INJ PANTOPRAZOLE SODIUM, VIA: HCPCS | Performed by: INTERNAL MEDICINE

## 2022-08-27 PROCEDURE — 83735 ASSAY OF MAGNESIUM: CPT

## 2022-08-27 PROCEDURE — 74011250636 HC RX REV CODE- 250/636: Performed by: FAMILY MEDICINE

## 2022-08-27 PROCEDURE — 74011250636 HC RX REV CODE- 250/636: Performed by: INTERNAL MEDICINE

## 2022-08-27 PROCEDURE — 74011000250 HC RX REV CODE- 250: Performed by: INTERNAL MEDICINE

## 2022-08-27 PROCEDURE — 85025 COMPLETE CBC W/AUTO DIFF WBC: CPT

## 2022-08-27 RX ORDER — METOPROLOL TARTRATE 5 MG/5ML
2.5 INJECTION INTRAVENOUS ONCE
Status: COMPLETED | OUTPATIENT
Start: 2022-08-27 | End: 2022-08-27

## 2022-08-27 RX ORDER — ALBUMIN HUMAN 250 G/1000ML
25 SOLUTION INTRAVENOUS EVERY 6 HOURS
Status: COMPLETED | OUTPATIENT
Start: 2022-08-27 | End: 2022-08-27

## 2022-08-27 RX ADMIN — DIGOXIN 0.12 MG: 125 TABLET ORAL at 08:30

## 2022-08-27 RX ADMIN — SODIUM CHLORIDE, PRESERVATIVE FREE 10 ML: 5 INJECTION INTRAVENOUS at 05:59

## 2022-08-27 RX ADMIN — GABAPENTIN 400 MG: 400 CAPSULE ORAL at 20:56

## 2022-08-27 RX ADMIN — Medication 1 TABLET: at 08:30

## 2022-08-27 RX ADMIN — ALBUMIN (HUMAN) 25 G: 0.25 INJECTION, SOLUTION INTRAVENOUS at 11:14

## 2022-08-27 RX ADMIN — ENOXAPARIN SODIUM 40 MG: 100 INJECTION SUBCUTANEOUS at 08:30

## 2022-08-27 RX ADMIN — SODIUM CHLORIDE 40 MG: 9 INJECTION, SOLUTION INTRAMUSCULAR; INTRAVENOUS; SUBCUTANEOUS at 00:29

## 2022-08-27 RX ADMIN — GABAPENTIN 400 MG: 400 CAPSULE ORAL at 00:29

## 2022-08-27 RX ADMIN — SODIUM CHLORIDE, PRESERVATIVE FREE 10 ML: 5 INJECTION INTRAVENOUS at 13:29

## 2022-08-27 RX ADMIN — ALBUMIN (HUMAN) 25 G: 0.25 INJECTION, SOLUTION INTRAVENOUS at 18:05

## 2022-08-27 RX ADMIN — POLYETHYLENE GLYCOL 3350 17 G: 17 POWDER, FOR SOLUTION ORAL at 08:29

## 2022-08-27 RX ADMIN — Medication 500 MG: at 08:30

## 2022-08-27 RX ADMIN — METOPROLOL TARTRATE 2.5 MG: 5 INJECTION INTRAVENOUS at 05:57

## 2022-08-27 NOTE — PROGRESS NOTES
Problem: Falls - Risk of  Goal: *Absence of Falls  Description: Document Ivy Suarez Fall Risk and appropriate interventions in the flowsheet.   Outcome: Progressing Towards Goal  Note: Fall Risk Interventions:  Mobility Interventions: Assess mobility with egress test, Bed/chair exit alarm         Medication Interventions: Bed/chair exit alarm, Patient to call before getting OOB, Teach patient to arise slowly, Evaluate medications/consider consulting pharmacy    Elimination Interventions: Bed/chair exit alarm, Call light in reach, Toilet paper/wipes in reach, Toileting schedule/hourly rounds    History of Falls Interventions: Bed/chair exit alarm, Evaluate medications/consider consulting pharmacy, Room close to nurse's station         Problem: Patient Education: Go to Patient Education Activity  Goal: Patient/Family Education  Outcome: Progressing Towards Goal     Problem: General Medical Care Plan  Goal: *Vital signs within specified parameters  Outcome: Progressing Towards Goal  Goal: *Labs within defined limits  Outcome: Progressing Towards Goal  Goal: *Absence of infection signs and symptoms  Outcome: Progressing Towards Goal  Goal: *Optimal pain control at patient's stated goal  Outcome: Progressing Towards Goal  Goal: *Skin integrity maintained  Outcome: Progressing Towards Goal  Goal: *Fluid volume balance  Outcome: Progressing Towards Goal  Goal: *Optimize nutritional status  Outcome: Progressing Towards Goal  Goal: *Anxiety reduced or absent  Outcome: Progressing Towards Goal  Goal: *Progressive mobility and function (eg: ADL's)  Outcome: Progressing Towards Goal     Problem: Patient Education: Go to Patient Education Activity  Goal: Patient/Family Education  Outcome: Progressing Towards Goal     Problem: Pain  Goal: *Control of Pain  Outcome: Progressing Towards Goal  Goal: *PALLIATIVE CARE:  Alleviation of Pain  Outcome: Progressing Towards Goal     Problem: Patient Education: Go to Patient Education Activity  Goal: Patient/Family Education  Outcome: Progressing Towards Goal

## 2022-08-27 NOTE — PROGRESS NOTES
Cardiology Progress Note        Patient: Magalys Cardenas        Sex: female          DOA: 8/25/2022  YOB: 1945      Age:  65 y.o.        LOS:  LOS: 1 day . 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    Acute blood loss anemia D62    DDD (degenerative disc disease), lumbar M51.36    Spondylolisthesis of lumbar region M43.16    Scoliosis of thoracolumbar spine M41.9    SIRS (systemic inflammatory response syndrome) ([address] R65.10    History of back surgery Z98.890    Atelectasis J98.11    New onset a-fib ([address] I48.91    Atrial fibrillation ([address] I48.91    Dizziness R42    Ataxia R27.0    Constipated K59.00       Paroxysmal atrial fibrillation CHADsVASc score is 3.0    Echocardiogram revealed       Left Ventricle: Normal left ventricular systolic function with a visually estimated EF of 55 - 60%. Left ventricle size is normal. Normal wall thickness. Normal wall motion. Normal diastolic function. Aortic Valve: Not well visualized. Mildly calcified cusp. Mild to moderate regurgitation. No stenosis. Pulmonary Arteries: Pulmonary artery was not well visualized. The estimated PASP is 25 mmHg. Telemetry monitor revealed episode of paroxysmal atrial fibrillation but patient was not symptomatic    Plan:     Continue digoxin   Patient is candidate for long-term oral anticoagulation but due to frequent falls will not start any anticoagulation therapy. Start aspirin 81 mg by mouth once a day   Plan discussed with patient and family member and they verbally understood and agreed. Subjective:    cc:    Denies any palpitation or shortness of breath      REVIEW OF SYSTEMS:     General: No fevers or chills.   Cardiovascular: No chest pain,No palpitations, No orthopnea, No PND, No leg swelling, No claudication  Pulmonary: No  dyspnea. Gastrointestinal: No nausea, vomiting, bleeding  Neurology: No Dizziness    Objective:      Visit Vitals  BP (!) 89/48 (BP 1 Location: Right arm)   Pulse 63   Temp 98.2 °F (36.8 °C)   Resp 18   Ht 5' (1.524 m)   Wt 51.1 kg (112 lb 9.6 oz)   SpO2 100%   BMI 21.99 kg/m²     Body mass index is 21.99 kg/m². Physical Exam:  General Appearance: Comfortable, not using accessory muscles of respiration. HEENT: FREDDY. HEAD: Atraumatic  NECK: No JVD, no thyroidomeglay. CAROTIDS: no bruit  LUNGS: Clear bilaterally. HEART: S1+S2 audible, no murmur, no pericardial rub. ABD: Non-tender, BS Audible    EXT: No edema, and no cyanosis. VASCULAR EXAM: Pulses are intact. PSYCHIATRIC EXAM: Mood is appropriate. MUSCULOSKELETAL: Grossly no joint deformity.   NEUROLOGICAL: AAO times 3, No motor and sensory deficit    Medication:  Current Facility-Administered Medications   Medication Dose Route Frequency    albumin human 25% (BUMINATE) solution 25 g  25 g IntraVENous Q6H    sodium chloride (NS) flush 5-40 mL  5-40 mL IntraVENous Q8H    sodium chloride (NS) flush 5-40 mL  5-40 mL IntraVENous PRN    acetaminophen (TYLENOL) tablet 650 mg  650 mg Oral Q6H PRN    Or    acetaminophen (TYLENOL) suppository 650 mg  650 mg Rectal Q6H PRN    polyethylene glycol (MIRALAX) packet 17 g  17 g Oral DAILY PRN    ondansetron (ZOFRAN ODT) tablet 4 mg  4 mg Oral Q8H PRN    Or    ondansetron (ZOFRAN) injection 4 mg  4 mg IntraVENous Q6H PRN    enoxaparin (LOVENOX) injection 40 mg  40 mg SubCUTAneous DAILY    pantoprazole (PROTONIX) 40 mg in 0.9% sodium chloride 10 mL injection  40 mg IntraVENous Q24H    gabapentin (NEURONTIN) capsule 400 mg  400 mg Oral QHS    ascorbic acid (vitamin C) (VITAMIN C) tablet 500 mg  500 mg Oral DAILY    polyethylene glycol (MIRALAX) packet 17 g  17 g Oral BID    senna-docusate (PERICOLACE) 8.6-50 mg per tablet 1 Tablet  1 Tablet Oral DAILY    bisacodyL (DULCOLAX) suppository 10 mg  10 mg Rectal DAILY PRN    digoxin (LANOXIN) tablet 0.125 mg  0.125 mg Oral DAILY               Lab/Data Reviewed:       Recent Labs     08/27/22  0640 08/26/22 0428 08/25/22 1935   WBC 4.9 5.2 6.5   HGB 11.7* 11.5* 12.9   HCT 36.7 36.0 39.8    258 299     Recent Labs     08/27/22  0640 08/26/22 0428 08/25/22 1935    139 138   K 4.0 4.3 3.8    105 104   CO2 27 27 31   GLU 94 84 97   BUN 11 14 14   CREA 0.48* 0.59* 0.75   CA 9.0 9.2 9.5       Signed By: Merrill Morales MD     August 27, 2022

## 2022-08-27 NOTE — PROGRESS NOTES
0715 Bedside shift change report given to Josie Vieira RN (oncoming nurse) by Nimo Bower RN (offgoing nurse). Report included the following information SBAR, Kardex, Intake/Output, MAR, and Recent Results. 1927 Bedside shift change report given to [facility] (oncoming nurse) by Rell Wetzel (offgoing nurse). Report included the following information SBAR, Kardex, Intake/Output, MAR, and Recent Results.

## 2022-08-27 NOTE — CONSULTS
[facility] Consult Note      Patient: Elliott Ribeiro MRN: [id number]  SSN: [id number]    YOB: 1945  Age: 65 y.o. Sex: female    Date of Consultation: 08/25/2022  Referring Physician: Keyon Diane  Reason for Consultation: Atrial fibrillation    Chief complain:  dizziness, palpitation and shortness of breath    HPI:  71-year-old female brought to emergency room with complaining of dizziness, palpitation and shortness of breath. On ER arrival she was in atrial fibrillation with rapid ventricular rate. She was given IV Cardizem and converted back into sinus rhythm and was bradycardic. She is complaining of shortness of breath on exertion. She has a frequent falls due to cerebellar ataxia. She is complaining of frequent episodes of palpitation sometimes lasts for whole day at home. She had 1 episode of palpitation after she came to emergency room. Denies any syncope. Denies any orthopnea or PND. Denies any chest pain on exertion. Denies any smoking or alcohol abuse. Cardiology consult called for evaluation of atrial fibrillation.   Patient was diagnosed with paroxysmal atrial fibrillation in the past.    Past Medical History:   Diagnosis Date    Arthritis     Chronic pain     lower back    GERD (gastroesophageal reflux disease)     Ill-defined condition     Gita cerebella ataxia    Menopause     Age 47    Other ill-defined conditions(799.89)     Gita's Cerebellar Ataxia    Other ill-defined conditions(799.89)     Pessary    Psychiatric disorder     depression     Past Surgical History:   Procedure Laterality Date    HX CERVICAL FUSION  2012    anterior    HX GI      prolapse rectum    HX HEENT      OS muscle surgery    HX ORTHOPAEDIC      Right knee ORIF    HX ORTHOPAEDIC  2014    right total hip    HX OTHER SURGICAL      repair rectal prolapse     Current Facility-Administered Medications   Medication Dose Route Frequency    sodium chloride (NS) flush 5-40 mL  5-40 mL IntraVENous Q8H    sodium chloride (NS) flush 5-40 mL  5-40 mL IntraVENous PRN    acetaminophen (TYLENOL) tablet 650 mg  650 mg Oral Q6H PRN    Or    acetaminophen (TYLENOL) suppository 650 mg  650 mg Rectal Q6H PRN    polyethylene glycol (MIRALAX) packet 17 g  17 g Oral DAILY PRN    ondansetron (ZOFRAN ODT) tablet 4 mg  4 mg Oral Q8H PRN    Or    ondansetron (ZOFRAN) injection 4 mg  4 mg IntraVENous Q6H PRN    enoxaparin (LOVENOX) injection 40 mg  40 mg SubCUTAneous DAILY    pantoprazole (PROTONIX) 40 mg in 0.9% sodium chloride 10 mL injection  40 mg IntraVENous Q24H    0.9% sodium chloride infusion  75 mL/hr IntraVENous CONTINUOUS    gabapentin (NEURONTIN) capsule 400 mg  400 mg Oral QHS    ascorbic acid (vitamin C) (VITAMIN C) tablet 500 mg  500 mg Oral DAILY    [Held by provider] metoprolol tartrate (LOPRESSOR) tablet 25 mg  25 mg Oral Q12H    polyethylene glycol (MIRALAX) packet 17 g  17 g Oral BID    senna-docusate (PERICOLACE) 8.6-50 mg per tablet 1 Tablet  1 Tablet Oral DAILY    bisacodyL (DULCOLAX) suppository 10 mg  10 mg Rectal DAILY PRN    [START ON 8/27/2022] digoxin (LANOXIN) tablet 0.125 mg  0.125 mg Oral DAILY       Allergies and Intolerances:   No Known Allergies    Family History:   Family History   Problem Relation Age of Onset    Breast Cancer Maternal Aunt        Social History:   She  reports that she has never smoked. She has never used smokeless tobacco.  She  reports current alcohol use of about 0.8 standard drinks per week. Review of Systems:     Gen: No fever, chills, malaise, weight loss/gain. Heent: No headache, rhinorrhea, epistaxis, ear pain, hearing loss, sinus pain, neck pain/stiffness, sore throat. Heart: No chest pain, Positive palpitations, shortness of breath No pnd, or orthopnea. Resp: No cough, hemoptysis, wheezing and dyspnea  GI: No nausea, vomiting, diarrhea, constipation, melena or hematochezia. : No urinary obstruction, dysuria or hematuria.    Derm: No rash, new skin lesion or pruritis. Musc/skeletal: positive bone or joint complains. Vasc: No edema, cyanosis or claudication. Endo: No heat/cold intolerance, no polyuria,polydipsia or polyphagia. Neuro: No unilateral weakness, numbness, tingling. No seizures. Heme: No easy bruising or bleeding. Physical:   Patient Vitals for the past 6 hrs:   BP   08/26/22 2020 (!) 96/55   08/26/22 2019 (!) 89/43   08/26/22 2018 (!) 99/49         Exam:   General Appearance: Comfortable, not using accessory muscles of respiration. HEENT: FREDDY. HEAD: Atraumatic  NECK: No JVD, no thyroidomeglay. CAROTIDS: No bruit  LUNGS: Clear bilaterally. HEART: S1+S2 audible, no murmur, no pericardial rub. ABD: Non-tender, BS Audible    EXT: No edema, and no cyanosis. VASCULAR EXAM: Pulses are intact. PSYCHIATRIC EXAM: Mood is appropriate. MUSCULOSKELETAL: Grossly no joint deformity.   NEUROLOGICAL: AAO times 3, Motor and sensory sytem intact     Review of Data:   LABS:   Lab Results   Component Value Date/Time    WBC 5.2 08/26/2022 04:28 AM    HGB 11.5 (L) 08/26/2022 04:28 AM    HCT 36.0 08/26/2022 04:28 AM    PLATELET 738 12/25/8990 04:28 AM     Lab Results   Component Value Date/Time    Sodium 139 08/26/2022 04:28 AM    Potassium 4.3 08/26/2022 04:28 AM    Chloride 105 08/26/2022 04:28 AM    CO2 27 08/26/2022 04:28 AM    Glucose 84 08/26/2022 04:28 AM    BUN 14 08/26/2022 04:28 AM    Creatinine 0.59 (L) 08/26/2022 04:28 AM     No results found for: CHOL, CHOLX, CHLST, CHOLV, HDL, HDLP, LDL, LDLC, DLDLP, TGLX, TRIGL, TRIGP  No components found for: GPT  No results found for: HBA1C, [id number], [id number]      Cardiology Procedures:   Results for orders placed or performed during the hospital encounter of 08/25/22   EKG, 12 LEAD, INITIAL   Result Value Ref Range    Ventricular Rate 122 BPM    QRS Duration 80 ms    Q-T Interval 306 ms    QTC Calculation (Bezet) 436 ms    Calculated R Axis 9 degrees    Calculated T Axis 39 degrees    Diagnosis Atrial fibrillation with rapid ventricular response  Nonspecific ST abnormality  Abnormal ECG               Impression / Plan:    Patient Active Problem List   Diagnosis Code    DDD (degenerative disc disease), cervical M50.30    OA (osteoarthritis) of hip M16.9    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    Acute blood loss anemia D62    DDD (degenerative disc disease), lumbar M51.36    Spondylolisthesis of lumbar region M43.16    Scoliosis of thoracolumbar spine M41.9    SIRS (systemic inflammatory response syndrome) ([facility]) R65.10    History of back surgery Z98.890    Atelectasis J98.11    New onset a-fib ([address] I48.91    Atrial fibrillation ([address] I48.91    Dizziness R42    Ataxia R27.0    Constipated K59.00     Paroxysmal atrial fibrillation CHADsVASc score 1     59-year-old female with known history of paroxysmal atrial fibrillation diagnosed in 2012 came with dizziness, shortness of breath and palpitation. She was in atrial fibrillation with rapid ventricular rate she converted back into sinus rhythm. Her blood pressure is borderline. Discontinue metoprolol tartrate. Start digoxin 0.125 mg by mouth once a day. Echocardiogram.    Patient is candidate for long-term oral anticoagulation therapy but due to frequent falls will not start any anticoagulation therapy. Plan discussed with patient and family member and they verbally understood and agreed. Continue telemetry monitoring.     Further cardiac workup as clinically indicated          Signed By: Nancy Field MD     August 26, 2022

## 2022-08-27 NOTE — PROGRESS NOTES
Hospitalist Progress Note    Patient: Natty Nicholas MRN: [id number]  CSN: [id number]    YOB: 1945  Age: 65 y.o. Sex: female    DOA: 8/25/2022 LOS:  LOS: 1 day          Chief Complaint:  aflutter        Assessment/Plan     1. Atrial flutter with rapid ventricular response   Will hold off on anticoagulation as patient already has multiple bruises from her falls and is not the best anticoagulation candidate   Lopressor PO held for low Bps, digoxin added by Dr. Ale Diana off  BP borderline and some quite low BPs  TSH is normal  CTA is neg for PE  Echo: Normal left ventricular systolic function with a visually estimated EF of 55 - 60%     2. Frequent UTIs with history of pessary and fecal impaction   Very constipated  She wants to defer the enema and keep on laxatives for now     3. Chronic Cerebellar ataxia -PT eval    4. Hypotension -still having low Bps, will add a few doses of albumin     5.  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    Acute blood loss anemia D62    DDD (degenerative disc disease), lumbar M51.36    Spondylolisthesis of lumbar region M43.16    Scoliosis of thoracolumbar spine M41.9    SIRS (systemic inflammatory response syndrome) (HCC) R65.10    History of back surgery Z98.890    Atelectasis J98.11    New onset a-fib ([facility] I48.91    Atrial fibrillation (HCC) I48.91    Dizziness R42    Ataxia R27.0    Constipated K59.00       Subjective:    Sitting up with aide, feeling slightly dizzy  No sob, cp  Having a BM currently     Review of systems:    Constitutional: denies fevers  Respiratory: denies SOB  Cardiovascular: denies chest pain, palpitations  Gastrointestinal: denies nausea, vomiting, diarrhea      Vital signs/Intake and Output:  Visit Vitals  BP (!) 92/43   Pulse (!) 54   Temp 98 °F (36.7 °C)   Resp 16   Ht 5' (1.524 m)   Wt 51.1 kg (112 lb 9.6 oz)   SpO2 100%   BMI 21.99 kg/m²     Current Shift:  No intake/output data recorded. Last three shifts:  08/25 1901 - 08/27 0700  In: 240 [P.O.:240]  Out: 750 [Urine:750]    Exam:    General: Well developed, alert, NAD, OX3  CVS:Regular  rhythm, rose marie, no M/R/G, S1/S2 heard, no thrill  Lungs:Clear to auscultation bilaterally, no wheezes, rhonchi, or rales  Abdomen: Soft, Nontender, No distention, Normal Bowel sounds  Extremities: No C/C/E, pulses palpable 2+  Skin:normal texture and turgor, no rashes, no lesions  Neuro:grossly normal , follows commands  Psych:appropriate                Labs: Results:       Chemistry Recent Labs     08/27/22  0640 08/26/22 0428 08/25/22 1935   GLU 94 84 97    139 138   K 4.0 4.3 3.8    105 104   CO2 27 27 31   BUN 11 14 14   CREA 0.48* 0.59* 0.75   CA 9.0 9.2 9.5   AGAP 7 7 3   BUCR 23* 24* 19   AP 61 62 74   TP 6.0* 6.3* 7.3   ALB 3.2* 3.6 4.1   GLOB 2.8 2.7 3.2   AGRAT 1.1 1.3 1.3        CBC w/Diff Recent Labs     08/27/22  0640 08/26/22 0428 08/25/22 1935   WBC 4.9 5.2 6.5   RBC 3.58* 3.52* 3.97*   HGB 11.7* 11.5* 12.9   HCT 36.7 36.0 39.8    258 299   GRANS 67 71 73   LYMPH 17* 16* 15*   EOS 4 2 1        Cardiac Enzymes No results for input(s): CPK, CKND1, ELEONORA in the last 72 hours. No lab exists for component: CKRMB, TROIP   Coagulation No results for input(s): PTP, INR, APTT, INREXT, INREXT in the last 72 hours. Lipid Panel No results found for: CHOL, CHOLPOCT, CHOLX, CHLST, CHOLV, 673010, HDL, HDLP, LDL, LDLC, DLDLP, 913643, VLDLC, VLDL, TGLX, TRIGL, TRIGP, TGLPOCT, CHHD, CHHDX   BNP No results for input(s): BNPP in the last 72 hours.    Liver Enzymes Recent Labs     08/27/22  0640   TP 6.0*   ALB 3.2*   AP 61        Thyroid Studies Lab Results   Component Value Date/Time    TSH 1.31 08/26/2022 12:32 AM        Procedures/imaging: see electronic medical records for all procedures/Xrays and details which were not copied into this note but were reviewed prior to creation of Molly Zhou MD

## 2022-08-27 NOTE — PROGRESS NOTES
Problem: Falls - Risk of  Goal: *Absence of Falls  Description: Document Bard Parkinson Fall Risk and appropriate interventions in the flowsheet.   Outcome: Progressing Towards Goal  Note: Fall Risk Interventions:  Mobility Interventions: Assess mobility with egress test, Bed/chair exit alarm, OT consult for ADLs, Patient to call before getting OOB, PT Consult for mobility concerns, PT Consult for assist device competence         Medication Interventions: Assess postural VS orthostatic hypotension, Bed/chair exit alarm, Patient to call before getting OOB, Teach patient to arise slowly    Elimination Interventions: Call light in reach, Elevated toilet seat, Patient to call for help with toileting needs    History of Falls Interventions: Consult care management for discharge planning, Utilize gait belt for transfer/ambulation, Vital signs minimum Q4HRs X 24 hrs (comment for end date), Room close to nurse's station         Problem: Patient Education: Go to Patient Education Activity  Goal: Patient/Family Education  Outcome: Progressing Towards Goal     Problem: General Medical Care Plan  Goal: *Vital signs within specified parameters  Outcome: Progressing Towards Goal  Goal: *Labs within defined limits  Outcome: Progressing Towards Goal  Goal: *Absence of infection signs and symptoms  Outcome: Progressing Towards Goal  Goal: *Optimal pain control at patient's stated goal  Outcome: Progressing Towards Goal  Goal: *Skin integrity maintained  Outcome: Progressing Towards Goal  Goal: *Fluid volume balance  Outcome: Progressing Towards Goal  Goal: *Optimize nutritional status  Outcome: Progressing Towards Goal  Goal: *Anxiety reduced or absent  Outcome: Progressing Towards Goal  Goal: *Progressive mobility and function (eg: ADL's)  Outcome: Progressing Towards Goal     Problem: Patient Education: Go to Patient Education Activity  Goal: Patient/Family Education  Outcome: Progressing Towards Goal     Problem: Pain  Goal: *Control of Pain  Outcome: Progressing Towards Goal  Goal: *PALLIATIVE CARE:  Alleviation of Pain  Outcome: Progressing Towards Goal     Problem: Patient Education: Go to Patient Education Activity  Goal: Patient/Family Education  Outcome: Progressing Towards Goal

## 2022-08-28 LAB
ALBUMIN SERPL-MCNC: 3.7 G/DL (ref 3.4–5)
ALBUMIN/GLOB SERPL: 1.4 {RATIO} (ref 0.8–1.7)
ALP SERPL-CCNC: 54 U/L (ref 45–117)
ALT SERPL-CCNC: 22 U/L (ref 13–56)
ANION GAP SERPL CALC-SCNC: 3 MMOL/L (ref 3–18)
AST SERPL-CCNC: 17 U/L (ref 10–38)
BASOPHILS # BLD: 0 K/UL (ref 0–0.1)
BASOPHILS NFR BLD: 1 % (ref 0–2)
BILIRUB SERPL-MCNC: 0.4 MG/DL (ref 0.2–1)
BUN SERPL-MCNC: 18 MG/DL (ref 7–18)
BUN/CREAT SERPL: 35 (ref 12–20)
CALCIUM SERPL-MCNC: 8.9 MG/DL (ref 8.5–10.1)
CHLORIDE SERPL-SCNC: 109 MMOL/L (ref 100–111)
CO2 SERPL-SCNC: 28 MMOL/L (ref 21–32)
CREAT SERPL-MCNC: 0.52 MG/DL (ref 0.6–1.3)
DIFFERENTIAL METHOD BLD: ABNORMAL
EOSINOPHIL # BLD: 0.2 K/UL (ref 0–0.4)
EOSINOPHIL NFR BLD: 4 % (ref 0–5)
ERYTHROCYTE [DISTWIDTH] IN BLOOD BY AUTOMATED COUNT: 12.5 % (ref 11.6–14.5)
GLOBULIN SER CALC-MCNC: 2.7 G/DL (ref 2–4)
GLUCOSE SERPL-MCNC: 99 MG/DL (ref 74–99)
HCT VFR BLD AUTO: 33 % (ref 35–45)
HGB BLD-MCNC: 10.4 G/DL (ref 12–16)
IMM GRANULOCYTES # BLD AUTO: 0 K/UL (ref 0–0.04)
IMM GRANULOCYTES NFR BLD AUTO: 0 % (ref 0–0.5)
LYMPHOCYTES # BLD: 1.1 K/UL (ref 0.9–3.6)
LYMPHOCYTES NFR BLD: 19 % (ref 21–52)
MAGNESIUM SERPL-MCNC: 2.1 MG/DL (ref 1.6–2.6)
MCH RBC QN AUTO: 32.8 PG (ref 24–34)
MCHC RBC AUTO-ENTMCNC: 31.5 G/DL (ref 31–37)
MCV RBC AUTO: 104.1 FL (ref 78–100)
MONOCYTES # BLD: 0.6 K/UL (ref 0.05–1.2)
MONOCYTES NFR BLD: 10 % (ref 3–10)
NEUTS SEG # BLD: 3.9 K/UL (ref 1.8–8)
NEUTS SEG NFR BLD: 67 % (ref 40–73)
NRBC # BLD: 0 K/UL (ref 0–0.01)
NRBC BLD-RTO: 0 PER 100 WBC
PLATELET # BLD AUTO: 196 K/UL (ref 135–420)
PMV BLD AUTO: 10.1 FL (ref 9.2–11.8)
POTASSIUM SERPL-SCNC: 3.6 MMOL/L (ref 3.5–5.5)
PROT SERPL-MCNC: 6.4 G/DL (ref 6.4–8.2)
RBC # BLD AUTO: 3.17 M/UL (ref 4.2–5.3)
SODIUM SERPL-SCNC: 140 MMOL/L (ref 136–145)
WBC # BLD AUTO: 5.8 K/UL (ref 4.6–13.2)

## 2022-08-28 PROCEDURE — 74011000250 HC RX REV CODE- 250: Performed by: INTERNAL MEDICINE

## 2022-08-28 PROCEDURE — 74011250636 HC RX REV CODE- 250/636: Performed by: INTERNAL MEDICINE

## 2022-08-28 PROCEDURE — 83735 ASSAY OF MAGNESIUM: CPT

## 2022-08-28 PROCEDURE — 36415 COLL VENOUS BLD VENIPUNCTURE: CPT

## 2022-08-28 PROCEDURE — 74011250637 HC RX REV CODE- 250/637: Performed by: HOSPITALIST

## 2022-08-28 PROCEDURE — 80053 COMPREHEN METABOLIC PANEL: CPT

## 2022-08-28 PROCEDURE — 77010033678 HC OXYGEN DAILY

## 2022-08-28 PROCEDURE — 74011250637 HC RX REV CODE- 250/637: Performed by: INTERNAL MEDICINE

## 2022-08-28 PROCEDURE — 85025 COMPLETE CBC W/AUTO DIFF WBC: CPT

## 2022-08-28 PROCEDURE — 65270000046 HC RM TELEMETRY

## 2022-08-28 PROCEDURE — C9113 INJ PANTOPRAZOLE SODIUM, VIA: HCPCS | Performed by: INTERNAL MEDICINE

## 2022-08-28 RX ADMIN — Medication 500 MG: at 08:17

## 2022-08-28 RX ADMIN — Medication 1 TABLET: at 08:17

## 2022-08-28 RX ADMIN — SODIUM CHLORIDE, PRESERVATIVE FREE 10 ML: 5 INJECTION INTRAVENOUS at 13:42

## 2022-08-28 RX ADMIN — ENOXAPARIN SODIUM 40 MG: 100 INJECTION SUBCUTANEOUS at 08:18

## 2022-08-28 RX ADMIN — GABAPENTIN 400 MG: 400 CAPSULE ORAL at 20:38

## 2022-08-28 RX ADMIN — POLYETHYLENE GLYCOL 3350 17 G: 17 POWDER, FOR SOLUTION ORAL at 08:18

## 2022-08-28 RX ADMIN — DIGOXIN 0.12 MG: 125 TABLET ORAL at 08:17

## 2022-08-28 RX ADMIN — SODIUM CHLORIDE 40 MG: 9 INJECTION, SOLUTION INTRAMUSCULAR; INTRAVENOUS; SUBCUTANEOUS at 01:55

## 2022-08-28 NOTE — PROGRESS NOTES
Problem: Falls - Risk of  Goal: *Absence of Falls  Description: Document Jaya Sol Fall Risk and appropriate interventions in the flowsheet.   Outcome: Progressing Towards Goal  Note: Fall Risk Interventions:  Mobility Interventions: Assess mobility with egress test, Bed/chair exit alarm, OT consult for ADLs, Patient to call before getting OOB, PT Consult for mobility concerns, PT Consult for assist device competence         Medication Interventions: Assess postural VS orthostatic hypotension, Bed/chair exit alarm, Patient to call before getting OOB, Teach patient to arise slowly    Elimination Interventions: Call light in reach, Elevated toilet seat, Patient to call for help with toileting needs    History of Falls Interventions: Consult care management for discharge planning, Utilize gait belt for transfer/ambulation, Vital signs minimum Q4HRs X 24 hrs (comment for end date), Room close to nurse's station         Problem: Patient Education: Go to Patient Education Activity  Goal: Patient/Family Education  Outcome: Progressing Towards Goal     Problem: General Medical Care Plan  Goal: *Vital signs within specified parameters  Outcome: Progressing Towards Goal  Goal: *Labs within defined limits  Outcome: Progressing Towards Goal  Goal: *Absence of infection signs and symptoms  Outcome: Progressing Towards Goal  Goal: *Optimal pain control at patient's stated goal  Outcome: Progressing Towards Goal  Goal: *Skin integrity maintained  Outcome: Progressing Towards Goal  Goal: *Fluid volume balance  Outcome: Progressing Towards Goal  Goal: *Optimize nutritional status  Outcome: Progressing Towards Goal  Goal: *Anxiety reduced or absent  Outcome: Progressing Towards Goal  Goal: *Progressive mobility and function (eg: ADL's)  Outcome: Progressing Towards Goal     Problem: Patient Education: Go to Patient Education Activity  Goal: Patient/Family Education  Outcome: Progressing Towards Goal     Problem: Pain  Goal: *Control of Pain  Outcome: Progressing Towards Goal  Goal: *PALLIATIVE CARE:  Alleviation of Pain  Outcome: Progressing Towards Goal     Problem: Patient Education: Go to Patient Education Activity  Goal: Patient/Family Education  Outcome: Progressing Towards Goal     Problem: Pressure Injury - Risk of  Goal: *Prevention of pressure injury  Description: Document Ko Scale and appropriate interventions in the flowsheet.   Outcome: Progressing Towards Goal  Note: Pressure Injury Interventions:       Moisture Interventions: Absorbent underpads, Limit adult briefs, Apply protective barrier, creams and emollients    Activity Interventions: Pressure redistribution bed/mattress(bed type), PT/OT evaluation    Mobility Interventions: HOB 30 degrees or less, PT/OT evaluation    Nutrition Interventions: Offer support with meals,snacks and hydration    Friction and Shear Interventions: HOB 30 degrees or less, Minimize layers, Apply protective barrier, creams and emollients                Problem: Patient Education: Go to Patient Education Activity  Goal: Patient/Family Education  Outcome: Progressing Towards Goal

## 2022-08-28 NOTE — PROGRESS NOTES
conducted an initial consultation and Spiritual Assessment for Ancelmo, who is a 65 y.o.,female. Pt and RN requested communion.  gave pt communion and provided prayers and a rosary with a prayer card. The reason the Patient came to the hospital is:   Patient Active Problem List    Diagnosis Date Noted    Atrial fibrillation ([facility] 08/26/2022    Dizziness 08/26/2022    Ataxia 08/26/2022    Constipated 08/26/2022    Atelectasis 04/06/2018    New onset a-fib ([facility] 04/06/2018    SIRS (systemic inflammatory response syndrome) ([facility] 04/05/2018    History of back surgery 04/05/2018    Spondylolisthesis of lumbar region 03/29/2018    Scoliosis of thoracolumbar spine 03/29/2018    DDD (degenerative disc disease), lumbar 03/26/2018    Acute blood loss anemia 05/21/2014    OA (osteoarthritis) of hip 05/20/2014    Gita's cerebellar ataxia ([facility] 05/20/2014    DDD (degenerative disc disease), cervical 10/04/2012        The  provided the following Interventions:  Initiated a relationship of care and support. Explored issues of barry, spirituality and/or Sabianist needs while hospitalized. Listened empathically. Provided chaplaincy education. Provided information about Spiritual Care Services. Offered prayer and assurance of continued prayers on patient's behalf. Chart reviewed. The following outcomes were achieved:  Patient shared some information about their medical narrative and spiritual journey/beliefs. Patient processed feeling about current hospitalization. Patient expressed gratitude for the 's visit. Assessment:  Patient did not indicate any spiritual or Sabianist issues which require Spiritual Care Services interventions at this time. Patient does not have any Sabianist/cultural needs that will affect patients preferences in health care. Plan:  Chaplains will continue to follow and will provide pastoral care on an as needed or requested basis.    recommends bedside caregivers page  on duty if patient shows signs of acute spiritual or emotional distress. Rev. Malachi Trinh.  Somervell Pontiff, [address] [place], [place] [address]celeste Auburn [phone]  Pager :[phone]

## 2022-08-28 NOTE — PROGRESS NOTES
[id number] Shift Summary: Pt rested well overnight with no complaints. No new clinical concerns noted.      Nightshift Chart Audit Completed

## 2022-08-28 NOTE — PROGRESS NOTES
Problem: Falls - Risk of  Goal: *Absence of Falls  Description: Document Sánchez De Guzman Fall Risk and appropriate interventions in the flowsheet.   Outcome: Progressing Towards Goal  Note: Fall Risk Interventions:  Mobility Interventions: Bed/chair exit alarm, Communicate number of staff needed for ambulation/transfer, Patient to call before getting OOB         Medication Interventions: Bed/chair exit alarm, Evaluate medications/consider consulting pharmacy, Patient to call before getting OOB, Teach patient to arise slowly    Elimination Interventions: Call light in reach, Elevated toilet seat, Patient to call for help with toileting needs, Stay With Me (per policy), Toilet paper/wipes in reach    History of Falls Interventions: Consult care management for discharge planning, Door open when patient unattended, Evaluate medications/consider consulting pharmacy, Room close to nurse's station         Problem: Patient Education: Go to Patient Education Activity  Goal: Patient/Family Education  Outcome: Progressing Towards Goal     Problem: General Medical Care Plan  Goal: *Vital signs within specified parameters  Outcome: Progressing Towards Goal  Goal: *Labs within defined limits  Outcome: Progressing Towards Goal  Goal: *Absence of infection signs and symptoms  Outcome: Progressing Towards Goal  Goal: *Optimal pain control at patient's stated goal  Outcome: Progressing Towards Goal  Goal: *Skin integrity maintained  Outcome: Progressing Towards Goal  Goal: *Fluid volume balance  Outcome: Progressing Towards Goal  Goal: *Optimize nutritional status  Outcome: Progressing Towards Goal  Goal: *Anxiety reduced or absent  Outcome: Progressing Towards Goal  Goal: *Progressive mobility and function (eg: ADL's)  Outcome: Progressing Towards Goal     Problem: Patient Education: Go to Patient Education Activity  Goal: Patient/Family Education  Outcome: Progressing Towards Goal     Problem: Pain  Goal: *Control of Pain  Outcome: Progressing Towards Goal  Goal: *PALLIATIVE CARE:  Alleviation of Pain  Outcome: Progressing Towards Goal     Problem: Patient Education: Go to Patient Education Activity  Goal: Patient/Family Education  Outcome: Progressing Towards Goal     Problem: Pressure Injury - Risk of  Goal: *Prevention of pressure injury  Description: Document Ko Scale and appropriate interventions in the flowsheet.   Outcome: Progressing Towards Goal     Problem: Patient Education: Go to Patient Education Activity  Goal: Patient/Family Education  Outcome: Progressing Towards Goal

## 2022-08-28 NOTE — PROGRESS NOTES
Hospitalist Progress Note    Patient: Monty Richmond MRN: [id number]  CSN: [id number]    YOB: 1945  Age: 65 y.o. Sex: female    DOA: 8/25/2022 LOS:  LOS: 2 days          Chief Complaint:  aflutter      Assessment/Plan     1. Atrial flutter with rapid ventricular response   Discontinue metoprolol tartrate, instead digoxin 0.125 mg by mouth once a day  Echo: Normal left ventricular systolic function with a visually estimated EF of 55 - 60%  candidate for long-term oral anticoagulation therapy but due to frequent falls will not start any anticoagulation therapy  Telemetry      2. Frequent UTIs with history of pessary and fecal impaction   Very constipated  She wants to defer the enema and keep on laxatives for now     3. Chronic Cerebellar ataxia -PT eval    4. Hypotension -improved    5.  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    Acute blood loss anemia D62    DDD (degenerative disc disease), lumbar M51.36    Spondylolisthesis of lumbar region M43.16    Scoliosis of thoracolumbar spine M41.9    SIRS (systemic inflammatory response syndrome) ([facility]) R65.10    History of back surgery Z98.890    Atelectasis J98.11    New onset a-fib ([facility] [address] I48.91    Atrial fibrillation (HCC) I48.91    Dizziness R42    Ataxia R27.0    Constipated K59.00       Subjective:    Still having a \"panicking feeling\", but no cp, sob    Review of systems:    Constitutional: denies fevers  Respiratory: denies SOB  Cardiovascular: denies chest pain, palpitations  Gastrointestinal: denies nausea, vomiting, diarrhea      Vital signs/Intake and Output:  Visit Vitals  /79   Pulse 82   Temp 98 °F (36.7 °C)   Resp 18   Ht 5' (1.524 m)   Wt 49.5 kg (109 lb 1.6 oz)   SpO2 91%   BMI 21.31 kg/m²     Current Shift:  No intake/output data recorded. Last three shifts:  08/26 1901 - 08/28 0700  In: 580 [P.O.:480; I.V.:100]  Out: 500 [Urine:500]    Exam:    General: Well developed, alert, NAD, OX3  CVS:Regular  rhythm, rose marie, no M/R/G, S1/S2 heard, no thrill  Lungs:Clear to auscultation bilaterally, no wheezes, rhonchi, or rales  Abdomen: Soft, Nontender, No distention, Normal Bowel sounds  Extremities: No C/C/E, pulses palpable 2+  Skin:normal texture and turgor, no rashes, no lesions  Neuro:grossly normal , follows commands  Psych:appropriate                Labs: Results:       Chemistry Recent Labs     08/28/22 0146 08/27/22  0640 08/26/22  0428   GLU 99 94 84    144 139   K 3.6 4.0 4.3    110 105   CO2 28 27 27   BUN 18 11 14   CREA 0.52* 0.48* 0.59*   CA 8.9 9.0 9.2   AGAP 3 7 7   BUCR 35* 23* 24*   AP 54 61 62   TP 6.4 6.0* 6.3*   ALB 3.7 3.2* 3.6   GLOB 2.7 2.8 2.7   AGRAT 1.4 1.1 1.3        CBC w/Diff Recent Labs     08/28/22 0146 08/27/22  0640 08/26/22  0428   WBC 5.8 4.9 5.2   RBC 3.17* 3.58* 3.52*   HGB 10.4* 11.7* 11.5*   HCT 33.0* 36.7 36.0    236 258   GRANS 67 67 71   LYMPH 19* 17* 16*   EOS 4 4 2        Cardiac Enzymes No results for input(s): CPK, CKND1, ELEONORA in the last 72 hours. No lab exists for component: CKRMB, TROIP   Coagulation No results for input(s): PTP, INR, APTT, INREXT, INREXT in the last 72 hours. Lipid Panel No results found for: CHOL, CHOLPOCT, CHOLX, CHLST, CHOLV, 479280, HDL, HDLP, LDL, LDLC, DLDLP, 411000, VLDLC, VLDL, TGLX, TRIGL, TRIGP, TGLPOCT, CHHD, CHHDX   BNP No results for input(s): BNPP in the last 72 hours.    Liver Enzymes Recent Labs     08/28/22  0146   TP 6.4   ALB 3.7   AP 54        Thyroid Studies Lab Results   Component Value Date/Time    TSH 1.31 08/26/2022 12:32 AM        Procedures/imaging: see electronic medical records for all procedures/Xrays and details which were not copied into this note but were reviewed prior to creation of Josue Mars MD

## 2022-08-28 NOTE — PROGRESS NOTES
Cardiology Progress Note        Patient: Israel Joseph        Sex: female          DOA: 8/25/2022  YOB: 1945      Age:  65 y.o.        LOS:  LOS: 2 days . 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    Acute blood loss anemia D62    DDD (degenerative disc disease), lumbar M51.36    Spondylolisthesis of lumbar region M43.16    Scoliosis of thoracolumbar spine M41.9    SIRS (systemic inflammatory response syndrome) ([address] R65.10    History of back surgery Z98.890    Atelectasis J98.11    New onset a-fib ([address] I48.91    Atrial fibrillation ([address] I48.91    Dizziness R42    Ataxia R27.0    Constipated K59.00       Paroxysmal atrial fibrillation CHADsVASc score is 3.0    Echocardiogram revealed       Left Ventricle: Normal left ventricular systolic function with a visually estimated EF of 55 - 60%. Left ventricle size is normal. Normal wall thickness. Normal wall motion. Normal diastolic function. Aortic Valve: Not well visualized. Mildly calcified cusp. Mild to moderate regurgitation. No stenosis. Pulmonary Arteries: Pulmonary artery was not well visualized. The estimated PASP is 25 mmHg. Telemetry monitor revealed episode of paroxysmal atrial fibrillation but patient was not symptomatic    Plan:     Continue digoxin   Patient is candidate for long-term oral anticoagulation but due to frequent falls will not start any anticoagulation therapy. Patient was told in the past that she should not take aspirin and she does not know the reason and she does not want to start it   Plan discussed with patient and family member and they verbally understood and agreed.               Subjective:    cc:    Denies any palpitation or shortness of breath      REVIEW OF SYSTEMS:     General: No fevers or chills. Cardiovascular: No chest pain,No palpitations, No orthopnea, No PND, No leg swelling, No claudication  Pulmonary: No  dyspnea. Gastrointestinal: No nausea, vomiting, bleeding  Neurology: No Dizziness    Objective:      Visit Vitals  BP (!) 103/54   Pulse 64   Temp 98.1 °F (36.7 °C)   Resp 18   Ht 5' (1.524 m)   Wt 49.5 kg (109 lb 1.6 oz)   SpO2 96%   BMI 21.31 kg/m²     Body mass index is 21.31 kg/m². Physical Exam:  General Appearance: Comfortable, not using accessory muscles of respiration. HEENT: FREDDY. HEAD: Atraumatic  NECK: No JVD, no thyroidomeglay. CAROTIDS: no bruit  LUNGS: Clear bilaterally. HEART: S1+S2 audible, no murmur, no pericardial rub. ABD: Non-tender, BS Audible    EXT: No edema, and no cyanosis. VASCULAR EXAM: Pulses are intact. PSYCHIATRIC EXAM: Mood is appropriate. MUSCULOSKELETAL: Grossly no joint deformity.   NEUROLOGICAL: AAO times 3, No motor and sensory deficit    Medication:  Current Facility-Administered Medications   Medication Dose Route Frequency    sodium chloride (NS) flush 5-40 mL  5-40 mL IntraVENous Q8H    sodium chloride (NS) flush 5-40 mL  5-40 mL IntraVENous PRN    acetaminophen (TYLENOL) tablet 650 mg  650 mg Oral Q6H PRN    Or    acetaminophen (TYLENOL) suppository 650 mg  650 mg Rectal Q6H PRN    polyethylene glycol (MIRALAX) packet 17 g  17 g Oral DAILY PRN    ondansetron (ZOFRAN ODT) tablet 4 mg  4 mg Oral Q8H PRN    Or    ondansetron (ZOFRAN) injection 4 mg  4 mg IntraVENous Q6H PRN    enoxaparin (LOVENOX) injection 40 mg  40 mg SubCUTAneous DAILY    pantoprazole (PROTONIX) 40 mg in 0.9% sodium chloride 10 mL injection  40 mg IntraVENous Q24H    gabapentin (NEURONTIN) capsule 400 mg  400 mg Oral QHS    ascorbic acid (vitamin C) (VITAMIN C) tablet 500 mg  500 mg Oral DAILY    polyethylene glycol (MIRALAX) packet 17 g  17 g Oral BID    senna-docusate (PERICOLACE) 8.6-50 mg per tablet 1 Tablet  1 Tablet Oral DAILY    bisacodyL (DULCOLAX) suppository 10 mg  10 mg Rectal DAILY PRN    digoxin (LANOXIN) tablet 0.125 mg  0.125 mg Oral DAILY               Lab/Data Reviewed:       Recent Labs     08/28/22  0146 08/27/22  0640 08/26/22  0428   WBC 5.8 4.9 5.2   HGB 10.4* 11.7* 11.5*   HCT 33.0* 36.7 36.0    236 258       Recent Labs     08/28/22  0146 08/27/22  0640 08/26/22  0428    144 139   K 3.6 4.0 4.3    110 105   CO2 28 27 27   GLU 99 94 84   BUN 18 11 14   CREA 0.52* 0.48* 0.59*   CA 8.9 9.0 9.2         Signed By: Neno Davies MD     August 28, 2022

## 2022-08-28 NOTE — PROGRESS NOTES
[id number] Bedside shift change report given to Jono Fong RN (oncoming nurse) by Darrel Casper RN (offgoing nurse). Report included the following information SBAR, Kardex, Intake/Output, MAR, and Recent Results. 1919 Bedside shift change report given to Ulises Lui (oncoming nurse) by Jono Fong RN (offgoing nurse). Report included the following information SBAR, Kardex, Intake/Output, MAR, and Recent Results.

## 2022-08-28 NOTE — ROUTINE PROCESS
Bedside and Verbal shift change report given to Dharmesh Weston RN  (oncoming nurse) by Pablo Roland RN (offgoing nurse). Report given with SBAR, Kardex, Intake/Output and Recent Results.

## 2022-08-29 LAB
ALBUMIN SERPL-MCNC: 3.5 G/DL (ref 3.4–5)
ALBUMIN/GLOB SERPL: 1.2 {RATIO} (ref 0.8–1.7)
ALP SERPL-CCNC: 57 U/L (ref 45–117)
ALT SERPL-CCNC: 23 U/L (ref 13–56)
ANION GAP SERPL CALC-SCNC: 3 MMOL/L (ref 3–18)
AST SERPL-CCNC: 16 U/L (ref 10–38)
BASOPHILS # BLD: 0 K/UL (ref 0–0.1)
BASOPHILS NFR BLD: 1 % (ref 0–2)
BILIRUB SERPL-MCNC: 0.3 MG/DL (ref 0.2–1)
BUN SERPL-MCNC: 23 MG/DL (ref 7–18)
BUN/CREAT SERPL: 44 (ref 12–20)
CALCIUM SERPL-MCNC: 9.5 MG/DL (ref 8.5–10.1)
CHLORIDE SERPL-SCNC: 108 MMOL/L (ref 100–111)
CO2 SERPL-SCNC: 29 MMOL/L (ref 21–32)
CREAT SERPL-MCNC: 0.52 MG/DL (ref 0.6–1.3)
DIFFERENTIAL METHOD BLD: ABNORMAL
EOSINOPHIL # BLD: 0.2 K/UL (ref 0–0.4)
EOSINOPHIL NFR BLD: 4 % (ref 0–5)
ERYTHROCYTE [DISTWIDTH] IN BLOOD BY AUTOMATED COUNT: 12.6 % (ref 11.6–14.5)
GLOBULIN SER CALC-MCNC: 3 G/DL (ref 2–4)
GLUCOSE SERPL-MCNC: 95 MG/DL (ref 74–99)
HCT VFR BLD AUTO: 34 % (ref 35–45)
HGB BLD-MCNC: 10.8 G/DL (ref 12–16)
IMM GRANULOCYTES # BLD AUTO: 0 K/UL (ref 0–0.04)
IMM GRANULOCYTES NFR BLD AUTO: 0 % (ref 0–0.5)
LYMPHOCYTES # BLD: 1 K/UL (ref 0.9–3.6)
LYMPHOCYTES NFR BLD: 19 % (ref 21–52)
MAGNESIUM SERPL-MCNC: 2.2 MG/DL (ref 1.6–2.6)
MCH RBC QN AUTO: 33.2 PG (ref 24–34)
MCHC RBC AUTO-ENTMCNC: 31.8 G/DL (ref 31–37)
MCV RBC AUTO: 104.6 FL (ref 78–100)
MONOCYTES # BLD: 0.6 K/UL (ref 0.05–1.2)
MONOCYTES NFR BLD: 11 % (ref 3–10)
NEUTS SEG # BLD: 3.7 K/UL (ref 1.8–8)
NEUTS SEG NFR BLD: 66 % (ref 40–73)
NRBC # BLD: 0 K/UL (ref 0–0.01)
NRBC BLD-RTO: 0 PER 100 WBC
PLATELET # BLD AUTO: 208 K/UL (ref 135–420)
PMV BLD AUTO: 10.7 FL (ref 9.2–11.8)
POTASSIUM SERPL-SCNC: 4 MMOL/L (ref 3.5–5.5)
PROT SERPL-MCNC: 6.5 G/DL (ref 6.4–8.2)
RBC # BLD AUTO: 3.25 M/UL (ref 4.2–5.3)
SODIUM SERPL-SCNC: 140 MMOL/L (ref 136–145)
WBC # BLD AUTO: 5.6 K/UL (ref 4.6–13.2)

## 2022-08-29 PROCEDURE — 97535 SELF CARE MNGMENT TRAINING: CPT

## 2022-08-29 PROCEDURE — 65270000046 HC RM TELEMETRY

## 2022-08-29 PROCEDURE — 97162 PT EVAL MOD COMPLEX 30 MIN: CPT

## 2022-08-29 PROCEDURE — 83735 ASSAY OF MAGNESIUM: CPT

## 2022-08-29 PROCEDURE — 74011250636 HC RX REV CODE- 250/636: Performed by: INTERNAL MEDICINE

## 2022-08-29 PROCEDURE — 74011000250 HC RX REV CODE- 250: Performed by: INTERNAL MEDICINE

## 2022-08-29 PROCEDURE — 97116 GAIT TRAINING THERAPY: CPT

## 2022-08-29 PROCEDURE — 97530 THERAPEUTIC ACTIVITIES: CPT

## 2022-08-29 PROCEDURE — 85025 COMPLETE CBC W/AUTO DIFF WBC: CPT

## 2022-08-29 PROCEDURE — 97166 OT EVAL MOD COMPLEX 45 MIN: CPT

## 2022-08-29 PROCEDURE — 74011250637 HC RX REV CODE- 250/637: Performed by: INTERNAL MEDICINE

## 2022-08-29 PROCEDURE — 80053 COMPREHEN METABOLIC PANEL: CPT

## 2022-08-29 PROCEDURE — 36415 COLL VENOUS BLD VENIPUNCTURE: CPT

## 2022-08-29 PROCEDURE — C9113 INJ PANTOPRAZOLE SODIUM, VIA: HCPCS | Performed by: INTERNAL MEDICINE

## 2022-08-29 PROCEDURE — 74011250637 HC RX REV CODE- 250/637: Performed by: HOSPITALIST

## 2022-08-29 RX ORDER — DIGOXIN 0.25 MG/ML
250 INJECTION INTRAMUSCULAR; INTRAVENOUS
Status: COMPLETED | OUTPATIENT
Start: 2022-08-29 | End: 2022-08-29

## 2022-08-29 RX ORDER — DIGOXIN 0.25 MG/ML
125 INJECTION INTRAMUSCULAR; INTRAVENOUS DAILY
Status: DISCONTINUED | OUTPATIENT
Start: 2022-08-30 | End: 2022-09-01

## 2022-08-29 RX ORDER — DIGOXIN 0.25 MG/ML
125 INJECTION INTRAMUSCULAR; INTRAVENOUS
Status: COMPLETED | OUTPATIENT
Start: 2022-08-29 | End: 2022-08-29

## 2022-08-29 RX ORDER — DIGOXIN 0.25 MG/ML
125 INJECTION INTRAMUSCULAR; INTRAVENOUS
Status: DISPENSED | OUTPATIENT
Start: 2022-08-29 | End: 2022-08-30

## 2022-08-29 RX ORDER — ENOXAPARIN SODIUM 100 MG/ML
1 INJECTION SUBCUTANEOUS EVERY 12 HOURS
Status: DISCONTINUED | OUTPATIENT
Start: 2022-08-29 | End: 2022-09-02 | Stop reason: HOSPADM

## 2022-08-29 RX ORDER — PANTOPRAZOLE SODIUM 40 MG/1
40 TABLET, DELAYED RELEASE ORAL
Status: DISCONTINUED | OUTPATIENT
Start: 2022-08-30 | End: 2022-09-02 | Stop reason: HOSPADM

## 2022-08-29 RX ADMIN — DIGOXIN 0.12 MG: 125 TABLET ORAL at 08:53

## 2022-08-29 RX ADMIN — Medication 1 TABLET: at 08:53

## 2022-08-29 RX ADMIN — ENOXAPARIN SODIUM 50 MG: 100 INJECTION SUBCUTANEOUS at 21:48

## 2022-08-29 RX ADMIN — GABAPENTIN 400 MG: 400 CAPSULE ORAL at 21:47

## 2022-08-29 RX ADMIN — Medication 500 MG: at 09:00

## 2022-08-29 RX ADMIN — DIGOXIN 125 MCG: 250 INJECTION, SOLUTION INTRAMUSCULAR; INTRAVENOUS at 12:35

## 2022-08-29 RX ADMIN — SODIUM CHLORIDE, PRESERVATIVE FREE 10 ML: 5 INJECTION INTRAVENOUS at 21:48

## 2022-08-29 RX ADMIN — POLYETHYLENE GLYCOL 3350 17 G: 17 POWDER, FOR SOLUTION ORAL at 21:47

## 2022-08-29 RX ADMIN — SODIUM CHLORIDE 40 MG: 9 INJECTION, SOLUTION INTRAMUSCULAR; INTRAVENOUS; SUBCUTANEOUS at 01:16

## 2022-08-29 RX ADMIN — DIGOXIN 250 MCG: 0.25 INJECTION INTRAMUSCULAR; INTRAVENOUS at 21:47

## 2022-08-29 RX ADMIN — ENOXAPARIN SODIUM 40 MG: 100 INJECTION SUBCUTANEOUS at 08:53

## 2022-08-29 RX ADMIN — POLYETHYLENE GLYCOL 3350 17 G: 17 POWDER, FOR SOLUTION ORAL at 08:53

## 2022-08-29 NOTE — PROGRESS NOTES
D/C Plan: Anticipate rehab versus home with [facility] pending therapy recommendations ans clinical progression    CM notes therapy ordered. CM to watch for recommendations to assist with identifying any discharge planning needs. Noted therapy recommendation. CM Met with pt/family and explained CM role and to discuss the plan of care. Pt and family are in agreement with therapy recommendations. Offered freedom of choice for SNF  Provided a list of area agencies/facilities to refer to to assist family with making a determination. Care Management Interventions  PCP Verified by CM:  Yes  Last Visit to PCP: 08/01/22  Transition of Care Consult (CM Consult): Discharge Planning  Support Systems: Spouse/Significant Other  Confirm Follow Up Transport: Family  The Plan for Transition of Care is Related to the Following Treatment Goals : home with [facility] versus vs. home with family  Discharge Location  Patient Expects to be Discharged to[de-identified] Home with family assistance

## 2022-08-29 NOTE — PROGRESS NOTES
OCCUPATIONAL THERAPY EVALUATION    Problem: Self Care Deficits Care Plan (Adult)  Goal: *Acute Goals and Plan of Care (Insert Text)  Outcome: Progressing Towards Goal  Note:   FUNCTIONAL STATUS PRIOR TO ADMISSION: Pt at home with spouse, was mod I prior to hospital stay, used walker for mobility but has had several falls     HOME SUPPORT: The patient lived with . Initial Occupational Therapy Goals (8/29/2022) Within 7 day(s):  1. Patient will perform perform grooming standing wink level with mod I with no LOB for 5 minutes for increased independence in ADLs. 2. Patient will perform UB dressing with I seated EOB for increased independence with ADLs. 3. Patient will perform LB dressing with mod i & A/E PRN for increased independence with ADLs. 4. Patient will perform all aspects of toileting with mod I for increased independence with ADLs. 5. Patient will perform LE ADLs utilizing body mechanics & adaptive strategies with 1 verbal cue for increased safety in ADLs. 6. Patient will independently apply energy conservation techniques with no verbal cue(s) for increased independence with ADLs. Patient: Elliott Ribeiro (15 y.o. female)  Date: 8/29/2022  Primary Diagnosis: Atrial fibrillation ([facility] [address] [I48.91]  Dizziness [R42]  Ataxia [R27.0]       Precautions:   Fall  PLOF: Pt and  reports she was grossly mod I with walker, grab bars but has had multiple falls recently with walker    ASSESSMENT :  Based on the objective data described below, the patient presents with decreased functional stability ,decreased awareness into defecits. Pt presents supine in bed,  present. Pt able to perform supine to sit with SBA. Left leg bowlegged which pt reports has been present for a long time but cannot specify when. Pt sit to stand with min A, very shaky through BUE and B LE, multiple cues to lean forward and keep walker close though pt does not.  Needs min A to assist with maintaining midline when advancing steps. Pt walks with hitch though no LOB, continues to be very shaking. Once in bathroom with turning, pt has multiple posterior LOB and needs heavy dues to maintain forward position. Pt unable to discern grab bar from walker and needs hand over hand placement. Pt able to perform urine hygiene but with standing does not use grab bars, attempts to pull up from walker. Needs min a to pull pants over hips and min A to walk back. Pt educated on safety with  and that during times of shakyness that we need to maybe use the the w/c more around the house in order to help with safety. Pt would benefit from skilled OT in acute setting, pt would benefit from home health or SNF depending on progress. Patient will benefit from skilled intervention to address the above impairments. Patient's rehabilitation potential is considered to be Fair  Factors which may influence rehabilitation potential include:   []             None noted  []             Mental ability/status  [x]             Medical condition  [x]             Home/family situation and support systems  [x]             Safety awareness  []             Pain tolerance/management  []             Other:      PLAN :  Recommendations and Planned Interventions:   [x]               Self Care Training                  [x]      Therapeutic Activities  []               Functional Mobility Training   []      Cognitive Retraining  []               Therapeutic Exercises           [x]      Endurance Activities  [x]               Balance Training                    [x]      Neuromuscular Re-Education  [x]               Visual/Perceptual Training     [x]      Home Safety Training  [x]               Patient Education                   [x]      Family Training/Education  []               Other (comment):    Frequency/Duration: Patient will be followed by occupational therapy daily to address goals.   Discharge Recommendations: SNF vs [facility]   Further Equipment Recommendations for Discharge: David stroud    [facility]: 17    This AMPAC score should be considered in conjunction with interdisciplinary team recommendations to determine the most appropriate discharge setting. Patient's social support, diagnosis, medical stability, and prior level of function should also be taken into consideration. SUBJECTIVE:   Patient stated im fine.     OBJECTIVE DATA SUMMARY:     Past Medical History:   Diagnosis Date    Arthritis     Chronic pain     lower back    GERD (gastroesophageal reflux disease)     Ill-defined condition     Gita cerebella ataxia    Menopause     Age 47    Other ill-defined conditions(799.89)     Gita's Cerebellar Ataxia    Other ill-defined conditions(799.89)     Pessary    Psychiatric disorder     depression     Past Surgical History:   Procedure Laterality Date    HX CERVICAL FUSION  2012    anterior    HX GI      prolapse rectum    HX HEENT      OS muscle surgery    HX ORTHOPAEDIC      Right knee ORIF    HX ORTHOPAEDIC  2014    right total hip    HX OTHER SURGICAL      repair rectal prolapse     Barriers to Learning/Limitations: None  Compensate with: visual, verbal, tactile, kinesthetic cues/model    Home Situation:   Home Situation  Home Environment: Private residence  One/Two Story Residence: One story  Living Alone: No  Support Systems: Spouse/Significant Other  Patient Expects to be Discharged to[de-identified] Home with family assistance  Current DME Used/Available at Home: None  Tub or Shower Type: Tub/Shower combination  []  Right hand dominant   []  Left hand dominant    Cognitive/Behavioral Status:  Neurologic State: Alert; Appropriate for age  Orientation Level: Oriented X4  Cognition: Follows commands  Safety/Judgement: Decreased awareness of need for assistance;Decreased insight into deficits    Skin: Intact  Edema: none noted    Vision/Perceptual:    Tracking: Able to track stimulus in all quadrants w/o difficulty          Coordination: BUE  Coordination: Generally decreased, functional  Fine Motor Skills-Upper: Left Intact; Right Intact    Gross Motor Skills-Upper: Left Impaired;Right Impaired    Balance:  Sitting: Intact  Standing: Impaired; With support  Standing - Static: Fair  Standing - Dynamic : Occasional    Strength: BUE    Strength: Generally decreased, functional                Tone & Sensation: BUE    Tone: Normal  Sensation: Intact                      Range of Motion: BUE    AROM: Generally decreased, functional      Functional Mobility and Transfers for ADLs:  Bed Mobility:     Supine to Sit: Stand-by assistance  Sit to Supine: Stand-by assistance  Scooting: Stand-by assistance  Transfers:  Sit to Stand: Contact guard assistance  Stand to Sit: Stand-by assistance     Toilet Transfer : Minimum assistance           Bathroom Mobility: Minimum assistance    ADL Assessment:   Feeding: Supervision    Oral Facial Hygiene/Grooming: Supervision    Bathing: Contact guard assistance    Upper Body Dressing: Stand-by assistance    Lower Body Dressing: Stand-by assistance    Toileting: Minimum assistance    ADL Intervention:      Lower Body Dressing Assistance  Dressing Assistance: Contact guard assistance  Socks: Contact guard assistance    Toileting  Toileting Assistance: Minimum assistance  Bladder Hygiene: Minimum assistance    Cognitive Retraining  Problem Solving: General alternative solution  Executive Functions: Managing time;Regulating behavior  Organizing/Sequencing: Breaking task down  Safety/Judgement: Decreased awareness of need for assistance;Decreased insight into deficits  Pain:  Pain level pre-treatment: 0/10   Pain level post-treatment: 0/10   Pain Intervention(s): Medication (see MAR); Rest, Ice, Repositioning0   Response to intervention: Nurse notified, See doc flow    Activity Tolerance:   Fair but very imbalanced  Please refer to the flowsheet for vital signs taken during this treatment.   After treatment:   [] Patient left in no apparent distress sitting up in chair  [x] Patient left in no apparent distress in bed  [] Call bell left within reach  [] Nursing notified  [] Caregiver present  [] Bed alarm activated    COMMUNICATION/EDUCATION:   [] Role of Occupational Therapy in the acute care setting  [] Home safety education was provided and the patient/caregiver indicated understanding. [] Patient/family have participated as able in goal setting and plan of care. [] Patient/family agree to work toward stated goals and plan of care. [] Patient understands intent and goals of therapy, but is neutral about his/her participation. [] Patient is unable to participate in goal setting and plan of care. Thank you for this referral.  Em Castanon OTD, OTR/L   Time Calculation: 25 mins    Eval Complexity: History: MEDIUM Complexity : Expanded review of history including physical, cognitive and psychosocial  history ; Examination: MEDIUM Complexity : 3-5 performance deficits relating to physical, cognitive , or psychosocial skils that result in activity limitations and / or participation restrictions; Decision Making:MEDIUM Complexity : Patient may present with comorbidities that affect occupational performnce. Miniml to moderate modification of tasks or assistance (eg, physical or verbal ) with assesment(s) is necessary to enable patient to complete evaluation     [facility] Daily Activity Inpatient Short Form (6-Clicks)*    How much HELP from another person does the patient currently need    (If the patient hasn't done an activity recently, how much help from another person do you think he/she would need if he/she tried?)   Total (Total A or Dep)   A Lot  (Mod to Max A)   A Little (Sup or Min A)   None (Mod I to I)   Putting on and taking off regular lower body clothing? [] 1 [] 2 [x] 3 [] 4   2. Bathing (including washing, rinsing,      drying)? [] 1 [] 2 [x] 3 [] 4   3.  Toileting, which includes using toilet, bedpan or urinal?   [] 1 [x] 2 [] 3 [] 4   4. Putting on and taking off regular upper body clothing? [] 1 [] 2 [x] 3 [] 4   5. Taking care of personal grooming such as brushing teeth? [] 1 [] 2 [x] 3 [] 4   6. Eating meals? [] 1 [] 2 [x] 3 [] 4     Based on an AM-PAC score of **/24 and their current ADL deficits; it is recommended that the patient have 5-7 sessions per week of Occupational Therapy at d/c to increase the patient's independence. Currently, this patient demonstrates the potential endurance, and/or tolerance for 3 hours of therapy each day at d/c. Based on an AM-PAC score of **/24 and their current ADL deficits; it is recommended that the patient have 3-5 sessions per week of Occupational Therapy at d/c to increase the patient's independence. Based on an AM-PAC score of **/24 and their current ADL deficits; it is recommended that the patient have 2-3 sessions per week of Occupational Therapy at d/c to increase the patient's independence. At this time and based on an AM-PAC score of **/24, no further OT is recommended upon discharge due to (i.e. patient at baseline functional statusetc). Recommend patient returns to prior setting with prior services.

## 2022-08-29 NOTE — PROGRESS NOTES
1- Dr. Yuliya Lima paged, pt HR afib 140-160bpm    1210- Dr. Yuliya Lima referred ro Dean Calderon.     1218- Dr Dean Calderon telephone order 125mcg digoxin IV one time

## 2022-08-29 NOTE — PROGRESS NOTES
Hospitalist Progress Note    Patient: Gerson Gleason MRN: [id number]  CSN: [id number]    YOB: 1945  Age: 65 y.o. Sex: female    DOA: 8/25/2022 LOS:  LOS: 3 days          Chief Complaint:    aflutter      Assessment/Plan     1. Atrial flutter with rapid ventricular response   On digoxin 0.125 mg by mouth once a day  Echo: Normal left ventricular systolic function with a visually estimated EF of 55 - 60%  candidate for long-term oral anticoagulation therapy but due to frequent falls will not start any anticoagulation therapy  Telemetry   Her heart rate is still variable and she complains of palpitations still     2. Frequent UTIs with history of pessary and fecal impaction   Constipation better     3. Chronic Cerebellar ataxia -PT eval     4. Hypotension -improved     5.  Constipation -improving      Telemetry monitoring  Cardiology following      PT/OT      Disposition :  Patient Active Problem List   Diagnosis Code    DDD (degenerative disc disease), cervical M50.30    OA (osteoarthritis) of hip M16.9    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    Acute blood loss anemia D62    DDD (degenerative disc disease), lumbar M51.36    Spondylolisthesis of lumbar region M43.16    Scoliosis of thoracolumbar spine M41.9    SIRS (systemic inflammatory response syndrome) (HCC) R65.10    History of back surgery Z98.890    Atelectasis J98.11    New onset a-fib ([address] I48.91    Atrial fibrillation (HCC) I48.91    Dizziness R42    Ataxia R27.0    Constipated K59.00       Subjective:  I still feel palp  Not feeling right yet  When I get up my heart rate jumps up      Review of systems:    Constitutional: denies fevers, chills  Respiratory: denies SOB, cough  Cardiovascular: denies chest pain  Gastrointestinal: denies nausea, vomiting, diarrhea      Vital signs/Intake and Output:  Visit Vitals  /62   Pulse (!) 59   Temp 97.6 °F (36.4 °C)   Resp 16   Ht 5' (1.524 m)   Wt 49.5 kg (109 lb 1.6 oz)   SpO2 100%   BMI 21.31 kg/m²     Current Shift:  No intake/output data recorded. Last three shifts:  08/27 1901 - 08/29 0700  In: 240 [P.O.:240]  Out: -     Exam:    General: elderly WF, alert, NAD, OX3  CVS:irreg rhythm, no M/R/G, S1/S2 heard, no thrill  Lungs:Clear to auscultation bilaterally, no wheezes, rhonchi, or rales  Abdomen: Soft, Nontender, No distention, Normal Bowel sounds  Extremities: No C/C/E, pulses palpable 2+  Neuro:grossly normal , follows commands  Psych:appropriate                Labs: Results:       Chemistry Recent Labs     08/29/22 0145 08/28/22 0146 08/27/22  0640   GLU 95 99 94    140 144   K 4.0 3.6 4.0    109 110   CO2 29 28 27   BUN 23* 18 11   CREA 0.52* 0.52* 0.48*   CA 9.5 8.9 9.0   AGAP 3 3 7   BUCR 44* 35* 23*   AP 57 54 61   TP 6.5 6.4 6.0*   ALB 3.5 3.7 3.2*   GLOB 3.0 2.7 2.8   AGRAT 1.2 1.4 1.1      CBC w/Diff Recent Labs     08/29/22 0145 08/28/22 0146 08/27/22  0640   WBC 5.6 5.8 4.9   RBC 3.25* 3.17* 3.58*   HGB 10.8* 10.4* 11.7*   HCT 34.0* 33.0* 36.7    196 236   GRANS 66 67 67   LYMPH 19* 19* 17*   EOS 4 4 4      Cardiac Enzymes No results for input(s): CPK, CKND1, ELEONORA in the last 72 hours. No lab exists for component: CKRMB, TROIP   Coagulation No results for input(s): PTP, INR, APTT, INREXT in the last 72 hours. Lipid Panel No results found for: CHOL, CHOLPOCT, CHOLX, CHLST, CHOLV, 814414, HDL, HDLP, LDL, LDLC, DLDLP, 904924, VLDLC, VLDL, TGLX, TRIGL, TRIGP, TGLPOCT, CHHD, CHHDX   BNP No results for input(s): BNPP in the last 72 hours.    Liver Enzymes Recent Labs     08/29/22  0145   TP 6.5   ALB 3.5   AP 57      Thyroid Studies Lab Results   Component Value Date/Time    TSH 1.31 08/26/2022 12:32 AM        Procedures/imaging: see electronic medical records for all procedures/Xrays and details which were not copied into this note but were reviewed prior to creation of Austin Toure MD

## 2022-08-29 NOTE — PROGRESS NOTES
[id number] Shift Summary: Pt rested well with no complaints. One large BM. No new clinical concerns noted.      Nightshift Chart Audit Completed

## 2022-08-29 NOTE — PROGRESS NOTES
Bedside and Verbal shift change report given to Easton Slaughter (oncoming nurse) by Taqueria King (offgoing nurse).  Report included the following information SBAR, Kardex, Intake/Output, MAR, Recent Results, and Cardiac Rhythm SR .

## 2022-08-29 NOTE — ROUTINE PROCESS
Bedside and Verbal shift change report given to Pritesh Hanna [address]  (oncoming nurse) by Anuel Frank RN  (offgoing nurse). Report given with SBAR, Kardex, Intake/Output and Recent Results.

## 2022-08-30 LAB
ALBUMIN SERPL-MCNC: 3.6 G/DL (ref 3.4–5)
ALBUMIN/GLOB SERPL: 1.2 {RATIO} (ref 0.8–1.7)
ALP SERPL-CCNC: 65 U/L (ref 45–117)
ALT SERPL-CCNC: 23 U/L (ref 13–56)
ANION GAP SERPL CALC-SCNC: 6 MMOL/L (ref 3–18)
AST SERPL-CCNC: 15 U/L (ref 10–38)
BASOPHILS # BLD: 0.1 K/UL (ref 0–0.1)
BASOPHILS NFR BLD: 1 % (ref 0–2)
BILIRUB SERPL-MCNC: 0.5 MG/DL (ref 0.2–1)
BUN SERPL-MCNC: 19 MG/DL (ref 7–18)
BUN/CREAT SERPL: 37 (ref 12–20)
CALCIUM SERPL-MCNC: 9.6 MG/DL (ref 8.5–10.1)
CHLORIDE SERPL-SCNC: 109 MMOL/L (ref 100–111)
CO2 SERPL-SCNC: 25 MMOL/L (ref 21–32)
CREAT SERPL-MCNC: 0.51 MG/DL (ref 0.6–1.3)
DIFFERENTIAL METHOD BLD: ABNORMAL
EOSINOPHIL # BLD: 0.2 K/UL (ref 0–0.4)
EOSINOPHIL NFR BLD: 2 % (ref 0–5)
ERYTHROCYTE [DISTWIDTH] IN BLOOD BY AUTOMATED COUNT: 12.6 % (ref 11.6–14.5)
GLOBULIN SER CALC-MCNC: 3.1 G/DL (ref 2–4)
GLUCOSE SERPL-MCNC: 94 MG/DL (ref 74–99)
HCT VFR BLD AUTO: 39.8 % (ref 35–45)
HGB BLD-MCNC: 12.8 G/DL (ref 12–16)
IMM GRANULOCYTES # BLD AUTO: 0 K/UL (ref 0–0.04)
IMM GRANULOCYTES NFR BLD AUTO: 0 % (ref 0–0.5)
LYMPHOCYTES # BLD: 1.3 K/UL (ref 0.9–3.6)
LYMPHOCYTES NFR BLD: 18 % (ref 21–52)
MCH RBC QN AUTO: 32.7 PG (ref 24–34)
MCHC RBC AUTO-ENTMCNC: 32.2 G/DL (ref 31–37)
MCV RBC AUTO: 101.8 FL (ref 78–100)
MONOCYTES # BLD: 0.7 K/UL (ref 0.05–1.2)
MONOCYTES NFR BLD: 10 % (ref 3–10)
NEUTS SEG # BLD: 4.9 K/UL (ref 1.8–8)
NEUTS SEG NFR BLD: 68 % (ref 40–73)
NRBC # BLD: 0 K/UL (ref 0–0.01)
NRBC BLD-RTO: 0 PER 100 WBC
PLATELET # BLD AUTO: 234 K/UL (ref 135–420)
PMV BLD AUTO: 10.7 FL (ref 9.2–11.8)
POTASSIUM SERPL-SCNC: 3.8 MMOL/L (ref 3.5–5.5)
PROT SERPL-MCNC: 6.7 G/DL (ref 6.4–8.2)
RBC # BLD AUTO: 3.91 M/UL (ref 4.2–5.3)
SODIUM SERPL-SCNC: 140 MMOL/L (ref 136–145)
WBC # BLD AUTO: 7.1 K/UL (ref 4.6–13.2)

## 2022-08-30 PROCEDURE — 74011250637 HC RX REV CODE- 250/637: Performed by: INTERNAL MEDICINE

## 2022-08-30 PROCEDURE — 36415 COLL VENOUS BLD VENIPUNCTURE: CPT

## 2022-08-30 PROCEDURE — 97530 THERAPEUTIC ACTIVITIES: CPT

## 2022-08-30 PROCEDURE — 80053 COMPREHEN METABOLIC PANEL: CPT

## 2022-08-30 PROCEDURE — 65270000046 HC RM TELEMETRY

## 2022-08-30 PROCEDURE — 74011000250 HC RX REV CODE- 250: Performed by: INTERNAL MEDICINE

## 2022-08-30 PROCEDURE — 74011250636 HC RX REV CODE- 250/636: Performed by: INTERNAL MEDICINE

## 2022-08-30 PROCEDURE — 97535 SELF CARE MNGMENT TRAINING: CPT

## 2022-08-30 PROCEDURE — 74011250637 HC RX REV CODE- 250/637: Performed by: HOSPITALIST

## 2022-08-30 PROCEDURE — 85025 COMPLETE CBC W/AUTO DIFF WBC: CPT

## 2022-08-30 RX ADMIN — ENOXAPARIN SODIUM 50 MG: 100 INJECTION SUBCUTANEOUS at 09:08

## 2022-08-30 RX ADMIN — SODIUM CHLORIDE, PRESERVATIVE FREE 10 ML: 5 INJECTION INTRAVENOUS at 06:44

## 2022-08-30 RX ADMIN — POLYETHYLENE GLYCOL 3350 17 G: 17 POWDER, FOR SOLUTION ORAL at 21:18

## 2022-08-30 RX ADMIN — DIGOXIN 125 MCG: 250 INJECTION, SOLUTION INTRAMUSCULAR; INTRAVENOUS at 09:08

## 2022-08-30 RX ADMIN — SODIUM CHLORIDE, PRESERVATIVE FREE 10 ML: 5 INJECTION INTRAVENOUS at 21:18

## 2022-08-30 RX ADMIN — PANTOPRAZOLE SODIUM 40 MG: 40 TABLET, DELAYED RELEASE ORAL at 06:43

## 2022-08-30 RX ADMIN — SODIUM CHLORIDE, PRESERVATIVE FREE 10 ML: 5 INJECTION INTRAVENOUS at 13:17

## 2022-08-30 RX ADMIN — GABAPENTIN 400 MG: 400 CAPSULE ORAL at 21:18

## 2022-08-30 RX ADMIN — Medication 1 TABLET: at 09:08

## 2022-08-30 RX ADMIN — POLYETHYLENE GLYCOL 3350 17 G: 17 POWDER, FOR SOLUTION ORAL at 09:08

## 2022-08-30 RX ADMIN — Medication 500 MG: at 09:08

## 2022-08-30 RX ADMIN — ENOXAPARIN SODIUM 50 MG: 100 INJECTION SUBCUTANEOUS at 21:18

## 2022-08-30 NOTE — PROGRESS NOTES
Problem: Self Care Deficits Care Plan (Adult)  Goal: *Acute Goals and Plan of Care (Insert Text)  Description: Initial Occupational Therapy Goals (8/29/2022) Within 7 day(s):  1. Patient will perform perform grooming standing wink level with mod I with no LOB for 5 minutes for increased independence in ADLs. 2. Patient will perform UB dressing with I seated EOB for increased independence with ADLs. 3. Patient will perform LB dressing with mod i & A/E PRN for increased independence with ADLs. 4. Patient will perform all aspects of toileting with mod I for increased independence with ADLs. 5. Patient will perform LE ADLs utilizing body mechanics & adaptive strategies with 1 verbal cue for increased safety in ADLs. 6. Patient will independently apply energy conservation techniques with no verbal cue(s) for increased independence with ADLs. 8/30/2022 1927 by Evelyne Muhammad OT  Outcome: Progressing Towards Goal     OCCUPATIONAL THERAPY TREATMENT    Patient: Stan Dacosta (41 y.o. female)  Date: 8/30/2022  Diagnosis: Atrial fibrillation ([address] [I48.91]  Dizziness [R42]  Ataxia [R27.0] Atrial fibrillation [facility])      Precautions: Fall  PLOF:     Chart, occupational therapy assessment, plan of care, and goals were reviewed. ASSESSMENT:  Pt presented in long sitting on bed at the beginning of session and agrees to participate with therapy. Pt performed bed mobility, supine<>sit, sit<>Stand transfers with min A and constant support. Pt ambulate to the bathroom and participates with oral care and grooming standing at sink for ~10 minutes. Pt was left supine in bed at the end of session in NAD. Reports fatigue by the end of session; ECT reviewed and pt verbalized understanding for the same.      Progression toward goals:  []          Improving appropriately and progressing toward goals  [x]          Improving slowly and progressing toward goals  []          Not making progress toward goals and plan of care will be adjusted     PLAN:  Patient continues to benefit from skilled intervention to address the above impairments. Continue treatment per established plan of care. Discharge Recommendations: Rehab and Home Health  Further Equipment Recommendations for Discharge:  N/A    AMPA: 16/24    This AMPAC score should be considered in conjunction with interdisciplinary team recommendations to determine the most appropriate discharge setting. Patient's social support, diagnosis, medical stability, and prior level of function should also be taken into consideration. SUBJECTIVE:   Patient stated  I would like to brush my teeth.     OBJECTIVE DATA SUMMARY:   Cognitive/Behavioral Status:  Neurologic State: Alert  Orientation Level: Oriented X4  Cognition: Appropriate for age attention/concentration, Follows commands  Safety/Judgement: Fall prevention    Functional Mobility and Transfers for ADLs:   Bed Mobility:  Supine to Sit: Contact guard assistance  Sit to Supine: Contact guard assistance   Transfers:  Sit to Stand: Minimum assistance;Contact guard assistance  Stand to Sit: Contact guard assistance   Bathroom Mobility: Contact guard assistance;Minimum assistance  Balance:  Sitting: Intact  Standing: Impaired; With support  Standing - Static: Fair;Constant support  Standing - Dynamic : Fair;Constant support  ADL Intervention:  Grooming  Grooming Assistance: Contact guard assistance;Minimum assistance  Position Performed: Standing  Washing Face: Contact guard assistance;Stand-by assistance  Washing Hands: Contact guard assistance  Brushing Teeth: Contact guard assistance;Stand-by assistance    Cognitive Retraining  Safety/Judgement: Fall prevention  Pain:  Pain level pre-treatment: 0/10   Pain level post-treatment: 0/10  Pain Intervention(s): Medication (see MAR);  Rest, Ice, Repositioning   Response to intervention: Nurse notified, See doc flow    Activity Tolerance:    Fair     Please refer to the flowsheet for vital signs taken during this treatment. After treatment:   []  Patient left in no apparent distress sitting up in chair  [x]  Patient left in no apparent distress in bed  [x]  Call bell left within reach  [x]  Nursing notified  []  Caregiver present  []  Bed alarm activated    COMMUNICATION/EDUCATION:   [x] Role of Occupational Therapy in the acute care setting  [x] Home safety education was provided and the patient/caregiver indicated understanding. [x] Patient/family have participated as able in working towards goals and plan of care. [x] Patient/family agree to work toward stated goals and plan of care. [] Patient understands intent and goals of therapy, but is neutral about his/her participation. [] Patient is unable to participate in goal setting and plan of care. Thank you for this referral.  Clover Dhillon OTR/L  Time Calculation: 28 mins    Valeriano Gottlieb AM-PAC® Daily Activity Inpatient Short Form (6-Clicks)*    How much HELP from another person does the patient currently need    (If the patient hasn't done an activity recently, how much help from another person do you think he/she would need if he/she tried?)   Total (Total A or Dep)   A Lot  (Mod to Max A)   A Little (Sup or Min A)   None (Mod I to I)   Putting on and taking off regular lower body clothing? [] 1 [] 2 [x] 3 [] 4   2. Bathing (including washing, rinsing,      drying)? [] 1 [x] 2 [] 3 [] 4   3. Toileting, which includes using toilet, bedpan or urinal?   [] 1 [x] 2 [] 3 [] 4   4. Putting on and taking off regular upper body clothing? [] 1 [] 2 [x] 3 [] 4   5. Taking care of personal grooming such as brushing teeth? [] 1 [x] 2 [] 3 [] 4   6. Eating meals? [] 1 [] 2 [] 3 [x] 4        Based on an AM-PAC score of 16/24 and their current ADL deficits; it is recommended that the patient have 2-3 sessions per week of Occupational Therapy at d/c to increase the patient's independence.

## 2022-08-30 NOTE — PROGRESS NOTES
D/C Plan: Discharge to [facility] for rehab pending insurance auth and medically appropriate     Update: Patient accepted at [facility] pending [place]    CM followed up with rehab facilities, no accepting facilities at this time. Facilities are reviewing and will update CM with admission status. New clinicals sent for review. Care Management Interventions  PCP Verified by CM:  Yes  Last Visit to PCP: 08/01/22  Transition of Care Consult (CM Consult): Discharge Planning, SNF  Physical Therapy Consult: Yes  Occupational Therapy Consult: Yes  Support Systems: Spouse/Significant Other  Confirm Follow Up Transport: Family  The Plan for Transition of Care is Related to the Following Treatment Goals : rehab vs. home with [facility]  Discharge Location  Patient Expects to be Discharged to[de-identified] Skilled nursing facility

## 2022-08-30 NOTE — PROGRESS NOTES
Problem: Mobility Impaired (Adult and Pediatric)  Goal: *Acute Goals and Plan of Care (Insert Text)  Description: Physical Therapy Goals   Initiated 8/29/2022 and to be accomplished within 7 day(s)  1. Patient will move from supine <> sit with S in prep for out of bed activity and change of position. 2.  Patient will perform sit<> stand with S/RW in prep for transfers/ambulation. 3.  Patient will transfer from bed <> chair with S/RW for time up in chair for completion of ADL activity. 4.  Patient will ambulate 80 feet with S/RW for improved functional mobility at discharge. Outcome: Progressing Towards Goal       physical Therapy TREATMENT    Patient: Corinne Hove (41 y.o. female)  Date: 8/29/2022  Diagnosis: Atrial fibrillation ([address] [I48.91]  Dizziness [R42]  Ataxia [R27.0] Atrial fibrillation [facility])  Precautions: Fall   Chart, physical therapy assessment, plan of care and goals were reviewed. ASSESSMENT:  Telemetry monitor reports pt HR continues elevation since noon, Afib 180's. Returned to room. Pt up in chair eating meal.  Assisted back to bed with min A and left sitting up long sit position with meal tray present. Pt appears in NAD, however, in Afib and tachy. Nurse Trish Chaudhry made aware and will medicate pt. Will f/up as tolerated. Progression toward goals:  []      Improving appropriately and progressing toward goals  [x]      Improving slowly and progressing toward goals  []      Not making progress toward goals and plan of care will be adjusted     PLAN:  Patient continues to benefit from skilled intervention to address the above impairments. Continue treatment per established plan of care. Discharge Recommendations:  Rehab  Further Equipment Recommendations for Discharge:  N/A     SUBJECTIVE:   Patient stated Okay good.     OBJECTIVE DATA SUMMARY:   Critical Behavior:  Neurologic State: Alert, Appropriate for age  Orientation Level: Oriented X4  Cognition: Follows commands  Safety/Judgement: Decreased insight into deficits, Decreased awareness of need for assistance  Functional Mobility Training:  Bed Mobility:  Sit to Supine: Minimum assistance  Transfers:  Sit to Stand: Minimum assistance  Stand to Sit: Contact guard assistance;Minimum assistance  Bed to Chair: Minimum assistance (chair to bed)  Balance:  Sitting: Intact  Standing: Impaired; With support  Standing - Static: Fair;Constant support  Standing - Dynamic : Fair;Constant support (fair -)  Pain:  Pain Scale 1: Numeric (0 - 10)  Pain Intensity 1: 0  Activity Tolerance:   Fair   Please refer to the flowsheet for vital signs taken during this treatment.   After treatment:   [] Patient left in no apparent distress sitting up in chair  [x] Patient left in no apparent distress in bed  [x] Call bell left within reach  [x] Nursing notified  [x] Caregiver present  [] Bed alarm activated      Sonya Chowdhury, TETO   Time Calculation: 10 mins

## 2022-08-30 NOTE — PROGRESS NOTES
Problem: Mobility Impaired (Adult and Pediatric)  Goal: *Acute Goals and Plan of Care (Insert Text)  Description: Physical Therapy Goals   Initiated 8/29/2022 and to be accomplished within 7 day(s)  1. Patient will move from supine <> sit with S in prep for out of bed activity and change of position. 2.  Patient will perform sit<> stand with S/RW in prep for transfers/ambulation. 3.  Patient will transfer from bed <> chair with S/RW for time up in chair for completion of ADL activity. 4.  Patient will ambulate 80 feet with S/RW for improved functional mobility at discharge. Outcome: Progressing Towards Goal    PHYSICAL THERAPY EVALUATION    Patient: Marlon Krguer (6 y.o. female)  Date: 8/29/2022  Primary Diagnosis: Atrial fibrillation ([address] [I48.91]  Dizziness [R42]  Ataxia [R27.0]  Precautions:   Fall  PLOF: amb with RW w/o A per pt and spouse report; they also report h/o multiple falls tough w/o injury    ASSESSMENT :  Based on the objective data described below, the patient is seen on telemetry unit. Pt presents with h/o [place] Cerebellar ataxia per pt report since childhood with balance decr'g over the last 5 months. Pt with decr'd independence in functional mobility with regard to bed mobility/ transfers, gait quality/balance/coordination and decreased activity tolerance. Patient reports no pain this session. Demonstrates transition to sit EOB with SBA. Transfers to stand with CGA and able to complete GT/RW/min A ~28ft. Sheila slow, ataxic with decr'd balance posteriorly noted. Pt left up in chair at bedside with all needs in reach, and nurse Sadie Poster notified. Notified by nurse that pt HR tachy again post session and to possibly be medicated. Recommend rehab for follow up physical therapy upon discharge to reach maximal level of independence/safety with functional mobility. Patient will benefit from skilled intervention to address the above impairments.     Pt Education: Role of physical therapy in acute care setting, fall prevention and safety/technique during functional mobility tasks    Patient's rehabilitation potential is considered to be Fair  Factors which may influence rehabilitation potential include:   []         None noted  []         Mental ability/status  [x]         Medical condition  []         Home/family situation and support systems  []         Safety awareness  []         Pain tolerance/management  []         Other:      PLAN :  Recommendations and Planned Interventions:   [x]           Bed Mobility Training             [x]    Neuromuscular Re-Education  [x]           Transfer Training                   []    Orthotic/Prosthetic Training  [x]           Gait Training                          []    Modalities  [x]           Therapeutic Exercises           []    Edema Management/Control  [x]           Therapeutic Activities            []    Family Training/Education  [x]           Patient Education  []           Other (comment):    Frequency/Duration: Patient will be followed by physical therapy 1-2 times per day/4-7 days per week to address goals. Discharge Recommendations: Rehab  Further Equipment Recommendations for Discharge: N/A     SUBJECTIVE:   Patient stated I fee good.     OBJECTIVE DATA SUMMARY:     Past Medical History:   Diagnosis Date    Arthritis     Chronic pain     lower back    GERD (gastroesophageal reflux disease)     Ill-defined condition     Gita cerebella ataxia    Menopause     Age 47    Other ill-defined conditions(799.89)     Gita's Cerebellar Ataxia    Other ill-defined conditions(799.89)     Pessary    Psychiatric disorder     depression     Past Surgical History:   Procedure Laterality Date    HX CERVICAL FUSION  2012    anterior    HX GI      prolapse rectum    HX HEENT      OS muscle surgery    HX ORTHOPAEDIC      Right knee ORIF    HX ORTHOPAEDIC  2014    right total hip    HX OTHER SURGICAL      repair rectal prolapse     Barriers to Learning/Limitations: yes;  physical  Compensate with: Visual Cues, Verbal Cues, and Tactile Cues  Home Situation:  Home Situation  Home Environment: Private residence  Wheelchair Ramp: Yes  One/Two Story Residence: One story  Living Alone: No  Support Systems: Spouse/Significant Other  Patient Expects to be Discharged to[de-identified] Home  Current DME Used/Available at Home: Walker, rolling  Tub or Shower Type: Tub/Shower combination  Critical Behavior:  Neurologic State: Alert; Appropriate for age  Orientation Level: Oriented X4  Cognition: Follows commands  Safety/Judgement: Decreased insight into deficits; Decreased awareness of need for assistance  Psychosocial  Patient Behaviors: Calm; Cooperative  Purposeful Interaction: Yes  Pt Identified Daily Priority: Clinical issues (comment)  [employer] Process: Nurture loving kindness; Teaching/learning; Attend basic human needs  Caring Interventions: Reassure  Reassure: Therapeutic listening  Therapeutic Modalities: Humor  Strength:    Strength: Generally decreased, functional  Tone & Sensation:   Tone: Normal  Sensation: Intact  Range Of Motion:  AROM: Generally decreased, functional  Functional Mobility:  Bed Mobility:  Supine to Sit: Stand-by assistance  Sit to Supine: Stand-by assistance  Scooting: Stand-by assistance  Transfers:  Sit to Stand: Contact guard assistance  Stand to Sit: Contact guard assistance;Stand-by assistance  Bed to Chair: Contact guard assistance;Minimum assistance  Balance:   Sitting: Intact  Standing: Impaired; With support  Standing - Static: Fair;Constant support  Standing - Dynamic : Fair;Poor;Constant support  Ambulation/Gait Training:  Distance (ft): 28 Feet (ft)  Assistive Device: Gait belt;Walker, rolling  Ambulation - Level of Assistance: Minimal assistance  Gait Description (WDL): Exceptions to WDL  Gait Abnormalities: Ataxic;Decreased step clearance  Speed/Sheila: Slow  Step Length: Right shortened;Left shortened  Interventions: Safety awareness training; Tactile cues; Verbal cues; Visual/Demos  Pain:  Pain level pre-treatment: 0/10   Pain level post-treatment: 0/10   Pain Intervention(s) : Medication (see MAR); Rest, Ice, Repositioning  Response to intervention: Nurse notified, See doc flow  Activity Tolerance:   Fair   Please refer to the flowsheet for vital signs taken during this treatment. After treatment:   [x]         Patient left in no apparent distress sitting up in chair  []         Patient left in no apparent distress in bed  [x]         Call bell left within reach  [x]         Nursing notified  [x]         Caregiver present  []         Bed alarm activated  []         SCDs applied    COMMUNICATION/EDUCATION:   [x]         Role of Physical Therapy in the acute care setting. [x]         Fall prevention education was provided and the patient/caregiver indicated understanding. [x]         Patient/family have participated as able in goal setting and plan of care. [x]         Patient/family agree to work toward stated goals and plan of care. [x]         Patient understands intent and goals of therapy, but is neutral about his/her participation. []         Patient is unable to participate in goal setting/plan of care: ongoing with therapy staff.  []         Other:     Thank you for this referral.  Yasir Shah, PT   Time Calculation: 26 mins      Eval Complexity: History: HIGH Complexity :3+ comorbidities / personal factors will impact the outcome/ POC Exam:MEDIUM Complexity : 3 Standardized tests and measures addressing body structure, function, activity limitation and / or participation in recreation  Presentation: HIGH Complexity : Unstable and unpredictable characteristics  Clinical Decision Making:Medium Complexity    Overall Complexity:MEDIUM

## 2022-08-30 NOTE — PROGRESS NOTES
Cardiology Progress Note        Patient: Bony Key        Sex: female          DOA: 8/25/2022  YOB: 1945      Age:  65 y.o.        LOS:  LOS: 3 days . 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    Acute blood loss anemia D62    DDD (degenerative disc disease), lumbar M51.36    Spondylolisthesis of lumbar region M43.16    Scoliosis of thoracolumbar spine M41.9    SIRS (systemic inflammatory response syndrome) ([address] R65.10    History of back surgery Z98.890    Atelectasis J98.11    New onset a-fib ([address] I48.91    Atrial fibrillation ([address] I48.91    Dizziness R42    Ataxia R27.0    Constipated K59.00       Paroxysmal atrial fibrillation CHADsVASc score is 3.0    Echocardiogram revealed       Left Ventricle: Normal left ventricular systolic function with a visually estimated EF of 55 - 60%. Left ventricle size is normal. Normal wall thickness. Normal wall motion. Normal diastolic function. Aortic Valve: Not well visualized. Mildly calcified cusp. Mild to moderate regurgitation. No stenosis. Pulmonary Arteries: Pulmonary artery was not well visualized. The estimated PASP is 25 mmHg. Telemetry monitor revealed  currently she is in fibrillation and heart rate fluctuated 120-180 beats per minute    Plan:     Continue digoxin   Will give her IV digoxin every 6 hours for 2 doses. Start Full dose of Lovenox for now. Fall precaution    If patient has frequent episodes of atrial fibrillation will consider antiarrhythmics. Patient was told in the past that she should not take aspirin and she does not know the reason and she does not want to start it   Plan discussed with patient and family member and they verbally understood and agreed.               Subjective:    cc: Denies any palpitation or shortness of breath,   Complaining of few episodes of palpitation today      REVIEW OF SYSTEMS:     General: No fevers or chills. Cardiovascular: No chest pain,Positive palpitations, No orthopnea, No PND, No leg swelling, No claudication  Pulmonary: No  dyspnea. Gastrointestinal: No nausea, vomiting, bleeding  Neurology: No Dizziness    Objective:      Visit Vitals  /63 (BP 1 Location: Right upper arm, BP Patient Position: At rest;Lying left side)   Pulse 96   Temp 97.9 °F (36.6 °C)   Resp 16   Ht 5' (1.524 m)   Wt 49.5 kg (109 lb 1.6 oz)   SpO2 100%   BMI 21.31 kg/m²     Body mass index is 21.31 kg/m². Physical Exam:  General Appearance: Comfortable, not using accessory muscles of respiration. HEENT: FREDDY. HEAD: Atraumatic  NECK: No JVD, no thyroidomeglay. CAROTIDS: no bruit  LUNGS: Clear bilaterally. HEART: S1+S2 audible,  irregularly irregular, no murmur, no pericardial rub. ABD: Non-tender, BS Audible    EXT: No edema, and no cyanosis. VASCULAR EXAM: Pulses are intact. PSYCHIATRIC EXAM: Mood is appropriate. MUSCULOSKELETAL: Grossly no joint deformity.   NEUROLOGICAL: AAO times 3, No motor and sensory deficit    Medication:  Current Facility-Administered Medications   Medication Dose Route Frequency    [START ON 8/30/2022] pantoprazole (PROTONIX) tablet 40 mg  40 mg Oral ACB    digoxin (LANOXIN) injection 125 mcg  125 mcg IntraVENous NOW    enoxaparin (LOVENOX) injection 50 mg  1 mg/kg SubCUTAneous Q12H    sodium chloride (NS) flush 5-40 mL  5-40 mL IntraVENous Q8H    sodium chloride (NS) flush 5-40 mL  5-40 mL IntraVENous PRN    acetaminophen (TYLENOL) tablet 650 mg  650 mg Oral Q6H PRN    Or    acetaminophen (TYLENOL) suppository 650 mg  650 mg Rectal Q6H PRN    polyethylene glycol (MIRALAX) packet 17 g  17 g Oral DAILY PRN    ondansetron (ZOFRAN ODT) tablet 4 mg  4 mg Oral Q8H PRN    Or    ondansetron (ZOFRAN) injection 4 mg  4 mg IntraVENous Q6H PRN gabapentin (NEURONTIN) capsule 400 mg  400 mg Oral QHS    ascorbic acid (vitamin C) (VITAMIN C) tablet 500 mg  500 mg Oral DAILY    polyethylene glycol (MIRALAX) packet 17 g  17 g Oral BID    senna-docusate (PERICOLACE) 8.6-50 mg per tablet 1 Tablet  1 Tablet Oral DAILY    bisacodyL (DULCOLAX) suppository 10 mg  10 mg Rectal DAILY PRN               Lab/Data Reviewed:       Recent Labs     08/29/22  0145 08/28/22  0146 08/27/22  0640   WBC 5.6 5.8 4.9   HGB 10.8* 10.4* 11.7*   HCT 34.0* 33.0* 36.7    196 236       Recent Labs     08/29/22 0145 08/28/22  0146 08/27/22  0640    140 144   K 4.0 3.6 4.0    109 110   CO2 29 28 27   GLU 95 99 94   BUN 23* 18 11   CREA 0.52* 0.52* 0.48*   CA 9.5 8.9 9.0         Signed By: Juany Draper MD     August 29, 2022

## 2022-08-30 NOTE — PROGRESS NOTES
Daughter, Ronel Ty requests phone call to discuss placement with CM. Phone number on chart.  Will leave message on CM voicemail to notify of request.      Pt has history of peptic ulcer per daughter, was advised to avoid ASA, motrin, etc

## 2022-08-30 NOTE — PROGRESS NOTES
Hospitalist Progress Note-critical care note     Patient: Corinne Hove MRN: [id number]  CSN: [id number]    YOB: 1945  Age: 65 y.o. Sex: female    DOA: 8/25/2022 LOS:  LOS: 4 days            Chief complaint: afib, dizziness , ataxia , constipated     Assessment/Plan         Hospital Problems  Date Reviewed: 4/4/2018            Codes Class Noted POA    * (Principal) Atrial fibrillation ([facility] ICD-10-CM: I48.91  ICD-9-CM: 427.31  8/26/2022 Unknown        Dizziness ICD-10-CM: R42  ICD-9-CM: 780.4  8/26/2022 Unknown        Ataxia ICD-10-CM: R27.0  ICD-9-CM: 781.3  8/26/2022 Unknown        Constipated ICD-10-CM: K59.00  ICD-9-CM: 564.00  8/26/2022 Yes            Atrial flutter with rapid ventricular response   On iv  digoxin 0.125 mg by mouth once a day per cardiologist -discussed with Dr. Aura Thornton   Echo: Normal left ventricular systolic function with a visually estimated EF of 55 - 60%  candidate for long-term oral anticoagulation therapy but due to frequent falls will not start any anticoagulation therapy  Hr relatively control      Frequent UTIs with history of pessary and fecal impaction   Constipation better     Chronic Cerebellar ataxia -PT eval-recommend rehab   Mri brain:   Profound cerebellar atrophy which is out of proportion to the degree of  cerebral atrophy on 8/12       Hypotension -improved     Constipation -improving, continue mirlax pericolace       Subjective : I feel fine, no palpitation and chest discomfort   was at the bedside    All questions have been answered. 35 total min's spent on patient care including >50% on counseling/coordinating care. Discussed the above assessments. also discussed labs, medications and hospital course    Disposition :1-2 days   Review of systems:    General: No fevers or chills. Cardiovascular: No chest pain or pressure. No palpitations. Pulmonary: No shortness of breath. Gastrointestinal: No nausea, vomiting.      Vital signs/Intake and Output:  Visit Vitals  BP (!) 108/48   Pulse 97   Temp 97.4 °F (36.3 °C)   Resp 15   Ht 5' (1.524 m)   Wt 49.5 kg (109 lb 1.6 oz)   SpO2 98%   BMI 21.31 kg/m²     Current Shift:  No intake/output data recorded. Last three shifts:  08/28 1901 - 08/30 0700  In: -   Out: 400 [Urine:400]    Physical Exam:  General: WD, WN. Alert, cooperative, no acute distress    HEENT: NC, Atraumatic. PERRLA, anicteric sclerae. Lungs: CTA Bilaterally. No Wheezing/Rhonchi/Rales. Heart:  Regular  rhythm,  +murmur, No Rubs, No Gallops  Abdomen: Soft, Non distended, Non tender. +Bowel sounds,   Extremities: No c/c/e  Psych:   Not anxious or agitated. Neurologic:  No acute neurological deficit. Labs: Results:       Chemistry Recent Labs     08/30/22 0225 08/29/22 0145 08/28/22 0146   GLU 94 95 99    140 140   K 3.8 4.0 3.6    108 109   CO2 25 29 28   BUN 19* 23* 18   CREA 0.51* 0.52* 0.52*   CA 9.6 9.5 8.9   AGAP 6 3 3   BUCR 37* 44* 35*   AP 65 57 54   TP 6.7 6.5 6.4   ALB 3.6 3.5 3.7   GLOB 3.1 3.0 2.7   AGRAT 1.2 1.2 1.4      CBC w/Diff Recent Labs     08/30/22 0225 08/29/22 0145 08/28/22 0146   WBC 7.1 5.6 5.8   RBC 3.91* 3.25* 3.17*   HGB 12.8 10.8* 10.4*   HCT 39.8 34.0* 33.0*    208 196   GRANS 68 66 67   LYMPH 18* 19* 19*   EOS 2 4 4      Cardiac Enzymes No results for input(s): CPK, CKND1, ELEONORA in the last 72 hours. No lab exists for component: CKRMB, TROIP   Coagulation No results for input(s): PTP, INR, APTT, INREXT in the last 72 hours. Lipid Panel No results found for: CHOL, CHOLPOCT, CHOLX, CHLST, CHOLV, 707843, HDL, HDLP, LDL, LDLC, DLDLP, 571628, VLDLC, VLDL, TGLX, TRIGL, TRIGP, TGLPOCT, CHHD, CHHDX   BNP No results for input(s): BNPP in the last 72 hours.    Liver Enzymes Recent Labs     08/30/22  0225   TP 6.7   ALB 3.6   AP 65      Thyroid Studies Lab Results   Component Value Date/Time    TSH 1.31 08/26/2022 12:32 AM        Procedures/imaging: see electronic medical records for all procedures/Xrays and details which were not copied into this note but were reviewed prior to creation of Plan    XR ABD (KUB)    Result Date: 8/12/2022  EXAM: XR ABD (KUB) CLINICAL INDICATION/HISTORY: constipation   > Additional: None. COMPARISON: January 8, 2020 TECHNIQUE: Supine abdominal radiograph obtained. _______________ FINDINGS: BOWEL GAS PATTERN: There is moderate to large amount of stool throughout the colon. No free intraperitoneal air. SOFT TISSUES: Unremarkable. BONES: Moderate S-shaped scoliotic curvature of the thoracolumbar spine. Osteopenia. Surgical changes in keeping with prior lumbosacral fusion with bilateral pedicle screws and rods spanning L3-S1 and laminectomy changes at L4 and L5. Right total hip arthroplasty hardware also noted. ADDITIONAL FINDINGS: None. _______________     1. Moderate to large amount of stool throughout the colon. 2. Additional chronic findings as above. MRI BRAIN WO CONT    Result Date: 8/12/2022  EXAM: MRI brain without contrast 8/12/2022. CLINICAL INDICATION/HISTORY: Cerebellar ataxia. COMPARISON: CT scan of the head from 8/12/2022 TECHNIQUE: Multiplanar multisequential images of the brain were obtained without contrast. _______________ FINDINGS: BRAIN AND POSTERIOR FOSSA: There is severe, diffuse cerebellar atrophy. Mild, diffuse cerebral atrophy is present. Minimal chronic small vessel changes are present in the periventricular and subcortical white matter of both cerebral hemispheres. There is no intracranial hemorrhage, mass effect, or midline shift. There are no significant additional areas of abnormal parenchymal signal. There are no areas of restricted diffusion to suggest acute infarct. EXTRA-AXIAL SPACES AND MENINGES: There are no abnormal extra-axial fluid collections. Chris Martinez VASCULAR: The visualized portions of the intracranial carotid and vertebrobasilar systems demonstrate patent appearing flow voids. CALVARIA: Unremarkable.  SINUSES: Clear, as visualized. CRANIOCERVICAL JUNCTION: Degenerative and postsurgical changes are noted in the upper cervical spine but are incompletely evaluated on this examination of the brain. OTHER: None. _______________     1. Profound cerebellar atrophy which is out of proportion to the degree of cerebral atrophy. This can be seen in the setting of long-term anticonvulsant therapy (Dilantin), related to long-standing ethanol abuse, and/or related to a neurodegenerative process. 2.  Mild cerebral atrophy and chronic small vessel changes. 3.  No acute intracranial abnormalities are identified. CT HEAD WO CONT    Result Date: 8/12/2022  EXAM: CT HEAD WO CONT CLINICAL INDICATION/HISTORY: ams COMPARISON: None. TECHNIQUE: Axial CT imaging of the head was performed without intravenous contrast. Sagittal and coronal reconstructions are provided. One or more dose reduction techniques were used on this CT: automated exposure control, adjustment of the mAs and/or kVp according to patient size, and iterative reconstruction techniques. The specific techniques used on this CT exam have been documented in the patient's electronic medical record. Digital Imaging and Communications in Medicine (DICOM) format image data are available to nonaffiliated external healthcare facilities or entities on a secure, media free, reciprocally searchable basis with patient authorization for at least a 12-month period after this study _______________ FINDINGS: BRAIN AND POSTERIOR FOSSA: There is mild generalized cortical atrophy with access disproportionate volume loss in the cerebellum. There is no intracranial hemorrhage, mass effect, or midline shift. There are no areas of abnormal parenchymal attenuation. EXTRA-AXIAL SPACES AND MENINGES: There are no abnormal extra-axial fluid collections. CALVARIUM: Intact. SINUSES: Clear. OTHER: Chronic bilateral lens replacements. _______________     No acute intracranial abnormalities.  Chronic disproportionate cerebellar atrophy. CTA CHEST W OR W WO CONT    Result Date: 8/26/2022  EXAM: CTA CHEST W OR W WO CONT CLINICAL INDICATION/HISTORY: afib rvr -Additional: None COMPARISON: 04/06/2018, 1/8/2020 TECHNIQUE: Axial CT imaging from the thoracic inlet through the diaphragm with intravenous contrast. Coronal and sagittal MIP reformats were generated. One or more dose reduction techniques were used on this CT: automated exposure control, adjustment of the mAs and/or kVp according to patient size, and iterative reconstruction techniques. The specific techniques used on this CT exam have been documented in the patient's electronic medical record. Digital Imaging and Communications in Medicine (DICOM) format image data are available to nonaffiliated external healthcare facilities or entities on a secure, media free, reciprocally searchable basis with patient authorization for at least a 12-month period after this study. _______________ FINDINGS: EXAM QUALITY: Adequate. PULMONARY ARTERIES: No evidence of pulmonary embolism. MEDIASTINUM: Normal heart size without pericardial effusion. Mild aortic atherosclerosis without aneurysm. LUNGS: No suspicious nodule or mass. No abnormal opacities. PLEURA: Normal. AIRWAY: Centrally patent. LYMPH NODES: No enlarged nodes. UPPER ABDOMEN: Please see separate dictated CT scan of the abdomen and pelvis from same day. . OTHER: Thoracolumbar levorotoscoliosis with stable chronic L1 and T11 superior endplate compression deformities. No acute or aggressive osseous abnormalities identified. _______________     1. No evidence of pulmonary embolism. 2.  No acute pulmonary parenchymal process. CT ABD PELV W CONT    Result Date: 8/26/2022  EXAM: CT ABD PELV W CONT CLINICAL INDICATION/HISTORY: ileus fecal impaction -Additional: None COMPARISON: 1/8/2020 TECHNIQUE: Axial CT imaging of the abdomen and pelvis was performed with intravenous contrast. Multiplanar reformats were generated.  One or more dose reduction techniques were used on this CT: automated exposure control, adjustment of the mAs and/or kVp according to patient size, and iterative reconstruction techniques. The specific techniques used on this CT exam have been documented in the patient's electronic medical record. Digital Imaging and Communications in Medicine (DICOM) format image data are available to nonaffiliated external healthcare facilities or entities on a secure, media free, reciprocally searchable basis with patient authorization for at least a 12-month period after this study. _______________ FINDINGS: LOWER CHEST: Please see separate dictated CT scan of the abdomen and pelvis from same day LIVER, BILIARY: No acute or suspicious hepatic finding. Stable subcapsular hepatic hypodensity, too small to characterize by CT criteria statistically represent benign cysts. No biliary dilation. Gallbladder is unremarkable. PANCREAS: Unremarkable. SPLEEN: Normal. ADRENALS: Normal. KIDNEYS, URETERS, BLADDER: Symmetric enhancing without hydronephrosis or induration. No hydroureter. Degraded evaluation of the bladder secondary right hip streak artifact. PELVIC ORGANS: Right acute artifact degrades the mid inferior right pelvis. Otherwise, unremarkable GASTROINTESTINAL TRACT: No bowel dilation or wall thickening. Large volume stool in the colon from the cecum to the left colon without focal transition point. Surgical changes of prior distal colectomy. Edematous thick-walled appearance of the underdistended stomach. LYMPH NODES: No enlarged lymph nodes. VASCULATURE: Mild aortic atherosclerosis OSSEOUS: No acute or aggressive osseous abnormalities identified. Levoscoliosis with prior dorsal spinous fixation hardware from L3 to S1 with stable minimal anterolisthesis at L3-4. Stable chronic T11 and L1 vertebral body superior endplate compression deformities.] Total arthroplasty OTHER: None. _______________     1.   Thick-walled stomach which may represent underdistention versus nonspecific infectious inflammatory gastritis. 2. Large volume of stool from the cecum to the left colon, suggestive of constipation. XR CHEST PORT    Result Date: 8/25/2022  CLINICAL: Dizziness. Atrial fibrillation. COMPARISON: January 8, 2020. A portable view of the chest from 2127 hours: Skinfold over the right chest. Lungs are clear. Pleural spaces are clear Scoliosis. Prominent degenerative changes in both shoulders. No acute pulmonary process    ECHO ADULT COMPLETE    Result Date: 8/26/2022  Formatting of this result is different from the original.   Left Ventricle: Normal left ventricular systolic function with a visually estimated EF of 55 - 60%. Left ventricle size is normal. Normal wall thickness. Normal wall motion. Normal diastolic function. Aortic Valve: Not well visualized. Mildly calcified cusp. Mild to moderate regurgitation. No stenosis. Pulmonary Arteries: Pulmonary artery was not well visualized. The estimated PASP is 25 mmHg.        Vipul Chery MD

## 2022-08-31 PROCEDURE — 74011000250 HC RX REV CODE- 250: Performed by: INTERNAL MEDICINE

## 2022-08-31 PROCEDURE — 97530 THERAPEUTIC ACTIVITIES: CPT

## 2022-08-31 PROCEDURE — 74011250637 HC RX REV CODE- 250/637: Performed by: HOSPITALIST

## 2022-08-31 PROCEDURE — 74011250637 HC RX REV CODE- 250/637: Performed by: INTERNAL MEDICINE

## 2022-08-31 PROCEDURE — 65270000046 HC RM TELEMETRY

## 2022-08-31 PROCEDURE — 97116 GAIT TRAINING THERAPY: CPT

## 2022-08-31 PROCEDURE — 74011250636 HC RX REV CODE- 250/636: Performed by: INTERNAL MEDICINE

## 2022-08-31 RX ADMIN — PANTOPRAZOLE SODIUM 40 MG: 40 TABLET, DELAYED RELEASE ORAL at 06:11

## 2022-08-31 RX ADMIN — SODIUM CHLORIDE, PRESERVATIVE FREE 10 ML: 5 INJECTION INTRAVENOUS at 16:21

## 2022-08-31 RX ADMIN — GABAPENTIN 400 MG: 400 CAPSULE ORAL at 21:26

## 2022-08-31 RX ADMIN — Medication 500 MG: at 09:55

## 2022-08-31 RX ADMIN — POLYETHYLENE GLYCOL 3350 17 G: 17 POWDER, FOR SOLUTION ORAL at 21:26

## 2022-08-31 RX ADMIN — DIGOXIN 125 MCG: 250 INJECTION, SOLUTION INTRAMUSCULAR; INTRAVENOUS at 09:55

## 2022-08-31 RX ADMIN — Medication 1 TABLET: at 09:55

## 2022-08-31 RX ADMIN — POLYETHYLENE GLYCOL 3350 17 G: 17 POWDER, FOR SOLUTION ORAL at 09:55

## 2022-08-31 RX ADMIN — ENOXAPARIN SODIUM 50 MG: 100 INJECTION SUBCUTANEOUS at 09:54

## 2022-08-31 RX ADMIN — ENOXAPARIN SODIUM 50 MG: 100 INJECTION SUBCUTANEOUS at 21:26

## 2022-08-31 RX ADMIN — SODIUM CHLORIDE, PRESERVATIVE FREE 10 ML: 5 INJECTION INTRAVENOUS at 22:00

## 2022-08-31 RX ADMIN — SODIUM CHLORIDE, PRESERVATIVE FREE 10 ML: 5 INJECTION INTRAVENOUS at 06:10

## 2022-08-31 NOTE — PROGRESS NOTES
Hospitalist Progress Note-critical care note     Patient: Miesha Rm MRN: [id number]  CSN: [id number]    YOB: 1945  Age: 65 y.o. Sex: female    DOA: 8/25/2022 LOS:  LOS: 5 days            Chief complaint: afib, dizziness , ataxia , constipated     Assessment/Plan         Hospital Problems  Date Reviewed: 4/4/2018            Codes Class Noted POA    * (Principal) Atrial fibrillation ([facility] [address] ICD-10-CM: I48.91  ICD-9-CM: 427.31  8/26/2022 Unknown        Dizziness ICD-10-CM: R42  ICD-9-CM: 780.4  8/26/2022 Unknown        Ataxia ICD-10-CM: R27.0  ICD-9-CM: 781.3  8/26/2022 Unknown        Constipated ICD-10-CM: K59.00  ICD-9-CM: 564.00  8/26/2022 Yes          Atrial flutter with rapid ventricular response   HR controlled overnight, will continue monitoring   On iv  digoxin 0.125 mg once daily-will see cardiologist final recommendation   Echo: Normal left ventricular systolic function with a visually estimated EF of 55 - 60%  candidate for long-term oral anticoagulation therapy but due to frequent falls will not start any anticoagulation therapy       Frequent UTIs with history of pessary and fecal impaction   Constipation better     Chronic Cerebellar ataxia -PT eval-recommend rehab   Continue fall precaution   Mri brain:   Profound cerebellar atrophy which is out of proportion to the degree of  cerebral atrophy on 8/12       Hypotension -improved     Constipation -improving, continue mirlax pericolace       Subjective : I  feel better, no sob     Will continue  weaning off oxygen      was at the bedside. All questions have been answered. 35 total min's spent on patient care including >50% on counseling/coordinating care. Discussed the above assessments. also discussed labs, medications and hospital course    Disposition :need cardiologist clear to be discharge   Review of systems:    General: No fevers or chills. Cardiovascular: No chest pain or pressure. No palpitations.    Pulmonary: No shortness of breath. Gastrointestinal: No nausea, vomiting. Vital signs/Intake and Output:  Visit Vitals  BP (!) 115/47   Pulse 63   Temp 98.3 °F (36.8 °C)   Resp 16   Ht 5' (1.524 m)   Wt 49.8 kg (109 lb 12.8 oz)   SpO2 100%   BMI 21.44 kg/m²     Current Shift:  08/31 0701 - 08/31 1900  In: 120 [P.O.:120]  Out: -   Last three shifts:  08/29 1901 - 08/31 0700  In: 26 [P.O.:460]  Out: 200 [Urine:200]    Physical Exam:  General: WD, WN. Alert, cooperative, no acute distress    HEENT: NC, Atraumatic. PERRLA, anicteric sclerae. Lungs: CTA Bilaterally. No Wheezing/Rhonchi/Rales. Heart:  Regular  rhythm,  +murmur, No Rubs, No Gallops  Abdomen: Soft, Non distended, Non tender. +Bowel sounds,   Extremities: No c/c/e  Psych:   Not anxious or agitated. Neurologic:  No acute neurological deficit. Labs: Results:       Chemistry Recent Labs     08/30/22 0225 08/29/22  0145   GLU 94 95    140   K 3.8 4.0    108   CO2 25 29   BUN 19* 23*   CREA 0.51* 0.52*   CA 9.6 9.5   AGAP 6 3   BUCR 37* 44*   AP 65 57   TP 6.7 6.5   ALB 3.6 3.5   GLOB 3.1 3.0   AGRAT 1.2 1.2        CBC w/Diff Recent Labs     08/30/22 0225 08/29/22  0145   WBC 7.1 5.6   RBC 3.91* 3.25*   HGB 12.8 10.8*   HCT 39.8 34.0*    208   GRANS 68 66   LYMPH 18* 19*   EOS 2 4        Cardiac Enzymes No results for input(s): CPK, CKND1, ELEONORA in the last 72 hours. No lab exists for component: CKRMB, TROIP   Coagulation No results for input(s): PTP, INR, APTT, INREXT, INREXT in the last 72 hours. Lipid Panel No results found for: CHOL, CHOLPOCT, CHOLX, CHLST, CHOLV, 693800, HDL, HDLP, LDL, LDLC, DLDLP, [id number], VLDLC, VLDL, TGLX, TRIGL, TRIGP, TGLPOCT, CHHD, CHHDX   BNP No results for input(s): BNPP in the last 72 hours.    Liver Enzymes Recent Labs     08/30/22  0225   TP 6.7   ALB 3.6   AP 65        Thyroid Studies Lab Results   Component Value Date/Time    TSH 1.31 08/26/2022 12:32 AM        Procedures/imaging: see electronic medical records for all procedures/Xrays and details which were not copied into this note but were reviewed prior to creation of Plan    XR ABD (KUB)    Result Date: 8/12/2022  EXAM: XR ABD (KUB) CLINICAL INDICATION/HISTORY: constipation   > Additional: None. COMPARISON: January 8, 2020 TECHNIQUE: Supine abdominal radiograph obtained. _______________ FINDINGS: BOWEL GAS PATTERN: There is moderate to large amount of stool throughout the colon. No free intraperitoneal air. SOFT TISSUES: Unremarkable. BONES: Moderate S-shaped scoliotic curvature of the thoracolumbar spine. Osteopenia. Surgical changes in keeping with prior lumbosacral fusion with bilateral pedicle screws and rods spanning L3-S1 and laminectomy changes at L4 and L5. Right total hip arthroplasty hardware also noted. ADDITIONAL FINDINGS: None. _______________     1. Moderate to large amount of stool throughout the colon. 2. Additional chronic findings as above. MRI BRAIN WO CONT    Result Date: 8/12/2022  EXAM: MRI brain without contrast 8/12/2022. CLINICAL INDICATION/HISTORY: Cerebellar ataxia. COMPARISON: CT scan of the head from 8/12/2022 TECHNIQUE: Multiplanar multisequential images of the brain were obtained without contrast. _______________ FINDINGS: BRAIN AND POSTERIOR FOSSA: There is severe, diffuse cerebellar atrophy. Mild, diffuse cerebral atrophy is present. Minimal chronic small vessel changes are present in the periventricular and subcortical white matter of both cerebral hemispheres. There is no intracranial hemorrhage, mass effect, or midline shift. There are no significant additional areas of abnormal parenchymal signal. There are no areas of restricted diffusion to suggest acute infarct. EXTRA-AXIAL SPACES AND MENINGES: There are no abnormal extra-axial fluid collections. Kevin Ramos VASCULAR: The visualized portions of the intracranial carotid and vertebrobasilar systems demonstrate patent appearing flow voids.  CALVARIA: Unremarkable. SINUSES: Clear, as visualized. CRANIOCERVICAL JUNCTION: Degenerative and postsurgical changes are noted in the upper cervical spine but are incompletely evaluated on this examination of the brain. OTHER: None. _______________     1. Profound cerebellar atrophy which is out of proportion to the degree of cerebral atrophy. This can be seen in the setting of long-term anticonvulsant therapy (Dilantin), related to long-standing ethanol abuse, and/or related to a neurodegenerative process. 2.  Mild cerebral atrophy and chronic small vessel changes. 3.  No acute intracranial abnormalities are identified. CT HEAD WO CONT    Result Date: 8/12/2022  EXAM: CT HEAD WO CONT CLINICAL INDICATION/HISTORY: ams COMPARISON: None. TECHNIQUE: Axial CT imaging of the head was performed without intravenous contrast. Sagittal and coronal reconstructions are provided. One or more dose reduction techniques were used on this CT: automated exposure control, adjustment of the mAs and/or kVp according to patient size, and iterative reconstruction techniques. The specific techniques used on this CT exam have been documented in the patient's electronic medical record. Digital Imaging and Communications in Medicine (DICOM) format image data are available to nonaffiliated external healthcare facilities or entities on a secure, media free, reciprocally searchable basis with patient authorization for at least a 12-month period after this study _______________ FINDINGS: BRAIN AND POSTERIOR FOSSA: There is mild generalized cortical atrophy with access disproportionate volume loss in the cerebellum. There is no intracranial hemorrhage, mass effect, or midline shift. There are no areas of abnormal parenchymal attenuation. EXTRA-AXIAL SPACES AND MENINGES: There are no abnormal extra-axial fluid collections. CALVARIUM: Intact. SINUSES: Clear.  OTHER: Chronic bilateral lens replacements. _______________     No acute intracranial abnormalities. Chronic disproportionate cerebellar atrophy. CTA CHEST W OR W WO CONT    Result Date: 8/26/2022  EXAM: CTA CHEST W OR W WO CONT CLINICAL INDICATION/HISTORY: afib rvr -Additional: None COMPARISON: 04/06/2018, 1/8/2020 TECHNIQUE: Axial CT imaging from the thoracic inlet through the diaphragm with intravenous contrast. Coronal and sagittal MIP reformats were generated. One or more dose reduction techniques were used on this CT: automated exposure control, adjustment of the mAs and/or kVp according to patient size, and iterative reconstruction techniques. The specific techniques used on this CT exam have been documented in the patient's electronic medical record. Digital Imaging and Communications in Medicine (DICOM) format image data are available to nonaffiliated external healthcare facilities or entities on a secure, media free, reciprocally searchable basis with patient authorization for at least a 12-month period after this study. _______________ FINDINGS: EXAM QUALITY: Adequate. PULMONARY ARTERIES: No evidence of pulmonary embolism. MEDIASTINUM: Normal heart size without pericardial effusion. Mild aortic atherosclerosis without aneurysm. LUNGS: No suspicious nodule or mass. No abnormal opacities. PLEURA: Normal. AIRWAY: Centrally patent. LYMPH NODES: No enlarged nodes. UPPER ABDOMEN: Please see separate dictated CT scan of the abdomen and pelvis from same day. . OTHER: Thoracolumbar levorotoscoliosis with stable chronic L1 and T11 superior endplate compression deformities. No acute or aggressive osseous abnormalities identified. _______________     1. No evidence of pulmonary embolism. 2.  No acute pulmonary parenchymal process.     CT ABD PELV W CONT    Result Date: 8/26/2022  EXAM: CT ABD PELV W CONT CLINICAL INDICATION/HISTORY: ileus fecal impaction -Additional: None COMPARISON: 1/8/2020 TECHNIQUE: Axial CT imaging of the abdomen and pelvis was performed with intravenous contrast. Multiplanar reformats were generated. One or more dose reduction techniques were used on this CT: automated exposure control, adjustment of the mAs and/or kVp according to patient size, and iterative reconstruction techniques. The specific techniques used on this CT exam have been documented in the patient's electronic medical record. Digital Imaging and Communications in Medicine (DICOM) format image data are available to nonaffiliated external healthcare facilities or entities on a secure, media free, reciprocally searchable basis with patient authorization for at least a 12-month period after this study. _______________ FINDINGS: LOWER CHEST: Please see separate dictated CT scan of the abdomen and pelvis from same day LIVER, BILIARY: No acute or suspicious hepatic finding. Stable subcapsular hepatic hypodensity, too small to characterize by CT criteria statistically represent benign cysts. No biliary dilation. Gallbladder is unremarkable. PANCREAS: Unremarkable. SPLEEN: Normal. ADRENALS: Normal. KIDNEYS, URETERS, BLADDER: Symmetric enhancing without hydronephrosis or induration. No hydroureter. Degraded evaluation of the bladder secondary right hip streak artifact. PELVIC ORGANS: Right acute artifact degrades the mid inferior right pelvis. Otherwise, unremarkable GASTROINTESTINAL TRACT: No bowel dilation or wall thickening. Large volume stool in the colon from the cecum to the left colon without focal transition point. Surgical changes of prior distal colectomy. Edematous thick-walled appearance of the underdistended stomach. LYMPH NODES: No enlarged lymph nodes. VASCULATURE: Mild aortic atherosclerosis OSSEOUS: No acute or aggressive osseous abnormalities identified. Levoscoliosis with prior dorsal spinous fixation hardware from L3 to S1 with stable minimal anterolisthesis at L3-4. Stable chronic T11 and L1 vertebral body superior endplate compression deformities.] Total arthroplasty OTHER: None. _______________     1. Thick-walled stomach which may represent underdistention versus nonspecific infectious inflammatory gastritis. 2. Large volume of stool from the cecum to the left colon, suggestive of constipation. XR CHEST PORT    Result Date: 8/25/2022  CLINICAL: Dizziness. Atrial fibrillation. COMPARISON: January 8, 2020. A portable view of the chest from 2127 hours: Skinfold over the right chest. Lungs are clear. Pleural spaces are clear Scoliosis. Prominent degenerative changes in both shoulders. No acute pulmonary process    ECHO ADULT COMPLETE    Result Date: 8/26/2022  Formatting of this result is different from the original.   Left Ventricle: Normal left ventricular systolic function with a visually estimated EF of 55 - 60%. Left ventricle size is normal. Normal wall thickness. Normal wall motion. Normal diastolic function. Aortic Valve: Not well visualized. Mildly calcified cusp. Mild to moderate regurgitation. No stenosis. Pulmonary Arteries: Pulmonary artery was not well visualized. The estimated PASP is 25 mmHg.        Thu Joy MD

## 2022-08-31 NOTE — PROGRESS NOTES
Cardiology Progress Note        Patient: Kaye Payan        Sex: female          DOA: 8/25/2022  YOB: 1945      Age:  65 y.o.        LOS:  LOS: 4 days . 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    Acute blood loss anemia D62    DDD (degenerative disc disease), lumbar M51.36    Spondylolisthesis of lumbar region M43.16    Scoliosis of thoracolumbar spine M41.9    SIRS (systemic inflammatory response syndrome) ([address] R65.10    History of back surgery Z98.890    Atelectasis J98.11    New onset a-fib ([address] I48.91    Atrial fibrillation ([address] I48.91    Dizziness R42    Ataxia R27.0    Constipated K59.00       Paroxysmal atrial fibrillation CHADsVASc score is 3.0    Echocardiogram revealed       Left Ventricle: Normal left ventricular systolic function with a visually estimated EF of 55 - 60%. Left ventricle size is normal. Normal wall thickness. Normal wall motion. Normal diastolic function. Aortic Valve: Not well visualized. Mildly calcified cusp. Mild to moderate regurgitation. No stenosis. Pulmonary Arteries: Pulmonary artery was not well visualized. The estimated PASP is 25 mmHg. Telemetry monitor revealed  currently she is in sinus rhythm. She had episode of atrial fibrillation this morning    Plan:    Continue digoxin  Continue Full dose of Lovenox for now. Fall precaution  If patient has frequent episodes of atrial fibrillation will consider antiarrhythmics. Plan discussed with patient and family member and they verbally understood and agreed. Subjective:    cc:    Denies any palpitation or shortness of breath    REVIEW OF SYSTEMS:     General: No fevers or chills.   Cardiovascular: No chest pain,Positive palpitations, No orthopnea, No PND, No leg swelling, No claudication  Pulmonary: No  dyspnea. Gastrointestinal: No nausea, vomiting, bleeding  Neurology: No Dizziness    Objective:      Visit Vitals  BP (!) 99/55   Pulse 67   Temp 98 °F (36.7 °C)   Resp 16   Ht 5' (1.524 m)   Wt 49.5 kg (109 lb 1.6 oz)   SpO2 100%   BMI 21.31 kg/m²     Body mass index is 21.31 kg/m². Physical Exam:  General Appearance: Comfortable, not using accessory muscles of respiration. HEENT: FREDDY. HEAD: Atraumatic  NECK: No JVD, no thyroidomeglay. CAROTIDS: no bruit  LUNGS: Clear bilaterally. HEART: S1+S2 audible,  irregularly irregular, no murmur, no pericardial rub. ABD: Non-tender, BS Audible    EXT: No edema, and no cyanosis. VASCULAR EXAM: Pulses are intact. PSYCHIATRIC EXAM: Mood is appropriate. MUSCULOSKELETAL: Grossly no joint deformity.   NEUROLOGICAL: AAO times 3, No motor and sensory deficit    Medication:  Current Facility-Administered Medications   Medication Dose Route Frequency    pantoprazole (PROTONIX) tablet 40 mg  40 mg Oral ACB    enoxaparin (LOVENOX) injection 50 mg  1 mg/kg SubCUTAneous Q12H    digoxin (LANOXIN) injection 125 mcg  125 mcg IntraVENous DAILY    sodium chloride (NS) flush 5-40 mL  5-40 mL IntraVENous Q8H    sodium chloride (NS) flush 5-40 mL  5-40 mL IntraVENous PRN    acetaminophen (TYLENOL) tablet 650 mg  650 mg Oral Q6H PRN    Or    acetaminophen (TYLENOL) suppository 650 mg  650 mg Rectal Q6H PRN    polyethylene glycol (MIRALAX) packet 17 g  17 g Oral DAILY PRN    ondansetron (ZOFRAN ODT) tablet 4 mg  4 mg Oral Q8H PRN    Or    ondansetron (ZOFRAN) injection 4 mg  4 mg IntraVENous Q6H PRN    gabapentin (NEURONTIN) capsule 400 mg  400 mg Oral QHS    ascorbic acid (vitamin C) (VITAMIN C) tablet 500 mg  500 mg Oral DAILY    polyethylene glycol (MIRALAX) packet 17 g  17 g Oral BID    senna-docusate (PERICOLACE) 8.6-50 mg per tablet 1 Tablet  1 Tablet Oral DAILY    bisacodyL (DULCOLAX) suppository 10 mg  10 mg Rectal DAILY PRN Lab/Data Reviewed:       Recent Labs     08/30/22 0225 08/29/22 0145 08/28/22 0146   WBC 7.1 5.6 5.8   HGB 12.8 10.8* 10.4*   HCT 39.8 34.0* 33.0*    208 196       Recent Labs     08/30/22 0225 08/29/22 0145 08/28/22 0146    140 140   K 3.8 4.0 3.6    108 109   CO2 25 29 28   GLU 94 95 99   BUN 19* 23* 18   CREA 0.51* 0.52* 0.52*   CA 9.6 9.5 8.9         Signed By: Alexandre Perry MD     August 30, 2022

## 2022-08-31 NOTE — PROGRESS NOTES
Problem: Falls - Risk of  Goal: *Absence of Falls  Description: Document Berna Villegas Fall Risk and appropriate interventions in the flowsheet.   Outcome: Progressing Towards Goal  Note: Fall Risk Interventions:  Mobility Interventions: Assess mobility with egress test, Communicate number of staff needed for ambulation/transfer, Patient to call before getting OOB, PT Consult for mobility concerns, Utilize walker, cane, or other assistive device         Medication Interventions: Evaluate medications/consider consulting pharmacy, Patient to call before getting OOB, Teach patient to arise slowly    Elimination Interventions: Call light in reach, Patient to call for help with toileting needs, Stay With Me (per policy), Toilet paper/wipes in reach, Toileting schedule/hourly rounds    History of Falls Interventions: Consult care management for discharge planning, Door open when patient unattended, Evaluate medications/consider consulting pharmacy, Investigate reason for fall, Room close to nurse's station         Problem: Patient Education: Go to Patient Education Activity  Goal: Patient/Family Education  Outcome: Progressing Towards Goal     Problem: General Medical Care Plan  Goal: *Vital signs within specified parameters  Outcome: Progressing Towards Goal  Goal: *Labs within defined limits  Outcome: Progressing Towards Goal  Goal: *Absence of infection signs and symptoms  Outcome: Progressing Towards Goal  Goal: *Optimal pain control at patient's stated goal  Outcome: Progressing Towards Goal  Goal: *Skin integrity maintained  Outcome: Progressing Towards Goal  Goal: *Fluid volume balance  Outcome: Progressing Towards Goal  Goal: *Optimize nutritional status  Outcome: Progressing Towards Goal  Goal: *Anxiety reduced or absent  Outcome: Progressing Towards Goal  Goal: *Progressive mobility and function (eg: ADL's)  Outcome: Progressing Towards Goal     Problem: Patient Education: Go to Patient Education Activity  Goal: Patient/Family Education  Outcome: Progressing Towards Goal     Problem: Pain  Goal: *Control of Pain  Outcome: Progressing Towards Goal  Goal: *PALLIATIVE CARE:  Alleviation of Pain  Outcome: Progressing Towards Goal     Problem: Patient Education: Go to Patient Education Activity  Goal: Patient/Family Education  Outcome: Progressing Towards Goal     Problem: Pressure Injury - Risk of  Goal: *Prevention of pressure injury  Description: Document Ko Scale and appropriate interventions in the flowsheet.   Outcome: Progressing Towards Goal  Note: Pressure Injury Interventions:       Moisture Interventions: Absorbent underpads, Offer toileting Q_hr, Minimize layers, Maintain skin hydration (lotion/cream), Limit adult briefs    Activity Interventions: Increase time out of bed, PT/OT evaluation    Mobility Interventions: Float heels, HOB 30 degrees or less, Pressure redistribution bed/mattress (bed type), PT/OT evaluation    Nutrition Interventions: Document food/fluid/supplement intake    Friction and Shear Interventions: HOB 30 degrees or less                Problem: Patient Education: Go to Patient Education Activity  Goal: Patient/Family Education  Outcome: Progressing Towards Goal     Problem: Patient Education: Go to Patient Education Activity  Goal: Patient/Family Education  Outcome: Progressing Towards Goal     Problem: Patient Education: Go to Patient Education Activity  Goal: Patient/Family Education  Outcome: Progressing Towards Goal

## 2022-08-31 NOTE — PROGRESS NOTES
Problem: Mobility Impaired (Adult and Pediatric)  Goal: *Acute Goals and Plan of Care (Insert Text)  Description: Physical Therapy Goals   Initiated 8/29/2022 and to be accomplished within 7 day(s)  1. Patient will move from supine <> sit with S in prep for out of bed activity and change of position. 2.  Patient will perform sit<> stand with S/RW in prep for transfers/ambulation. 3.  Patient will transfer from bed <> chair with S/RW for time up in chair for completion of ADL activity. 4.  Patient will ambulate 80 feet with S/RW for improved functional mobility at discharge. Outcome: Progressing Towards Goal   physical Therapy TREATMENT    Patient: Sukumar Norwood (78 y.o. female)  Date: 8/31/2022  Diagnosis: Atrial fibrillation ([address] [I48.91]  Dizziness [R42]  Ataxia [R27.0] Atrial fibrillation [facility])      Precautions: Fall   Chart, physical therapy assessment, plan of care and goals were reviewed. ASSESSMENT:  Pt found sitting EOB, with 2 family members present. Pt is able to perform 2 trials of standing marches. Progressed to rodríguez amb with CGA/min A. Placement till recommended, to decrease ataxia and scissoring. SPO2 range of 95-99% on RA during this session. Left sitting EOB with family. Progression toward goals:  []      Improving appropriately and progressing toward goals  [x]      Improving slowly and progressing toward goals  []      Not making progress toward goals and plan of care will be adjusted     PLAN:  Patient continues to benefit from skilled intervention to address the above impairments. Continue treatment per established plan of care. Discharge Recommendations:  Alfredo Dyer  Further Equipment Recommendations for Discharge:  bedside commode, gait belt, and rolling walker     SUBJECTIVE:   Patient stated I'm going to rehab for a little bit.     OBJECTIVE DATA SUMMARY:   Critical Behavior:  Neurologic State: Alert, Appropriate for age  Orientation Level: Oriented X4  Cognition: Follows commands, Appropriate for age attention/concentration  Safety/Judgement: Fall prevention  Functional Mobility Training:  Bed Mobility:  Scooting: Contact guard assistance  Transfers:  Sit to Stand: Minimum assistance  Stand to Sit: Contact guard assistance  Balance:  Sitting: Intact  Standing: Impaired; With support  Standing - Static: Fair  Standing - Dynamic : Poor  Ambulation/Gait Training:  Distance (ft): 50 Feet (ft)  Assistive Device: Gait belt;Walker, rolling  Ambulation - Level of Assistance: Minimal assistance  Gait Abnormalities: Ataxic;Decreased step clearance  Speed/Sheila: Slow  Step Length: Right shortened;Left shortened  Interventions: Safety awareness training; Tactile cues; Verbal cues; Visual/Demos  Therapeutic Exercises:   Standing march 2 x 2 min   Pain:  Pain Scale 1: Numeric (0 - 10)  Pain Intensity 1: 0  Pain out: 0   Activity Tolerance:   Fair  Please refer to the flowsheet for vital signs taken during this treatment.   After treatment:   [] Patient left in no apparent distress sitting up in chair  [x] Patient left in no apparent distress in bed  [x] Call bell left within reach  [x] Nursing notified  [] Caregiver present  [] Bed alarm activated      Didi Vizcarra PTA   Time Calculation: 24 mins

## 2022-09-01 LAB
COVID-19 RAPID TEST, COVR: NOT DETECTED
SOURCE, COVRS: NORMAL

## 2022-09-01 PROCEDURE — 65270000046 HC RM TELEMETRY

## 2022-09-01 PROCEDURE — 87635 SARS-COV-2 COVID-19 AMP PRB: CPT

## 2022-09-01 PROCEDURE — 74011000250 HC RX REV CODE- 250: Performed by: HOSPITALIST

## 2022-09-01 PROCEDURE — 74011250637 HC RX REV CODE- 250/637: Performed by: HOSPITALIST

## 2022-09-01 PROCEDURE — 77010033678 HC OXYGEN DAILY

## 2022-09-01 PROCEDURE — 97116 GAIT TRAINING THERAPY: CPT

## 2022-09-01 PROCEDURE — 74011250637 HC RX REV CODE- 250/637: Performed by: INTERNAL MEDICINE

## 2022-09-01 PROCEDURE — 74011000250 HC RX REV CODE- 250: Performed by: INTERNAL MEDICINE

## 2022-09-01 PROCEDURE — 74011250636 HC RX REV CODE- 250/636: Performed by: INTERNAL MEDICINE

## 2022-09-01 RX ORDER — GABAPENTIN 400 MG/1
400 CAPSULE ORAL
Qty: 10 CAPSULE | Refills: 0 | Status: SHIPPED | OUTPATIENT
Start: 2022-09-01

## 2022-09-01 RX ORDER — DIGOXIN 125 MCG
0.12 TABLET ORAL DAILY
Qty: 30 TABLET | Refills: 0 | Status: SHIPPED
Start: 2022-09-01 | End: 2022-09-02

## 2022-09-01 RX ORDER — DIGOXIN 125 MCG
0.12 TABLET ORAL DAILY
Status: DISCONTINUED | OUTPATIENT
Start: 2022-09-01 | End: 2022-09-01

## 2022-09-01 RX ORDER — AMIODARONE HYDROCHLORIDE 200 MG/1
400 TABLET ORAL EVERY 12 HOURS
Status: DISCONTINUED | OUTPATIENT
Start: 2022-09-01 | End: 2022-09-02 | Stop reason: HOSPADM

## 2022-09-01 RX ORDER — LIDOCAINE 4 G/100G
1 PATCH TOPICAL EVERY 24 HOURS
Status: DISCONTINUED | OUTPATIENT
Start: 2022-09-01 | End: 2022-09-02 | Stop reason: HOSPADM

## 2022-09-01 RX ADMIN — ACETAMINOPHEN 650 MG: 325 TABLET ORAL at 17:06

## 2022-09-01 RX ADMIN — Medication 500 MG: at 08:41

## 2022-09-01 RX ADMIN — SODIUM CHLORIDE, PRESERVATIVE FREE 10 ML: 5 INJECTION INTRAVENOUS at 06:14

## 2022-09-01 RX ADMIN — ENOXAPARIN SODIUM 50 MG: 100 INJECTION SUBCUTANEOUS at 20:19

## 2022-09-01 RX ADMIN — GABAPENTIN 400 MG: 400 CAPSULE ORAL at 20:19

## 2022-09-01 RX ADMIN — AMIODARONE HYDROCHLORIDE 400 MG: 200 TABLET ORAL at 22:15

## 2022-09-01 RX ADMIN — Medication 1 TABLET: at 08:41

## 2022-09-01 RX ADMIN — DIGOXIN 0.12 MG: 125 TABLET ORAL at 12:08

## 2022-09-01 RX ADMIN — PANTOPRAZOLE SODIUM 40 MG: 40 TABLET, DELAYED RELEASE ORAL at 06:43

## 2022-09-01 RX ADMIN — ENOXAPARIN SODIUM 50 MG: 100 INJECTION SUBCUTANEOUS at 08:42

## 2022-09-01 RX ADMIN — DIGOXIN 125 MCG: 250 INJECTION, SOLUTION INTRAMUSCULAR; INTRAVENOUS at 08:42

## 2022-09-01 RX ADMIN — POLYETHYLENE GLYCOL 3350 17 G: 17 POWDER, FOR SOLUTION ORAL at 08:42

## 2022-09-01 RX ADMIN — SODIUM CHLORIDE, PRESERVATIVE FREE 10 ML: 5 INJECTION INTRAVENOUS at 13:12

## 2022-09-01 NOTE — PROGRESS NOTES
Bedside and Verbal shift change report given to Yoko Muniz (oncoming nurse) by Myra Lerma (offgoing nurse).  Report included the following information SBAR, Kardex, MAR, Recent Results, and Cardiac Rhythm SR .

## 2022-09-01 NOTE — DISCHARGE SUMMARY
[address] [facility] SUMMARY    Name:  Ene Zamora  MR#:   [id number]  :  1945  ACCOUNT #:  [de-identified]  ADMIT DATE:  2022  DISCHARGE DATE:    Discharge date is 2022 or 2022 depending on authorization from insurance. The patient is going to [facility]. DISCHARGE DIAGNOSES:  1. Atrial fibrillation with rapid ventricular response, atrial flutter. 2.  Frequent urinary tract infections. 3.  Chronic cerebellar ataxia. 4.  History of back surgery. 5.  Neuropathy. 6.  Constipation. HOSPITAL SUMMARY:  This is a 69-year-old female brought into the hospital for worsening shortness of breath. She was found to be in rapid ventricular response, AFib. She was placed on IV Cardizem. She was admitted to Telemetry. She has a do not resuscitate code status. She had recently had fecal impaction treatment. She is vaccinated against COVID. The patient has a history of a pessary in place. She has a history of constipation, previous back surgery, cerebellar ataxia. She has been told she cannot take aspirin in the past, but she cannot clarify why. Rhe patient was managed for her AFib here. Various medication adjustments were necessary to help symptom management and rate control. She is now currently stable on digoxin 0.125 mg daily. She is not going to be on aspirin because of her intolerance of that in the past and her decision about that and no blood thinner is indicated due to the risk outweighing benefit due to her frequent falls with her ataxic gait. With that, Cardiology has been following along. They have recommended she is stable for discharge at this time. She is feeling well today. She has no complaints. No chest pain or shortness of breath. She is walking more than usual.  Her dyspnea on exertion has notably improved.   She has been recommended to go to a skilled nursing facility for rehabilitation which we were awaiting authorization for at this time. The patient today is awake and alert, in good spirits. Her H and H is 12.8 and 39.8 on last check. Creatinine 0.51. Her TSH was checked, it was negative. Her echocardiogram shows a normal ejection fraction of 55-60%. She had a CT angiogram that ruled out pulmonary embolism. She had a CT abdomen and pelvis that showed a large volume of stool suggestive of constipation. She has been getting stool softeners here. White count is normal.  There are no signs of infection otherwise. She had moderate leukocyte esterase and 4-10 wbc's on her urinalysis when she came in. The urine culture from 08/12 showed no growth. The patient today as noted in good spirits, has been at the bedside. Vital signs stable. Blood pressure 118/61, pulse 64, temperature 98, respiratory rate 16, SAO2 100% on 1 L. She is weaning off that to room air. Cardiac exam, irregular rhythm, regular rate. Lungs are clear. Abdomen soft, nontender. Lower extremities without edema. She is eating and denies any constipation issues at this time. Followup is with Dr. Rio Noel after discharge from rehab in 3 weeks. She should see her PCP, Khloe Ramos MD, who she should see also after discharge from skilled nursing rehab in approximately 2 weeks. DISCHARGE MEDICATIONS:  Her meds for discharge include the following. 1.  Digoxin 0.125 mg daily. 2.  Calcium carbonate 1 tablet twice daily. 3.  Vitamin D 1000 units daily. 4.  Pepcid 20 mg twice daily. 5.  Fish oil one cap daily. 6.  Paxil 20 mg daily. 7.  Vitamin C 500 mg daily. 8.  Neurontin 400 mg at night. 9.  MiraLax 17 g daily. She has a do not resuscitate code status. She is on a regular as tolerated diet. Continue PT and OT as tolerated. Forty-five minutes discharge time.       Roseline Richard MD      [id number]  D:  09/01/2022 11:05  T:  09/01/2022 11:40  JOB #:  [id number]  CC:  Khloe Ramos MD

## 2022-09-01 NOTE — PROGRESS NOTES
Report received from offgoing RN. Patient is pleasant, alert, oriented x4. Family members at bedside. Moves all extremities to command, denies loss of sensation. Respirations even and unlabored on 1L nc. Breath sounds clear to auscultation. Abdomen flat, soft, nontender. Bowel sounds activex4. 22g LFA is patent, capped, dressing intact. Sinus rhythm per telemetry. Skin intact. Denies pain, denies any further needs at this time.

## 2022-09-01 NOTE — PROGRESS NOTES
Problem: Mobility Impaired (Adult and Pediatric)  Goal: *Acute Goals and Plan of Care (Insert Text)  Description: Physical Therapy Goals   Initiated 8/29/2022 and to be accomplished within 7 day(s)  1. Patient will move from supine <> sit with S in prep for out of bed activity and change of position. 2.  Patient will perform sit<> stand with S/RW in prep for transfers/ambulation. 3.  Patient will transfer from bed <> chair with S/RW for time up in chair for completion of ADL activity. 4.  Patient will ambulate 80 feet with S/RW for improved functional mobility at discharge. Outcome: Progressing Towards Goal   []  Patient has met MD mika merrill for d/c home   []  Recommend HH with 24 hour adult care   [x]  Benefit from additional acute PT session to address:  rehab placement    PHYSICAL THERAPY TREATMENT    Patient: Stan Dacosta (68 y.o. female)  Date: 9/1/2022  Diagnosis: Atrial fibrillation ([address] [I48.91]  Dizziness [R42]  Ataxia [R27.0] Atrial fibrillation ([address]    Precautions: Fall  PLOF: ambulatory with a RW, reports falling about 1x/wk    ASSESSMENT:  Pt in bed upon arrival.  No assistance needed to sit up EOB. Kyphotic posture. Charlene/CGA for sit to stand with bed in lowest position. Ambulated with RW, ataxic gt pattern, circumduction on the RLE, LOB each time when turning requiring min/modA to maintain balance. Returned to room and back to supine. Progression toward goals:   []      Improving appropriately and progressing toward goals  [x]      Improving slowly and progressing toward goals  []      Not making progress toward goals and plan of care will be adjusted     PLAN:  Patient continues to benefit from skilled intervention to address the above impairments. Continue treatment per established plan of care.   Discharge Recommendations: Rehab  Further Equipment Recommendations for Discharge:  N/A    AMPA: Based on an AM-PAC score of **/24 (14/20 if omitting stairs) and their current functional mobility deficits, it is recommended that the patient have 3-5 sessions per week of Physical Therapy at d/c to increase the patient's independence. This [facility] score should be considered in conjunction with interdisciplinary team recommendations to determine the most appropriate discharge setting. Patient's social support, diagnosis, medical stability, and prior level of function should also be taken into consideration. SUBJECTIVE:   Patient stated My back always hurts, other then that, no pain.     OBJECTIVE DATA SUMMARY:   Critical Behavior:  Neurologic State: Alert  Orientation Level: Oriented X4  Cognition: Appropriate decision making  Safety/Judgement: Fall prevention  Functional Mobility Training:  Bed Mobility:    Supine to Sit: Additional time;Supervision  Sit to Supine: Supervision  Scooting: Minimum assistance  Transfers:  Sit to Stand: Contact guard assistance;Minimum assistance  Stand to Sit: Contact guard assistance  Balance:  Sitting: Intact  Standing: Impaired; With support  Standing - Static: Fair  Standing - Dynamic : Poor   Ambulation/Gait Training:  Distance (ft): 40 Feet (ft)  Assistive Device: Gait belt;Walker, rolling  Ambulation - Level of Assistance: Moderate assistance  Gait Abnormalities: Ataxic;Circumduction; Step to gait  Speed/Sheila: Slow  Step Length: Right shortened;Left shortened        Pain:  Pain level pre-treatment: 0/10  Pain level post-treatment: 0/10   Pain Intervention(s): Medication (see MAR); Rest, Ice, Repositioning   Response to intervention: Nurse notified, See doc flow    Activity Tolerance:   Fair/fair-  Please refer to the flowsheet for vital signs taken during this treatment.   After treatment:   [] Patient left in no apparent distress sitting up in chair  [x] Patient left in no apparent distress in bed  [x] Call bell left within reach  [] Nursing notified  [x] Caregiver present  [] Bed alarm activated  [] SCDs applied      COMMUNICATION/EDUCATION:   [x]         Role of Physical Therapy in the acute care setting. [x]         Fall prevention education was provided and the patient/caregiver indicated understanding. [x]         Patient/family have participated as able in working toward goals and plan of care. [x]         Patient/family agree to work toward stated goals and plan of care. []         Patient understands intent and goals of therapy, but is neutral about his/her participation. []         Patient is unable to participate in stated goals/plan of care: ongoing with therapy staff.  []         Other:        Salina Elaine PTA   Time Calculation: 24 mins    [address] [facility] [address] [address] AM-PAC® Basic Mobility Inpatient Short Form (6-Clicks) Version 2    How much HELP from another person does the patient currently need    (If the patient hasn't done an activity recently, how much help from another person do you think he/she would need if he/she tried?)   Total (Total A or Dep)   A Lot  (Mod to Max A)   A Little (Sup or Min A)   None (Mod I to I)   Turning from your back to your side while in a flat bed without using bedrails? [] 1 [] 2 [x] 3 [] 4   2. Moving from lying on your back to sitting on the side of a flat bed without using bedrails? [] 1 [] 2 [x] 3 [] 4   3. Moving to and from a bed to a chair (including a wheelchair)? [] 1 [] 2 [x] 3 [] 4   4. Standing up from a chair using your arms (e.g., wheelchair, or bedside chair)? [] 1 [] 2 [x] 3 [] 4   5. Walking in hospital room? [] 1 [x] 2 [] 3 [] 4   6. Climbing 3-5 steps with a railing?+   [] 1 [] 2 [] 3 [] 4   +If stair climbing cannot be assessed, skip item #6. Sum responses from items 1-5. Based on an AM-PAC score of **/24 (or **/20 if omitting stairs) and their current functional mobility deficits, it is recommended that the patient have 5-7 sessions per week of Physical Therapy at d/c to increase the patient's independence.   Currently, this patient demonstrates the potential endurance, and/or tolerance for 3 hours of therapy each day at d/c. Based on an AM-PAC score of **/24 (14/20 if omitting stairs) and their current functional mobility deficits, it is recommended that the patient have 3-5 sessions per week of Physical Therapy at d/c to increase the patient's independence. Based on an AM-PAC score of **/24 (or **/20 if omitting stairs) and their current functional mobility deficits, it is recommended that the patient have 2-3 sessions per week of Physical Therapy at d/c to increase the patient's independence. At this time and based on an AM-PAC score of **/24 (or **/20 if omitting stairs), no further PT is recommended upon discharge due to (i.e. patient at baseline functional statusetc). Recommend patient returns to prior setting with prior services.

## 2022-09-01 NOTE — PROGRESS NOTES
Transition of care to SNF: [facility] pending medically ready, insurance auth and bed availability     Please include hard prescriptions for any controlled substances the patient will need for SNF. Please note patient will need a hard prescription for gabapentin if she is to continue this home med at [facility]. Communication to Patient/Family:  Met with patient and family and they are agreeable to the transition plan. The Plan for Transition of Care is related to the following treatment goals: rehab    The Patient and/or patient representative  was provided with a choice of provider and agrees   with the discharge plan. Yes [x] No []    Freedom of choice list was provided with basic dialogue that supports the patient's individualized plan of care/goals and shares the quality data associated with the providers. Yes [x] No []    SNF/Rehab Transition:  Patient has been accepted to [address] at [facility] for SNF/Rehab and meets criteria for admission. Patient will transported by [facility] and expected to leave at      . Communication to SNF/Rehab:  Bedside RN,  has been notified to update the transition plan to the facility and call report [phone]. Discharge information has been updated on the AVS and communicated via [place] and/or CC link. Discharge instructions to be fax'd to facility at [phone] per request.     Please include all hard scripts for controlled substances, med rec and dc summary in packet. Please medicate for pain prior to dc if possible and needed to help offset delay when patient first arrives to facility. Reviewed and confirmed with facility, [facility] can manage the patient care needs for the following:     Xiao Weaver with (X) only those applicable:  Medication:  []Medications are available at the facility  []IV Antibiotics    []Controlled Substance - hard copies available sent.   []Weekly Labs    Equipment:  []CPAP/BiPAP  []Wound Vacuum  []Galvez or Urinary Device  []PICC/Central Line  []Nebulizer  []Ventilator    Treatment:  []Isolation (for MRSA, VRE, etc.)  []Surgical Drain Management  []Tracheostomy Care  []Dressing Changes  []Dialysis with transportation  []PEG Care  []Oxygen  []Daily Weights for Heart Failure    Dietary:  []Any diet limitations  []Tube Feedings   []Total Parenteral Management (TPN)    Financial Resources:  []Medicaid Application Completed    []UAI Completed  and copy given to pt/family    []A screening has previously been completed. []Level II Completed    [] Private pay individual who will not become   financially eligible for Medicaid within 6 months from admission to a [address] [place] [address] facility. [] Individual refused to have screening conducted. []Medicaid Application Completed    []The screening denied because it was determined individual did not need/did not qualify for nursing facility level of care. [] Out of state residents seeking direct admission to a [address] facility. [] Individuals who are inpatients of an out of state hospital, or in state or out of state veterans/ hospital and seek direct admission to a [address] facility  [] Individuals who are pateints or residents of a state owned/operated facility that is licensed by Department of [employer] ([employer]) and seek direct admission to [address] [place] [address]  [] A screening not required for enrollment in [address] [address] [address] services as set out in [address] [place] [address] 91-  [] [facility]) staff shall perform screenings of the [facility] clients. Advanced Care Plan:  []Surrogate Decision Maker of Care  []POA  []Communicated Code Status and copy sent.     Other:

## 2022-09-01 NOTE — PROGRESS NOTES
Bedside and Verbal shift change report given to MIRTHA Gupta (oncoming nurse) by Jose Sparks (offgoing nurse).  Report included the following information SBAR, Kardex, Intake/Output, MAR, Recent Results, and Cardiac Rhythm SR .

## 2022-09-01 NOTE — PROGRESS NOTES
Comprehensive Nutrition Assessment    Type and Reason for Visit: Initial, RD nutrition re-screen/LOS    Nutrition Recommendations/Plan:   Continue current diet. Malnutrition Assessment:  Malnutrition Status: At risk for malnutrition (specify) (09/01/22 8130)        Nutrition Assessment:    77yoF. Admitted 8/25/22 with dizziness SOB, and A fib. . H/o GERD, MI. Per chart-pt meal intake is % and has no significant weight loss. Per patient-appetite remains as was prior to admission and reports eating well during her admission. Nutrition Related Findings:    BM 8/30/22. Protonix, pericolace, miralax, vit C. Wound Type: None    Current Nutrition Intake & Therapies:  Average Meal Intake: %  Average Supplement Intake: None ordered  ADULT DIET Regular; Low Fat/Low Chol/High Fiber/ARMANI    Anthropometric Measures:  Height: 5' (152.4 cm)  Ideal Body Weight (IBW): 100 lbs (45 kg)  Admission Body Weight: 112 lb (8/26/22 bed scale)  Current Body Wt:  49.8 kg (109 lb 12.6 oz), 109.8 % IBW.  Bed scale  Current BMI (kg/m2): 21.4  Usual Body Weight: 50.8 kg (112 lb)  % Weight Change (Calculated): -2  Weight Adjustment: No adjustment                 BMI Category: Underweight (BMI less than 22) age over 72    Estimated Daily Nutrient Needs:  Energy Requirements Based On: Kcal/kg  Weight Used for Energy Requirements: Current  Energy (kcal/day): 1745-2053  Weight Used for Protein Requirements: Current  Protein (g/day): 50-60  Method Used for Fluid Requirements: 1 ml/kcal  Fluid (ml/day): 7113-3781    Nutrition Diagnosis:   Inadequate energy intake related to acute injury/trauma as evidenced by BMI    Nutrition Interventions:   Food and/or Nutrient Delivery: Continue current diet  Nutrition Education/Counseling: No recommendations at this time  Coordination of Nutrition Care: Continue to monitor while inpatient       Goals:     Goals: Meet at least 75% of estimated needs, by next RD assessment       Nutrition Monitoring and Evaluation:   Behavioral-Environmental Outcomes: None identified  Food/Nutrient Intake Outcomes: Food and nutrient intake  Physical Signs/Symptoms Outcomes: Biochemical data, GI status, Meal time behavior, Weight    Discharge Planning:    Continue current diet    [employer]

## 2022-09-01 NOTE — PROGRESS NOTES
Problem: Falls - Risk of  Goal: *Absence of Falls  Description: Document Ezequiel Sauceda Fall Risk and appropriate interventions in the flowsheet.   Outcome: Progressing Towards Goal  Note: Fall Risk Interventions:  Mobility Interventions: Bed/chair exit alarm, Assess mobility with egress test, OT consult for ADLs, PT Consult for mobility concerns, PT Consult for assist device competence, Patient to call before getting OOB         Medication Interventions: Evaluate medications/consider consulting pharmacy    Elimination Interventions: Call light in reach, Patient to call for help with toileting needs    History of Falls Interventions: Consult care management for discharge planning, Door open when patient unattended, Investigate reason for fall, Room close to nurse's station         Problem: Patient Education: Go to Patient Education Activity  Goal: Patient/Family Education  Outcome: Progressing Towards Goal     Problem: General Medical Care Plan  Goal: *Vital signs within specified parameters  Outcome: Progressing Towards Goal  Goal: *Labs within defined limits  Outcome: Progressing Towards Goal  Goal: *Absence of infection signs and symptoms  Outcome: Progressing Towards Goal  Goal: *Optimal pain control at patient's stated goal  Outcome: Progressing Towards Goal  Goal: *Skin integrity maintained  Outcome: Progressing Towards Goal  Goal: *Fluid volume balance  Outcome: Progressing Towards Goal  Goal: *Optimize nutritional status  Outcome: Progressing Towards Goal  Goal: *Anxiety reduced or absent  Outcome: Progressing Towards Goal  Goal: *Progressive mobility and function (eg: ADL's)  Outcome: Progressing Towards Goal     Problem: Patient Education: Go to Patient Education Activity  Goal: Patient/Family Education  Outcome: Progressing Towards Goal     Problem: Pain  Goal: *Control of Pain  Outcome: Progressing Towards Goal  Goal: *PALLIATIVE CARE:  Alleviation of Pain  Outcome: Progressing Towards Goal     Problem: Patient Education: Go to Patient Education Activity  Goal: Patient/Family Education  Outcome: Progressing Towards Goal     Problem: Pressure Injury - Risk of  Goal: *Prevention of pressure injury  Description: Document Ko Scale and appropriate interventions in the flowsheet.   Outcome: Progressing Towards Goal  Note: Pressure Injury Interventions:       Moisture Interventions: Absorbent underpads, Offer toileting Q_hr, Minimize layers, Maintain skin hydration (lotion/cream), Limit adult briefs    Activity Interventions: Pressure redistribution bed/mattress(bed type), PT/OT evaluation    Mobility Interventions: HOB 30 degrees or less, PT/OT evaluation, Pressure redistribution bed/mattress (bed type)    Nutrition Interventions: Document food/fluid/supplement intake, Offer support with meals,snacks and hydration    Friction and Shear Interventions: HOB 30 degrees or less, Minimize layers                Problem: Patient Education: Go to Patient Education Activity  Goal: Patient/Family Education  Outcome: Progressing Towards Goal     Problem: Patient Education: Go to Patient Education Activity  Goal: Patient/Family Education  Outcome: Progressing Towards Goal     Problem: Patient Education: Go to Patient Education Activity  Goal: Patient/Family Education  Outcome: Progressing Towards Goal

## 2022-09-01 NOTE — PROGRESS NOTES
Cardiology Progress Note        Patient: Adron Lesch        Sex: female          DOA: 8/25/2022  YOB: 1945      Age:  65 y.o.        LOS:  LOS: 5 days . 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    Acute blood loss anemia D62    DDD (degenerative disc disease), lumbar M51.36    Spondylolisthesis of lumbar region M43.16    Scoliosis of thoracolumbar spine M41.9    SIRS (systemic inflammatory response syndrome) ([address] R65.10    History of back surgery Z98.890    Atelectasis J98.11    New onset a-fib ([address] I48.91    Atrial fibrillation ([address] I48.91    Dizziness R42    Ataxia R27.0    Constipated K59.00       Paroxysmal atrial fibrillation CHADsVASc score is 3.0    Echocardiogram revealed       Left Ventricle: Normal left ventricular systolic function with a visually estimated EF of 55 - 60%. Left ventricle size is normal. Normal wall thickness. Normal wall motion. Normal diastolic function. Aortic Valve: Not well visualized. Mildly calcified cusp. Mild to moderate regurgitation. No stenosis. Pulmonary Arteries: Pulmonary artery was not well visualized. The estimated PASP is 25 mmHg. Telemetry monitor revealed  currently she is in sinus rhythm. She had episode of atrial fibrillation this morning. No episode of paroxysmal atrial fibrillation in last 24 hours    Plan:    Continue digoxin  Continue Full dose of Lovenox for now. Fall precaution  If patient has frequent episodes of atrial fibrillation will consider antiarrhythmics. Plan discussed with patient and family member and they verbally understood and agreed. Subjective:    cc:    Denies any palpitation or shortness of breath    REVIEW OF SYSTEMS:     General: No fevers or chills.   Cardiovascular: No chest pain,Positive palpitations, No orthopnea, No PND, No leg swelling, No claudication  Pulmonary: No  dyspnea. Gastrointestinal: No nausea, vomiting, bleeding  Neurology: No Dizziness    Objective:      Visit Vitals  BP (!) 121/54   Pulse 72   Temp 97.7 °F (36.5 °C)   Resp 16   Ht 5' (1.524 m)   Wt 49.8 kg (109 lb 12.8 oz)   SpO2 100%   BMI 21.44 kg/m²     Body mass index is 21.44 kg/m². Physical Exam:  General Appearance: Comfortable, not using accessory muscles of respiration. HEENT: FREDDY. HEAD: Atraumatic  NECK: No JVD, no thyroidomeglay. CAROTIDS: no bruit  LUNGS: Clear bilaterally. HEART: S1+S2 audible,  irregularly irregular, no murmur, no pericardial rub. ABD: Non-tender, BS Audible    EXT: No edema, and no cyanosis. VASCULAR EXAM: Pulses are intact. PSYCHIATRIC EXAM: Mood is appropriate. MUSCULOSKELETAL: Grossly no joint deformity.   NEUROLOGICAL: AAO times 3, No motor and sensory deficit    Medication:  Current Facility-Administered Medications   Medication Dose Route Frequency    pantoprazole (PROTONIX) tablet 40 mg  40 mg Oral ACB    enoxaparin (LOVENOX) injection 50 mg  1 mg/kg SubCUTAneous Q12H    digoxin (LANOXIN) injection 125 mcg  125 mcg IntraVENous DAILY    sodium chloride (NS) flush 5-40 mL  5-40 mL IntraVENous Q8H    sodium chloride (NS) flush 5-40 mL  5-40 mL IntraVENous PRN    acetaminophen (TYLENOL) tablet 650 mg  650 mg Oral Q6H PRN    Or    acetaminophen (TYLENOL) suppository 650 mg  650 mg Rectal Q6H PRN    polyethylene glycol (MIRALAX) packet 17 g  17 g Oral DAILY PRN    ondansetron (ZOFRAN ODT) tablet 4 mg  4 mg Oral Q8H PRN    Or    ondansetron (ZOFRAN) injection 4 mg  4 mg IntraVENous Q6H PRN    gabapentin (NEURONTIN) capsule 400 mg  400 mg Oral QHS    ascorbic acid (vitamin C) (VITAMIN C) tablet 500 mg  500 mg Oral DAILY    polyethylene glycol (MIRALAX) packet 17 g  17 g Oral BID    senna-docusate (PERICOLACE) 8.6-50 mg per tablet 1 Tablet  1 Tablet Oral DAILY bisacodyL (DULCOLAX) suppository 10 mg  10 mg Rectal DAILY PRN               Lab/Data Reviewed:       Recent Labs     08/30/22 0225 08/29/22 0145   WBC 7.1 5.6   HGB 12.8 10.8*   HCT 39.8 34.0*    208       Recent Labs     08/30/22 0225 08/29/22 0145    140   K 3.8 4.0    108   CO2 25 29   GLU 94 95   BUN 19* 23*   CREA 0.51* 0.52*   CA 9.6 9.5         Signed By: Todd Flores MD     August 31, 2022

## 2022-09-01 NOTE — PROGRESS NOTES
5932 Bedside shift change report given to Tracey Lane RN (oncoming nurse) by Juan F Dong RN (offgoing nurse). Report included the following information SBAR, Kardex, Intake/Output, MAR, and Recent Results. 2182 Bedside shift change report given to Marilyn Restrepo (oncoming nurse) by Lady Piedra (offgoing nurse). Report included the following information SBAR, Kardex, Intake/Output, MAR, and Recent Results.

## 2022-09-02 VITALS
DIASTOLIC BLOOD PRESSURE: 59 MMHG | SYSTOLIC BLOOD PRESSURE: 114 MMHG | OXYGEN SATURATION: 98 % | WEIGHT: 109.8 LBS | HEART RATE: 70 BPM | HEIGHT: 60 IN | RESPIRATION RATE: 16 BRPM | BODY MASS INDEX: 21.55 KG/M2 | TEMPERATURE: 98.4 F

## 2022-09-02 LAB
ATRIAL RATE: 63 BPM
CALCULATED P AXIS, ECG09: 54 DEGREES
CALCULATED R AXIS, ECG10: 13 DEGREES
CALCULATED R AXIS, ECG10: 9 DEGREES
CALCULATED T AXIS, ECG11: 30 DEGREES
CALCULATED T AXIS, ECG11: 39 DEGREES
DIAGNOSIS, 93000: NORMAL
DIAGNOSIS, 93000: NORMAL
P-R INTERVAL, ECG05: 174 MS
Q-T INTERVAL, ECG07: 306 MS
Q-T INTERVAL, ECG07: 404 MS
QRS DURATION, ECG06: 80 MS
QRS DURATION, ECG06: 80 MS
QTC CALCULATION (BEZET), ECG08: 413 MS
QTC CALCULATION (BEZET), ECG08: 436 MS
VENTRICULAR RATE, ECG03: 122 BPM
VENTRICULAR RATE, ECG03: 63 BPM

## 2022-09-02 PROCEDURE — 97116 GAIT TRAINING THERAPY: CPT

## 2022-09-02 PROCEDURE — 74011250637 HC RX REV CODE- 250/637: Performed by: HOSPITALIST

## 2022-09-02 PROCEDURE — 74011250637 HC RX REV CODE- 250/637: Performed by: INTERNAL MEDICINE

## 2022-09-02 PROCEDURE — 74011250636 HC RX REV CODE- 250/636: Performed by: INTERNAL MEDICINE

## 2022-09-02 RX ORDER — AMIODARONE HYDROCHLORIDE 200 MG/1
200 TABLET ORAL DAILY
Qty: 30 TABLET | Refills: 0 | Status: SHIPPED
Start: 2022-09-07

## 2022-09-02 RX ORDER — AMIODARONE HYDROCHLORIDE 400 MG/1
400 TABLET ORAL EVERY 12 HOURS
Qty: 8 TABLET | Refills: 0 | Status: SHIPPED
Start: 2022-09-02 | End: 2022-09-06

## 2022-09-02 RX ADMIN — POLYETHYLENE GLYCOL 3350 17 G: 17 POWDER, FOR SOLUTION ORAL at 10:08

## 2022-09-02 RX ADMIN — ENOXAPARIN SODIUM 50 MG: 100 INJECTION SUBCUTANEOUS at 10:09

## 2022-09-02 RX ADMIN — Medication 1 TABLET: at 10:08

## 2022-09-02 RX ADMIN — AMIODARONE HYDROCHLORIDE 400 MG: 200 TABLET ORAL at 10:09

## 2022-09-02 RX ADMIN — PANTOPRAZOLE SODIUM 40 MG: 40 TABLET, DELAYED RELEASE ORAL at 06:34

## 2022-09-02 RX ADMIN — Medication 500 MG: at 10:08

## 2022-09-02 NOTE — PROGRESS NOTES
D/C Plan: [facility]    Pt has been accepted to [facility] for admission today. Insurance [address] has been obtained. Pt has a 3:00pm transport arranged. CM spoke with pt and facility and all are aware and in agreement with plan transfer today. No other care transition needs have been identified. Transition of care to SNF: [facility]     Communication to Patient/Family:  Met with patient and family and they are agreeable to the transition plan. The Plan for Transition of Care is related to the following treatment goals: SNF    The Patient and/or patient representative was provided with a choice of provider and agrees   with the discharge plan. Yes [x] No []    Freedom of choice list was provided with basic dialogue that supports the patient's individualized plan of care/goals and shares the quality data associated with the providers. Yes [x] No []    SNF/Rehab Transition:  Patient has been accepted to [facility] at [address] [facility] for SNF/Rehab and meets criteria for admission. Patient will transported by medical transport  and expected to leave at 3:00pm.    Communication to SNF/Rehab:  Bedside RN has been notified to update the transition plan to the facility and call report [phone]. Discharge information has been updated on the S and communicated via [facility] and/or CC link. Discharge instructions to be fax'd to facility at [phone] per request.     Please include all hard scripts for controlled substances, med rec and dc summary in packet. Please medicate for pain prior to dc if possible and needed to help offset delay when patient first arrives to facility. Reviewed and confirmed with facility, Christian ALLEN can manage the patient care needs for the following:     [facility] with (X) only those applicable:  Medication:  []Medications are available at the facility  []IV Antibiotics    []Controlled Substance - hard copies available sent.   []Weekly Labs Equipment:  []CPAP/BiPAP  []Wound Vacuum  []Galvez or Urinary Device  []PICC/Central Line  []Nebulizer  []Ventilator    Treatment:  []Isolation (for MRSA, VRE, etc.)  []Surgical Drain Management  []Tracheostomy Care  []Dressing Changes  []Dialysis with transportation  []PEG Care  []Oxygen  []Daily Weights for Heart Failure    Dietary:  []Any diet limitations  []Tube Feedings   []Total Parenteral Management (TPN)    Financial Resources:  []Medicaid Application Completed    []UAI Completed  and copy given to pt/family    []A screening has previously been completed. []Level II Completed    [] Private pay individual who will not become   financially eligible for Medicaid within 6 months from admission to a [address] [place] [address] facility. [] Individual refused to have screening conducted. []Medicaid Application Completed    []The screening denied because it was determined individual did not need/did not qualify for nursing facility level of care. [] Out of state residents seeking direct admission to a [address] facility. [] Individuals who are inpatients of an out of state hospital, or in state or out of state veterans/ hospital and seek direct admission to a [address] facility  [] Individuals who are pateints or residents of a state owned/operated facility that is licensed by [employer] and seek direct admission to [address] [place] [address]  [] A screening not required for enrollment in [address] [address] [address] services as set out in [address] [place] [address] 40-  [] [facility]) staff shall perform screenings of the [facility] clients. Advanced Care Plan:  []Surrogate Decision Maker of Care  []POA  []Communicated Code Status and copy sent. Other:         Care Management Interventions  PCP Verified by CM:  Yes  Last Visit to PCP: 08/01/22  Transition of Care Consult (CM Consult): Discharge Planning, SNF  Physical Therapy Consult: Yes  Occupational Therapy Consult: Yes  Support Systems: Spouse/Significant Other  Confirm Follow Up Transport: Family  The Plan for Transition of Care is Related to the Following Treatment Goals : [facility]  Discharge Location  Patient Expects to be Discharged to[de-identified] Skilled nursing facility

## 2022-09-02 NOTE — PROGRESS NOTES
Bedside and Verbal shift change report given to MIRTHA Avendaño (oncoming nurse) by Paulo Mc (offgoing nurse). Report included the following information SBAR, Kardex, Intake/Output, MAR, Recent Results, and Cardiac Rhythm Afib controlled rate .

## 2022-09-02 NOTE — PROGRESS NOTES
Problem: Mobility Impaired (Adult and Pediatric)  Goal: *Acute Goals and Plan of Care (Insert Text)  Description: Physical Therapy Goals   Initiated 8/29/2022 and to be accomplished within 7 day(s)  1. Patient will move from supine <> sit with S in prep for out of bed activity and change of position. 2.  Patient will perform sit<> stand with S/RW in prep for transfers/ambulation. 3.  Patient will transfer from bed <> chair with S/RW for time up in chair for completion of ADL activity. 4.  Patient will ambulate 80 feet with S/RW for improved functional mobility at discharge. Outcome: Progressing Towards Goal   []  Patient has met MD mika merrill for d/c home   []  Recommend HH with 24 hour adult care   [x]  Benefit from additional acute PT session to address:  rehab placement    PHYSICAL THERAPY TREATMENT    Patient: Gerson Gleason (42 y.o. female)  Date: 9/2/2022  Diagnosis: Atrial fibrillation ([address] [I48.91]  Dizziness [R42]  Ataxia [R27.0] Atrial fibrillation ([address]    Precautions: Fall  PLOF:     ASSESSMENT:  Pt in bed upon arrival.  No assistance needed for bed mobility, CGA for sit to stand. Ambulated with RW, ataxic gt pattern, pushes RW too far ahead and when R toes hit the leg of the walker the pt performs an extra task of hip extension to hip flexion and  high knee flexion to progress RLE through swing phase of gait, therefore significantly decreasing balance. LOB 5x, one time required maxA to regain balance. Pt unable to maintain proper RW mgmt during gait, even with manual correction. Pt returned to room and back to bed. Progression toward goals:   []      Improving appropriately and progressing toward goals  [x]      Improving slowly and progressing toward goals  []      Not making progress toward goals and plan of care will be adjusted     PLAN:  Patient continues to benefit from skilled intervention to address the above impairments.   Continue treatment per established plan of care. Discharge Recommendations: Rehab  Further Equipment Recommendations for Discharge:  N/A    AMPAC: Based on an AM-PAC score of **/24 (14/20 if omitting stairs) and their current functional mobility deficits, it is recommended that the patient have 3-5 sessions per week of Physical Therapy at d/c to increase the patient's independence. This AMPAC score should be considered in conjunction with interdisciplinary team recommendations to determine the most appropriate discharge setting. Patient's social support, diagnosis, medical stability, and prior level of function should also be taken into consideration. SUBJECTIVE:   Patient stated ok.     OBJECTIVE DATA SUMMARY:   Critical Behavior:  Neurologic State: Alert  Orientation Level: Oriented X4  Cognition: Appropriate decision making  Safety/Judgement: Fall prevention  Functional Mobility Training:  Bed Mobility:    Supine to Sit: Supervision  Sit to Supine: Supervision  Transfers:  Sit to Stand: Contact guard assistance  Stand to Sit: Contact guard assistance  Balance:  Sitting: Intact  Standing: Impaired; With support  Standing - Static: Fair  Standing - Dynamic : Poor   Ambulation/Gait Training:  Distance (ft): 50 Feet (ft)  Assistive Device: Gait belt;Walker, rolling  Ambulation - Level of Assistance: Moderate assistance  Gait Abnormalities: Ataxic  Speed/Sheila: Slow  Step Length: Right shortened;Left shortened        Pain:  Pain level pre-treatment: 0/10  Pain level post-treatment: 0/10   Pain Intervention(s): Medication (see MAR); Rest, Ice, Repositioning   Response to intervention: Nurse notified, See doc flow    Activity Tolerance:   fair  Please refer to the flowsheet for vital signs taken during this treatment.   After treatment:   [] Patient left in no apparent distress sitting up in chair  [x] Patient left in no apparent distress in bed  [x] Call bell left within reach  [] Nursing notified  [x] Caregiver present  [] Bed alarm activated  [] SCDs applied      COMMUNICATION/EDUCATION:   [x]         Role of Physical Therapy in the acute care setting. [x]         Fall prevention education was provided and the patient/caregiver indicated understanding. [x]         Patient/family have participated as able in working toward goals and plan of care. [x]         Patient/family agree to work toward stated goals and plan of care. []         Patient understands intent and goals of therapy, but is neutral about his/her participation. []         Patient is unable to participate in stated goals/plan of care: ongoing with therapy staff.  []         Other:        Melissa Allen PTA   Time Calculation: 23 mins    [facility] AM-PAC® Basic Mobility Inpatient Short Form (6-Clicks) Version 2    How much HELP from another person does the patient currently need    (If the patient hasn't done an activity recently, how much help from another person do you think he/she would need if he/she tried?)   Total (Total A or Dep)   A Lot  (Mod to Max A)   A Little (Sup or Min A)   None (Mod I to I)   Turning from your back to your side while in a flat bed without using bedrails? [] 1 [] 2 [] 3 [x] 4   2. Moving from lying on your back to sitting on the side of a flat bed without using bedrails? [] 1 [] 2 [x] 3 [] 4   3. Moving to and from a bed to a chair (including a wheelchair)? [] 1 [x] 2 [] 3 [] 4   4. Standing up from a chair using your arms (e.g., wheelchair, or bedside chair)? [] 1 [] 2 [x] 3 [] 4   5. Walking in hospital room? [] 1 [x] 2 [] 3 [] 4   6. Climbing 3-5 steps with a railing?+   [] 1 [] 2 [] 3 [] 4   +If stair climbing cannot be assessed, skip item #6. Sum responses from items 1-5. Based on an AM-PAC score of **/24 (or **/20 if omitting stairs) and their current functional mobility deficits, it is recommended that the patient have 5-7 sessions per week of Physical Therapy at d/c to increase the patient's independence. Currently, this patient demonstrates the potential endurance, and/or tolerance for 3 hours of therapy each day at d/c. Based on an AM-PAC score of **/24 (14/20 if omitting stairs) and their current functional mobility deficits, it is recommended that the patient have 3-5 sessions per week of Physical Therapy at d/c to increase the patient's independence. Based on an AM-PAC score of **/24 (or **/20 if omitting stairs) and their current functional mobility deficits, it is recommended that the patient have 2-3 sessions per week of Physical Therapy at d/c to increase the patient's independence. At this time and based on an AM-PAC score of **/24 (or **/20 if omitting stairs), no further PT is recommended upon discharge due to (i.e. patient at baseline functional statusetc). Recommend patient returns to prior setting with prior services.

## 2022-09-02 NOTE — PROGRESS NOTES
Cardiology Progress Note        Patient: Renita Meckel        Sex: female          DOA: 8/25/2022  YOB: 1945      Age:  65 y.o.        LOS:  LOS: 7 days . 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    Acute blood loss anemia D62    DDD (degenerative disc disease), lumbar M51.36    Spondylolisthesis of lumbar region M43.16    Scoliosis of thoracolumbar spine M41.9    SIRS (systemic inflammatory response syndrome) ([address] R65.10    History of back surgery Z98.890    Atelectasis J98.11    New onset a-fib ([address] I48.91    Atrial fibrillation ([address] I48.91    Dizziness R42    Ataxia R27.0    Constipated K59.00       Paroxysmal atrial fibrillation CHADsVASc score is 3.0    Echocardiogram revealed       Left Ventricle: Normal left ventricular systolic function with a visually estimated EF of 55 - 60%. Left ventricle size is normal. Normal wall thickness. Normal wall motion. Normal diastolic function. Aortic Valve: Not well visualized. Mildly calcified cusp. Mild to moderate regurgitation. No stenosis. Pulmonary Arteries: Pulmonary artery was not well visualized. The estimated PASP is 25 mmHg. Telemetry monitor revealed currently she is in sinus rhythm. Plan:    Continue oral amiodarone. 400 mg by mouth twice a day for 5 days followed by 200 mg by mouth once a day. Due to risk of fall and bleeding will not start oral anticoagulation therapy. Patient was told in the past he should not be on aspirin. Continue Full dose of Lovenox for now. Fall precaution  Plan discussed with patient and family member and they verbally understood and agreed. Cardiologist sign off.     Follow-up in cardiology clinic in 6-8 weeks after discharge              Subjective:    cc:   Denies any palpitation    REVIEW OF SYSTEMS:     General: No fevers or chills. Cardiovascular: No chest pain,Positive palpitations, No orthopnea, No PND, No leg swelling, No claudication  Pulmonary: No  dyspnea. Gastrointestinal: No nausea, vomiting, bleeding  Neurology: No Dizziness    Objective:      Visit Vitals  BP (!) 114/59   Pulse 70   Temp 98.4 °F (36.9 °C)   Resp 16   Ht 5' (1.524 m)   Wt 49.8 kg (109 lb 12.8 oz)   SpO2 98%   BMI 21.44 kg/m²     Body mass index is 21.44 kg/m². Physical Exam:  General Appearance: Comfortable, not using accessory muscles of respiration. HEENT: FREDDY. HEAD: Atraumatic  NECK: No JVD, no thyroidomeglay. CAROTIDS: no bruit  LUNGS: Clear bilaterally. HEART: S1+S2 audible, no murmur, no pericardial rub. ABD: Non-tender, BS Audible    EXT: No edema, and no cyanosis. VASCULAR EXAM: Pulses are intact. PSYCHIATRIC EXAM: Mood is appropriate. MUSCULOSKELETAL: Grossly no joint deformity.   NEUROLOGICAL: AAO times 3, No motor and sensory deficit    Medication:  Current Facility-Administered Medications   Medication Dose Route Frequency    lidocaine 4 % patch 1 Patch  1 Patch TransDERmal Q24H    amiodarone (CORDARONE) tablet 400 mg  400 mg Oral Q12H    pantoprazole (PROTONIX) tablet 40 mg  40 mg Oral ACB    enoxaparin (LOVENOX) injection 50 mg  1 mg/kg SubCUTAneous Q12H    sodium chloride (NS) flush 5-40 mL  5-40 mL IntraVENous Q8H    sodium chloride (NS) flush 5-40 mL  5-40 mL IntraVENous PRN    acetaminophen (TYLENOL) tablet 650 mg  650 mg Oral Q6H PRN    Or    acetaminophen (TYLENOL) suppository 650 mg  650 mg Rectal Q6H PRN    polyethylene glycol (MIRALAX) packet 17 g  17 g Oral DAILY PRN    ondansetron (ZOFRAN ODT) tablet 4 mg  4 mg Oral Q8H PRN    Or    ondansetron (ZOFRAN) injection 4 mg  4 mg IntraVENous Q6H PRN    gabapentin (NEURONTIN) capsule 400 mg  400 mg Oral QHS    ascorbic acid (vitamin C) (VITAMIN C) tablet 500 mg  500 mg Oral DAILY    polyethylene glycol (MIRALAX) packet 17 g  17 g Oral BID    senna-docusate (PERICOLACE) 8.6-50 mg per tablet 1 Tablet  1 Tablet Oral DAILY    bisacodyL (DULCOLAX) suppository 10 mg  10 mg Rectal DAILY PRN               Lab/Data Reviewed:       No results for input(s): WBC, HGB, HCT, PLT, HGBEXT, HCTEXT, PLTEXT, HGBEXT, HCTEXT, PLTEXT in the last 72 hours. No results for input(s): NA, K, CL, CO2, GLU, BUN, CREA, CA in the last 72 hours.       Signed By: Mino Elena MD     September 2, 2022

## 2022-09-02 NOTE — PROGRESS NOTES
Cardiology Progress Note        Patient: Monty Richmond        Sex: female          DOA: 8/25/2022  YOB: 1945      Age:  65 y.o.        LOS:  LOS: 6 days . 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    Acute blood loss anemia D62    DDD (degenerative disc disease), lumbar M51.36    Spondylolisthesis of lumbar region M43.16    Scoliosis of thoracolumbar spine M41.9    SIRS (systemic inflammatory response syndrome) ([address] R65.10    History of back surgery Z98.890    Atelectasis J98.11    New onset a-fib ([address] I48.91    Atrial fibrillation ([address] I48.91    Dizziness R42    Ataxia R27.0    Constipated K59.00       Paroxysmal atrial fibrillation CHADsVASc score is 3.0    Echocardiogram revealed       Left Ventricle: Normal left ventricular systolic function with a visually estimated EF of 55 - 60%. Left ventricle size is normal. Normal wall thickness. Normal wall motion. Normal diastolic function. Aortic Valve: Not well visualized. Mildly calcified cusp. Mild to moderate regurgitation. No stenosis. Pulmonary Arteries: Pulmonary artery was not well visualized. The estimated PASP is 25 mmHg. Telemetry monitor revealed currently she is in atrial fibrillation. Atrial fibrillation with fluctuating ventricular rate response    Plan:     Discontinue digoxin. Start oral amiodarone. Continue Full dose of Lovenox for now. Fall precaution  Plan discussed with patient and family member and they verbally understood and agreed. Subjective:    cc:  Complaining of palpitation off and on    REVIEW OF SYSTEMS:     General: No fevers or chills. Cardiovascular: No chest pain,Positive palpitations, No orthopnea, No PND, No leg swelling, No claudication  Pulmonary: No  dyspnea. Gastrointestinal: No nausea, vomiting, bleeding  Neurology: No Dizziness    Objective:      Visit Vitals  /63   Pulse (!) 115   Temp 98 °F (36.7 °C)   Resp 19   Ht 5' (1.524 m)   Wt 49.8 kg (109 lb 12.8 oz)   SpO2 98%   BMI 21.44 kg/m²     Body mass index is 21.44 kg/m². Physical Exam:  General Appearance: Comfortable, not using accessory muscles of respiration. HEENT: FREDDY. HEAD: Atraumatic  NECK: No JVD, no thyroidomeglay. CAROTIDS: no bruit  LUNGS: Clear bilaterally. HEART: S1+S2 audible,  irregularly irregular, no murmur, no pericardial rub. ABD: Non-tender, BS Audible    EXT: No edema, and no cyanosis. VASCULAR EXAM: Pulses are intact. PSYCHIATRIC EXAM: Mood is appropriate. MUSCULOSKELETAL: Grossly no joint deformity.   NEUROLOGICAL: AAO times 3, No motor and sensory deficit    Medication:  Current Facility-Administered Medications   Medication Dose Route Frequency    lidocaine 4 % patch 1 Patch  1 Patch TransDERmal Q24H    amiodarone (CORDARONE) tablet 400 mg  400 mg Oral Q12H    pantoprazole (PROTONIX) tablet 40 mg  40 mg Oral ACB    enoxaparin (LOVENOX) injection 50 mg  1 mg/kg SubCUTAneous Q12H    sodium chloride (NS) flush 5-40 mL  5-40 mL IntraVENous Q8H    sodium chloride (NS) flush 5-40 mL  5-40 mL IntraVENous PRN    acetaminophen (TYLENOL) tablet 650 mg  650 mg Oral Q6H PRN    Or    acetaminophen (TYLENOL) suppository 650 mg  650 mg Rectal Q6H PRN    polyethylene glycol (MIRALAX) packet 17 g  17 g Oral DAILY PRN    ondansetron (ZOFRAN ODT) tablet 4 mg  4 mg Oral Q8H PRN    Or    ondansetron (ZOFRAN) injection 4 mg  4 mg IntraVENous Q6H PRN    gabapentin (NEURONTIN) capsule 400 mg  400 mg Oral QHS    ascorbic acid (vitamin C) (VITAMIN C) tablet 500 mg  500 mg Oral DAILY    polyethylene glycol (MIRALAX) packet 17 g  17 g Oral BID    senna-docusate (PERICOLACE) 8.6-50 mg per tablet 1 Tablet  1 Tablet Oral DAILY    bisacodyL (DULCOLAX) suppository 10 mg  10 mg Rectal DAILY PRN Lab/Data Reviewed:       Recent Labs     08/30/22 0225   WBC 7.1   HGB 12.8   HCT 39.8          Recent Labs     08/30/22 0225      K 3.8      CO2 25   GLU 94   BUN 19*   CREA 0.51*   CA 9.6         Signed By: Gee Magaña MD     September 1, 2022

## 2022-09-02 NOTE — PROGRESS NOTES
Bedside and Verbal shift change report given to Rody Heart (oncoming nurse) by Abner Little (offgoing nurse).  Report included the following information SBAR, Kardex, MAR, Recent Results, and Cardiac Rhythm SR .

## 2022-09-02 NOTE — ROUTINE PROCESS
Bedside and Verbal shift change report given to Pritesh Hanna [place] [address]  (oncoming nurse) by Corrinne Lab, RN  (offgoing nurse). Report given with SBAR, Kardex, Intake/Output and Recent Results.

## 2023-01-06 ENCOUNTER — APPOINTMENT (OUTPATIENT)
Dept: GENERAL RADIOLOGY | Age: 78
DRG: 309 | End: 2023-01-06
Attending: EMERGENCY MEDICINE
Payer: MEDICARE

## 2023-01-06 ENCOUNTER — HOSPITAL ENCOUNTER (EMERGENCY)
Age: 78
Discharge: HOME | DRG: 309 | End: 2023-01-06
Attending: EMERGENCY MEDICINE
Payer: MEDICARE

## 2023-01-06 VITALS
HEART RATE: 62 BPM | BODY MASS INDEX: 22.09 KG/M2 | SYSTOLIC BLOOD PRESSURE: 128 MMHG | DIASTOLIC BLOOD PRESSURE: 52 MMHG | HEIGHT: 61 IN | TEMPERATURE: 97.6 F | WEIGHT: 117 LBS | OXYGEN SATURATION: 100 % | RESPIRATION RATE: 20 BRPM

## 2023-01-06 DIAGNOSIS — R06.00 DYSPNEA, UNSPECIFIED TYPE: Primary | ICD-10-CM

## 2023-01-06 LAB
ALBUMIN SERPL-MCNC: 3.8 G/DL (ref 3.4–5)
ALBUMIN/GLOB SERPL: 1.2 (ref 0.8–1.7)
ALP SERPL-CCNC: 83 U/L (ref 45–117)
ALT SERPL-CCNC: 30 U/L (ref 13–56)
ANION GAP SERPL CALC-SCNC: 4 MMOL/L (ref 3–18)
AST SERPL-CCNC: 24 U/L (ref 10–38)
ATRIAL RATE: 100 BPM
ATRIAL RATE: 62 BPM
BASOPHILS # BLD: 0 K/UL (ref 0–0.1)
BASOPHILS NFR BLD: 0 % (ref 0–2)
BILIRUB SERPL-MCNC: 0.5 MG/DL (ref 0.2–1)
BUN SERPL-MCNC: 20 MG/DL (ref 7–18)
BUN/CREAT SERPL: 20 (ref 12–20)
CALCIUM SERPL-MCNC: 9.6 MG/DL (ref 8.5–10.1)
CALCULATED P AXIS, ECG09: 57 DEGREES
CALCULATED R AXIS, ECG10: 17 DEGREES
CALCULATED R AXIS, ECG10: 34 DEGREES
CALCULATED T AXIS, ECG11: 53 DEGREES
CALCULATED T AXIS, ECG11: 54 DEGREES
CHLORIDE SERPL-SCNC: 104 MMOL/L (ref 100–111)
CO2 SERPL-SCNC: 30 MMOL/L (ref 21–32)
CREAT SERPL-MCNC: 1 MG/DL (ref 0.6–1.3)
DIAGNOSIS, 93000: NORMAL
DIAGNOSIS, 93000: NORMAL
DIFFERENTIAL METHOD BLD: ABNORMAL
EOSINOPHIL # BLD: 0.1 K/UL (ref 0–0.4)
EOSINOPHIL NFR BLD: 2 % (ref 0–5)
ERYTHROCYTE [DISTWIDTH] IN BLOOD BY AUTOMATED COUNT: 13.8 % (ref 11.6–14.5)
FLUAV RNA SPEC QL NAA+PROBE: NOT DETECTED
FLUBV RNA SPEC QL NAA+PROBE: NOT DETECTED
GLOBULIN SER CALC-MCNC: 3.1 G/DL (ref 2–4)
GLUCOSE SERPL-MCNC: 108 MG/DL (ref 74–99)
HCT VFR BLD AUTO: 39.7 % (ref 35–45)
HGB BLD-MCNC: 13.1 G/DL (ref 12–16)
IMM GRANULOCYTES # BLD AUTO: 0 K/UL (ref 0–0.04)
IMM GRANULOCYTES NFR BLD AUTO: 0 % (ref 0–0.5)
LYMPHOCYTES # BLD: 1.1 K/UL (ref 0.9–3.6)
LYMPHOCYTES NFR BLD: 17 % (ref 21–52)
MAGNESIUM SERPL-MCNC: 2.3 MG/DL (ref 1.6–2.6)
MCH RBC QN AUTO: 33 PG (ref 24–34)
MCHC RBC AUTO-ENTMCNC: 33 G/DL (ref 31–37)
MCV RBC AUTO: 100 FL (ref 78–100)
MONOCYTES # BLD: 0.8 K/UL (ref 0.05–1.2)
MONOCYTES NFR BLD: 12 % (ref 3–10)
NEUTS SEG # BLD: 4.6 K/UL (ref 1.8–8)
NEUTS SEG NFR BLD: 69 % (ref 40–73)
NRBC # BLD: 0 K/UL (ref 0–0.01)
NRBC BLD-RTO: 0 PER 100 WBC
P-R INTERVAL, ECG05: 152 MS
P-R INTERVAL, ECG05: 158 MS
PLATELET # BLD AUTO: 261 K/UL (ref 135–420)
PMV BLD AUTO: 9.8 FL (ref 9.2–11.8)
POTASSIUM SERPL-SCNC: 3.9 MMOL/L (ref 3.5–5.5)
PROT SERPL-MCNC: 6.9 G/DL (ref 6.4–8.2)
Q-T INTERVAL, ECG07: 414 MS
Q-T INTERVAL, ECG07: 468 MS
QRS DURATION, ECG06: 100 MS
QRS DURATION, ECG06: 96 MS
QTC CALCULATION (BEZET), ECG08: 475 MS
QTC CALCULATION (BEZET), ECG08: 534 MS
RBC # BLD AUTO: 3.97 M/UL (ref 4.2–5.3)
SARS-COV-2, COV2: NOT DETECTED
SODIUM SERPL-SCNC: 138 MMOL/L (ref 136–145)
TROPONIN-HIGH SENSITIVITY: 4 NG/L (ref 0–54)
TROPONIN-HIGH SENSITIVITY: 5 NG/L (ref 0–54)
VENTRICULAR RATE, ECG03: 100 BPM
VENTRICULAR RATE, ECG03: 62 BPM
WBC # BLD AUTO: 6.8 K/UL (ref 4.6–13.2)

## 2023-01-06 PROCEDURE — 93005 ELECTROCARDIOGRAM TRACING: CPT

## 2023-01-06 PROCEDURE — 80053 COMPREHEN METABOLIC PANEL: CPT

## 2023-01-06 PROCEDURE — 99285 EMERGENCY DEPT VISIT HI MDM: CPT

## 2023-01-06 PROCEDURE — 83735 ASSAY OF MAGNESIUM: CPT

## 2023-01-06 PROCEDURE — 87636 SARSCOV2 & INF A&B AMP PRB: CPT

## 2023-01-06 PROCEDURE — 85025 COMPLETE CBC W/AUTO DIFF WBC: CPT

## 2023-01-06 PROCEDURE — 71045 X-RAY EXAM CHEST 1 VIEW: CPT

## 2023-01-06 PROCEDURE — 84484 ASSAY OF TROPONIN QUANT: CPT

## 2023-01-06 NOTE — ED PROVIDER NOTES
EMERGENCY DEPARTMENT HISTORY AND PHYSICAL EXAM      Date: 1/6/2023  Patient Name: Alicja Robins    History of Presenting Illness     Chief Complaint   Patient presents with    Shortness of Breath       History Provided By: Patient, Patient's , and EMS     History Latoyankechi Hancock):   1:57 AM  Alicja Robins is a 65 y.o. female with no contributory PMHx who presents to the emergency department (room 3) by EMS C/O shortness of breath onset tonight. Pt denies fevers, chills, nausea, vomiting, chest pain or any other sxs or complaints. Patient was given a nebulizer in route to the ED. She states that it helped her feel better. PCP: Tigre Remy MD     Past History     Past Medical History:  Past Medical History:   Diagnosis Date    Arthritis     Chronic pain     lower back    GERD (gastroesophageal reflux disease)     Ill-defined condition     Gita cerebella ataxia    Menopause     Age 47    Other ill-defined conditions(799.89)     Gita's Cerebellar Ataxia    Other ill-defined conditions(799.89)     Pessary    Psychiatric disorder     depression       Past Surgical History:  Past Surgical History:   Procedure Laterality Date    HX CERVICAL FUSION  2012    anterior    HX GI      prolapse rectum    HX HEENT      OS muscle surgery    HX ORTHOPAEDIC      Right knee ORIF    HX ORTHOPAEDIC  2014    right total hip    HX OTHER SURGICAL      repair rectal prolapse       Family History:  Family History   Problem Relation Age of Onset    Breast Cancer Maternal Aunt        Social History:  Social History     Tobacco Use    Smoking status: Never    Smokeless tobacco: Never   Substance Use Topics    Alcohol use: Yes     Alcohol/week: 0.8 standard drinks     Types: 1 Glasses of wine per week    Drug use: No       Medications:  Current Outpatient Medications   Medication Sig Dispense Refill    amiodarone (CORDARONE) 200 mg tablet Take 1 Tablet by mouth daily.  30 Tablet 0    gabapentin (NEURONTIN) 400 mg capsule Take 1 Capsule by mouth nightly. Max Daily Amount: 400 mg. 10 Capsule 0    famotidine (PEPCID) 20 mg tablet Take 1 Tab by mouth two (2) times daily as needed for Pain or Nausea for up to 30 doses. 30 Tab 0    polyethylene glycol (MIRALAX) 17 gram/dose powder Take 17 g by mouth daily. 1 tablespoon with 8 oz of water daily 300 g 0    CALCIUM CARBONATE/VITAMIN D3 (CALCIUM 500 + D PO) Take 1 Tab by mouth two (2) times a day. MULTIVIT-MIN/IRON/FOLIC/LUTEIN (CENTRUM SILVER WOMEN PO) Take  by mouth daily. PARoxetine (PAXIL) 20 mg tablet Take 20 mg by mouth daily. ascorbic acid (VITAMIN C) 500 mg tablet Take 500 mg by mouth daily. cholecalciferol (VITAMIN D3) 1,000 unit tablet Take 1,000 Units by mouth daily. omega-3 fatty acids-vitamin e (FISH OIL) 1,000 mg Cap Take 1 Cap by mouth daily. Allergies:  No Known Allergies    Social Determinants of Health:  Social Determinants of Health     Tobacco Use: Low Risk     Smoking Tobacco Use: Never    Smokeless Tobacco Use: Never    Passive Exposure: Not on file   Alcohol Use: Not on file   Financial Resource Strain: Not on file   Food Insecurity: Not on file   Transportation Needs: Not on file   Physical Activity: Not on file   Stress: Not on file   Social Connections: Not on file   Intimate Partner Violence: Not on file   Depression: Not on file   Housing Stability: Not on file       Review of Systems      Review of Systems   Respiratory:  Positive for shortness of breath. All other systems reviewed and are negative. Physical Exam     Vitals:    01/06/23 0156   BP: (!) 128/52   Pulse: 62   Resp: 20   Temp: 97.6 °F (36.4 °C)   SpO2: 100%   Weight: 53.1 kg (117 lb)   Height: 5' 1\" (1.549 m)       Physical Exam  Vitals and nursing note reviewed. Constitutional:       General: She is not in acute distress. Appearance: Normal appearance. HENT:      Head: Normocephalic and atraumatic.    Eyes:      Extraocular Movements: Extraocular movements intact. Conjunctiva/sclera: Conjunctivae normal.      Pupils: Pupils are equal, round, and reactive to light. Cardiovascular:      Rate and Rhythm: Normal rate and regular rhythm. Heart sounds: No murmur heard. No friction rub. No gallop. Pulmonary:      Effort: Pulmonary effort is normal.      Breath sounds: Normal breath sounds. No wheezing, rhonchi or rales. Abdominal:      General: Abdomen is flat. Bowel sounds are normal.      Palpations: Abdomen is soft. Tenderness: There is no abdominal tenderness. There is no guarding or rebound. Musculoskeletal:         General: Normal range of motion. Skin:     General: Skin is warm and dry. Neurological:      General: No focal deficit present. Mental Status: She is alert and oriented to person, place, and time.    Psychiatric:         Mood and Affect: Mood normal.         Behavior: Behavior normal.       Diagnostic Study Results     Labs:  Recent Results (from the past 12 hour(s))   EKG, 12 LEAD, INITIAL    Collection Time: 01/06/23  1:56 AM   Result Value Ref Range    Ventricular Rate 62 BPM    Atrial Rate 62 BPM    P-R Interval 158 ms    QRS Duration 96 ms    Q-T Interval 468 ms    QTC Calculation (Bezet) 475 ms    Calculated P Axis 57 degrees    Calculated R Axis 34 degrees    Calculated T Axis 54 degrees    Diagnosis       Normal sinus rhythm  Normal ECG  When compared with ECG of 26-AUG-2022 00:31,  QT has lengthened     CBC WITH AUTOMATED DIFF    Collection Time: 01/06/23  2:00 AM   Result Value Ref Range    WBC 6.8 4.6 - 13.2 K/uL    RBC 3.97 (L) 4.20 - 5.30 M/uL    HGB 13.1 12.0 - 16.0 g/dL    HCT 39.7 35.0 - 45.0 %    .0 78.0 - 100.0 FL    MCH 33.0 24.0 - 34.0 PG    MCHC 33.0 31.0 - 37.0 g/dL    RDW 13.8 11.6 - 14.5 %    PLATELET 200 948 - 437 K/uL    MPV 9.8 9.2 - 11.8 FL    NRBC 0.0 0  WBC    ABSOLUTE NRBC 0.00 0.00 - 0.01 K/uL    NEUTROPHILS 69 40 - 73 %    LYMPHOCYTES 17 (L) 21 - 52 %    MONOCYTES 12 (H) 3 - 10 %    EOSINOPHILS 2 0 - 5 %    BASOPHILS 0 0 - 2 %    IMMATURE GRANULOCYTES 0 0.0 - 0.5 %    ABS. NEUTROPHILS 4.6 1.8 - 8.0 K/UL    ABS. LYMPHOCYTES 1.1 0.9 - 3.6 K/UL    ABS. MONOCYTES 0.8 0.05 - 1.2 K/UL    ABS. EOSINOPHILS 0.1 0.0 - 0.4 K/UL    ABS. BASOPHILS 0.0 0.0 - 0.1 K/UL    ABS. IMM. GRANS. 0.0 0.00 - 0.04 K/UL    DF AUTOMATED     METABOLIC PANEL, COMPREHENSIVE    Collection Time: 01/06/23  2:00 AM   Result Value Ref Range    Sodium 138 136 - 145 mmol/L    Potassium 3.9 3.5 - 5.5 mmol/L    Chloride 104 100 - 111 mmol/L    CO2 30 21 - 32 mmol/L    Anion gap 4 3.0 - 18 mmol/L    Glucose 108 (H) 74 - 99 mg/dL    BUN 20 (H) 7.0 - 18 MG/DL    Creatinine 1.00 0.6 - 1.3 MG/DL    BUN/Creatinine ratio 20 12 - 20      eGFR 58 (L) >60 ml/min/1.73m2    Calcium 9.6 8.5 - 10.1 MG/DL    Bilirubin, total 0.5 0.2 - 1.0 MG/DL    ALT (SGPT) 30 13 - 56 U/L    AST (SGOT) 24 10 - 38 U/L    Alk.  phosphatase 83 45 - 117 U/L    Protein, total 6.9 6.4 - 8.2 g/dL    Albumin 3.8 3.4 - 5.0 g/dL    Globulin 3.1 2.0 - 4.0 g/dL    A-G Ratio 1.2 0.8 - 1.7     MAGNESIUM    Collection Time: 01/06/23  2:00 AM   Result Value Ref Range    Magnesium 2.3 1.6 - 2.6 mg/dL   TROPONIN-HIGH SENSITIVITY    Collection Time: 01/06/23  2:00 AM   Result Value Ref Range    Troponin-High Sensitivity 4 0 - 54 ng/L   COVID-19 WITH INFLUENZA A/B    Collection Time: 01/06/23  3:35 AM   Result Value Ref Range    SARS-CoV-2 by PCR Not detected NOTD      Influenza A by PCR Not detected NOTD      Influenza B by PCR Not detected NOTD     EKG, 12 LEAD, SUBSEQUENT    Collection Time: 01/06/23  4:23 AM   Result Value Ref Range    Ventricular Rate 100 BPM    Atrial Rate 100 BPM    P-R Interval 152 ms    QRS Duration 100 ms    Q-T Interval 414 ms    QTC Calculation (Bezet) 534 ms    Calculated R Axis 17 degrees    Calculated T Axis 53 degrees    Diagnosis       Normal sinus rhythm  Cannot rule out Anterior infarct , age undetermined  Prolonged QT  Abnormal ECG  When compared with ECG of 06-JAN-2023 01:56,  Vent. rate has increased BY  38 BPM  ST now depressed in Inferior leads  ST now depressed in Anterolateral leads  QT has lengthened     TROPONIN-HIGH SENSITIVITY    Collection Time: 01/06/23  4:30 AM   Result Value Ref Range    Troponin-High Sensitivity 5 0 - 54 ng/L       Radiologic Studies:   XR CHEST PORT   Final Result      1. No acute cardiopulmonary process. CT Results  (Last 48 hours)      None          CXR Results  (Last 48 hours)                 01/06/23 0214  XR CHEST PORT Final result    Impression:      1. No acute cardiopulmonary process. Narrative:  EXAM: XR CHEST PORT       CLINICAL INDICATION/HISTORY: dyspnea   -Additional: A 2622, 8/25/2022, 1/8/2020       COMPARISON: None       TECHNIQUE: Frontal view of the chest       _______________       FINDINGS:       HEART AND MEDIASTINUM: Midline cardiac silhouette, stable in contour there is   stable tortuous thoracic aorta. LUNGS AND PLEURAL SPACES: Bilateral lower thoracic skinfold artifacts are   present No focal consolidation, parenchymal opacity. No pneumothorax or pleural   effusion. BONY THORAX AND SOFT TISSUES: No acute or destructive osseous abnormality. _______________                   Procedures     Procedures    ED Course     1:57 AM I Chyna Adair MD) am the first provider for this patient. Initial assessment performed. I reviewed the vital signs, available nursing notes, past medical history, past surgical history, family history and social history. The patients presenting problems have been discussed, and they are in agreement with the care plan formulated and outlined with them. I have encouraged them to ask questions as they arise throughout their visit.     Records Reviewed: Nursing Notes    Cardiac Monitor:  Rate: 62 bpm  Rhythm: Sinus Rhythm    Pulse Oximetry Analysis - 100% on RA    MEDICATIONS ADMINISTERED IN THE ED:  Medications - No data to display    ED Course as of 01/06/23 0545   Fri Jan 06, 2023   0156 EKG interpretation: (Preliminary)  Rhythm: NSR. Rate: 62 bpm; no STEMI  EKG read by Maynor Eason MD at 1:56 AM []   0210 RADIOLOGY FINDINGS:  Chest X-ray shows no acute cardiopulmonary process. Interpreted by Maynor Eason MD.  Pending review by Radiologist   [JM]   0044 EKG interpretation: (Repeat)  Rhythm: NSR. Rate: 100 bpm; no STEMI, ST depression in leads II and aVF  EKG read by Maynor Eason MD at 4:23 AM [JM]      ED Course User Index  [JM] Codie Mckoy MD       Medical Decision Making     DDX: Influenza, COVID, pneumonia, unlikely ACS    DISCUSSION:  This appears to be a mild contion. This appears to be an acute condition. 65 y.o. female with shortness of breath tonight. In evaluation of the above differential diagnosis, consideration was given to the following tests and treatments. Considered Influenza and COVID swabs. Both were negative. Consider chest x-ray for pneumonia chest x-ray did not demonstrate any signs of consolidation. Considered CMP and magnesium for electrolyte disorder. All electrolytes were within normal limits. Considered serial EKGs for arrhythmias or ST elevation MI's. EKG is within normal limits. Considered troponins for signs of ACS. Troponins were within normal limits and were not uptrending. The decision to perform testing and results were discussed with the patient and family. Patient with shortness of breath which is improved in the ambulance with a nebulizer. Patient was never hypoxic or in extremis. She had no explanation for her difficulty breathing in the ED and she remained 100% on room air throughout her stay here. No indication for further work-up for admission to the hospital.  Patient can follow-up with her primary care doctor and her cardiologist (scheduled next week already). I discussed each of these tests and considerations with the patient and family.  The patient and family agrees with the plan of discharge. Additional Considerations:  None    Diagnosis and Disposition     5:43 AM   DISCHARGE NOTE:  Silvia Villaseñor's results have been reviewed with her. She has been counseled regarding her diagnosis, treatment, and plan. She verbally conveys understanding and agreement of the signs, symptoms, diagnosis, treatment and prognosis and additionally agrees to follow up as discussed. She also agrees with the care-plan and conveys that all of her questions have been answered. I have also provided discharge instructions for her that include: educational information regarding their diagnosis and treatment, and list of reasons why they would want to return to the ED prior to their follow-up appointment, should her condition change. She has been provided with education for proper emergency department utilization. CLINICAL IMPRESSION:    1. Dyspnea, unspecified type        PLAN:  1. D/C Home  2. Current Discharge Medication List        3. Follow-up Information       Follow up With Specialties Details Why Contact Info    Sekou Wall MD Internal Medicine Physician Schedule an appointment as soon as possible for a visit  As soon as possible, For follow up from Emergency Department visit. [address] [place]       THE [facility] EMERGENCY DEPT Emergency Medicine  As needed; If symptoms worsen [address] [place] [phone] [facility] [place]in Inocencia Jolly MD am the primary clinician of record. Dragon Disclaimer     Please note that this dictation was completed with [employer], the computer voice recognition software. Quite often unanticipated grammatical, syntax, homophones, and other interpretive errors are inadvertently transcribed by the computer software. Please disregard these errors. Please excuse any errors that have escaped final proofreading.     Inocencia Jolly MD

## 2023-01-06 NOTE — ED TRIAGE NOTES
Pt arrived via EMS with c.o sudden onset of SOB that woke her up from her sleep this evening. Per EMS pt was sating 100%, received 1 breathing treatment en route and \"is feeling much better\". Pt denies any CP.

## 2023-01-08 ENCOUNTER — HOSPITAL ENCOUNTER (EMERGENCY)
Age: 78
Discharge: HOME | DRG: 309 | End: 2023-01-08
Attending: EMERGENCY MEDICINE
Payer: MEDICARE

## 2023-01-08 ENCOUNTER — HOSPITAL ENCOUNTER (INPATIENT)
Age: 78
LOS: 2 days | Discharge: HOME | DRG: 309 | End: 2023-01-11
Attending: EMERGENCY MEDICINE | Admitting: INTERNAL MEDICINE
Payer: MEDICARE

## 2023-01-08 ENCOUNTER — HOSPITAL ENCOUNTER (EMERGENCY)
Dept: GENERAL RADIOLOGY | Age: 78
Discharge: HOME | DRG: 309 | End: 2023-01-08
Attending: EMERGENCY MEDICINE
Payer: MEDICARE

## 2023-01-08 ENCOUNTER — APPOINTMENT (OUTPATIENT)
Dept: GENERAL RADIOLOGY | Age: 78
DRG: 309 | End: 2023-01-08
Attending: EMERGENCY MEDICINE
Payer: MEDICARE

## 2023-01-08 VITALS
TEMPERATURE: 97.3 F | HEIGHT: 61 IN | OXYGEN SATURATION: 95 % | BODY MASS INDEX: 22.09 KG/M2 | WEIGHT: 117 LBS | RESPIRATION RATE: 19 BRPM | HEART RATE: 62 BPM | DIASTOLIC BLOOD PRESSURE: 57 MMHG | SYSTOLIC BLOOD PRESSURE: 106 MMHG

## 2023-01-08 DIAGNOSIS — I48.0 PAROXYSMAL ATRIAL FIBRILLATION (HCC): ICD-10-CM

## 2023-01-08 DIAGNOSIS — R06.00 DYSPNEA, UNSPECIFIED TYPE: Primary | ICD-10-CM

## 2023-01-08 DIAGNOSIS — I48.0 PAROXYSMAL A-FIB (HCC): ICD-10-CM

## 2023-01-08 DIAGNOSIS — N39.0 ACUTE UTI: ICD-10-CM

## 2023-01-08 DIAGNOSIS — E78.2 MIXED HYPERLIPIDEMIA: ICD-10-CM

## 2023-01-08 LAB
ALBUMIN SERPL-MCNC: 4.4 G/DL (ref 3.4–5)
ALBUMIN/GLOB SERPL: 1.3 (ref 0.8–1.7)
ALP SERPL-CCNC: 87 U/L (ref 45–117)
ALT SERPL-CCNC: 35 U/L (ref 13–56)
ANION GAP SERPL CALC-SCNC: 6 MMOL/L (ref 3–18)
ANION GAP SERPL CALC-SCNC: 8 MMOL/L (ref 3–18)
APPEARANCE UR: CLEAR
AST SERPL-CCNC: 24 U/L (ref 10–38)
ATRIAL RATE: 59 BPM
ATRIAL RATE: 60 BPM
BACTERIA URNS QL MICRO: ABNORMAL /HPF
BASOPHILS # BLD: 0 K/UL (ref 0–0.1)
BASOPHILS # BLD: 0.1 K/UL (ref 0–0.1)
BASOPHILS NFR BLD: 0 % (ref 0–2)
BASOPHILS NFR BLD: 1 % (ref 0–2)
BILIRUB SERPL-MCNC: 0.8 MG/DL (ref 0.2–1)
BILIRUB UR QL: NEGATIVE
BNP SERPL-MCNC: 101 PG/ML (ref 0–1800)
BUN SERPL-MCNC: 24 MG/DL (ref 7–18)
BUN SERPL-MCNC: 25 MG/DL (ref 7–18)
BUN/CREAT SERPL: 25 (ref 12–20)
BUN/CREAT SERPL: 27 (ref 12–20)
CALCIUM SERPL-MCNC: 10.1 MG/DL (ref 8.5–10.1)
CALCIUM SERPL-MCNC: 9.3 MG/DL (ref 8.5–10.1)
CALCULATED P AXIS, ECG09: 40 DEGREES
CALCULATED P AXIS, ECG09: 81 DEGREES
CALCULATED R AXIS, ECG10: -7 DEGREES
CALCULATED R AXIS, ECG10: 11 DEGREES
CALCULATED T AXIS, ECG11: 36 DEGREES
CALCULATED T AXIS, ECG11: 46 DEGREES
CHLORIDE SERPL-SCNC: 102 MMOL/L (ref 100–111)
CHLORIDE SERPL-SCNC: 106 MMOL/L (ref 100–111)
CO2 SERPL-SCNC: 25 MMOL/L (ref 21–32)
CO2 SERPL-SCNC: 30 MMOL/L (ref 21–32)
COLOR UR: YELLOW
CREAT SERPL-MCNC: 0.92 MG/DL (ref 0.6–1.3)
CREAT SERPL-MCNC: 0.96 MG/DL (ref 0.6–1.3)
DIAGNOSIS, 93000: NORMAL
DIAGNOSIS, 93000: NORMAL
DIFFERENTIAL METHOD BLD: ABNORMAL
DIFFERENTIAL METHOD BLD: ABNORMAL
EOSINOPHIL # BLD: 0.1 K/UL (ref 0–0.4)
EOSINOPHIL # BLD: 0.1 K/UL (ref 0–0.4)
EOSINOPHIL NFR BLD: 1 % (ref 0–5)
EOSINOPHIL NFR BLD: 1 % (ref 0–5)
EPITH CASTS URNS QL MICRO: ABNORMAL /LPF (ref 0–5)
ERYTHROCYTE [DISTWIDTH] IN BLOOD BY AUTOMATED COUNT: 14.1 % (ref 11.6–14.5)
ERYTHROCYTE [DISTWIDTH] IN BLOOD BY AUTOMATED COUNT: 14.2 % (ref 11.6–14.5)
GLOBULIN SER CALC-MCNC: 3.4 G/DL (ref 2–4)
GLUCOSE SERPL-MCNC: 108 MG/DL (ref 74–99)
GLUCOSE SERPL-MCNC: 116 MG/DL (ref 74–99)
GLUCOSE UR STRIP.AUTO-MCNC: NEGATIVE MG/DL
HCT VFR BLD AUTO: 39.3 % (ref 35–45)
HCT VFR BLD AUTO: 42.2 % (ref 35–45)
HGB BLD-MCNC: 12.9 G/DL (ref 12–16)
HGB BLD-MCNC: 13.9 G/DL (ref 12–16)
HGB UR QL STRIP: NEGATIVE
IMM GRANULOCYTES # BLD AUTO: 0 K/UL (ref 0–0.04)
IMM GRANULOCYTES # BLD AUTO: 0.1 K/UL (ref 0–0.04)
IMM GRANULOCYTES NFR BLD AUTO: 1 % (ref 0–0.5)
IMM GRANULOCYTES NFR BLD AUTO: 1 % (ref 0–0.5)
KETONES UR QL STRIP.AUTO: ABNORMAL MG/DL
LEUKOCYTE ESTERASE UR QL STRIP.AUTO: ABNORMAL
LYMPHOCYTES # BLD: 0.9 K/UL (ref 0.9–3.6)
LYMPHOCYTES # BLD: 0.9 K/UL (ref 0.9–3.6)
LYMPHOCYTES NFR BLD: 12 % (ref 21–52)
LYMPHOCYTES NFR BLD: 12 % (ref 21–52)
MAGNESIUM SERPL-MCNC: 2.1 MG/DL (ref 1.6–2.6)
MAGNESIUM SERPL-MCNC: 2.3 MG/DL (ref 1.6–2.6)
MCH RBC QN AUTO: 33.1 PG (ref 24–34)
MCH RBC QN AUTO: 33.6 PG (ref 24–34)
MCHC RBC AUTO-ENTMCNC: 32.8 G/DL (ref 31–37)
MCHC RBC AUTO-ENTMCNC: 32.9 G/DL (ref 31–37)
MCV RBC AUTO: 100.8 FL (ref 78–100)
MCV RBC AUTO: 101.9 FL (ref 78–100)
MONOCYTES # BLD: 0.8 K/UL (ref 0.05–1.2)
MONOCYTES # BLD: 0.8 K/UL (ref 0.05–1.2)
MONOCYTES NFR BLD: 10 % (ref 3–10)
MONOCYTES NFR BLD: 11 % (ref 3–10)
NEUTS SEG # BLD: 5.3 K/UL (ref 1.8–8)
NEUTS SEG # BLD: 5.8 K/UL (ref 1.8–8)
NEUTS SEG NFR BLD: 75 % (ref 40–73)
NEUTS SEG NFR BLD: 76 % (ref 40–73)
NITRITE UR QL STRIP.AUTO: NEGATIVE
NRBC # BLD: 0 K/UL (ref 0–0.01)
NRBC # BLD: 0 K/UL (ref 0–0.01)
NRBC BLD-RTO: 0 PER 100 WBC
NRBC BLD-RTO: 0 PER 100 WBC
P-R INTERVAL, ECG05: 156 MS
P-R INTERVAL, ECG05: 188 MS
PH UR STRIP: 5 (ref 5–8)
PLATELET # BLD AUTO: 271 K/UL (ref 135–420)
PLATELET # BLD AUTO: 277 K/UL (ref 135–420)
PMV BLD AUTO: 10.4 FL (ref 9.2–11.8)
PMV BLD AUTO: 10.8 FL (ref 9.2–11.8)
POTASSIUM SERPL-SCNC: 3.9 MMOL/L (ref 3.5–5.5)
POTASSIUM SERPL-SCNC: 4.1 MMOL/L (ref 3.5–5.5)
PROT SERPL-MCNC: 7.8 G/DL (ref 6.4–8.2)
PROT UR STRIP-MCNC: ABNORMAL MG/DL
Q-T INTERVAL, ECG07: 446 MS
Q-T INTERVAL, ECG07: 458 MS
QRS DURATION, ECG06: 98 MS
QRS DURATION, ECG06: 98 MS
QTC CALCULATION (BEZET), ECG08: 441 MS
QTC CALCULATION (BEZET), ECG08: 458 MS
RBC # BLD AUTO: 3.9 M/UL (ref 4.2–5.3)
RBC # BLD AUTO: 4.14 M/UL (ref 4.2–5.3)
RBC #/AREA URNS HPF: NEGATIVE /HPF (ref 0–5)
SODIUM SERPL-SCNC: 138 MMOL/L (ref 136–145)
SODIUM SERPL-SCNC: 139 MMOL/L (ref 136–145)
SP GR UR REFRACTOMETRY: >1.03 (ref 1–1.03)
TROPONIN-HIGH SENSITIVITY: 5 NG/L (ref 0–54)
TROPONIN-HIGH SENSITIVITY: 5 NG/L (ref 0–54)
URATE CRY URNS QL MICRO: ABNORMAL
UROBILINOGEN UR QL STRIP.AUTO: 1 EU/DL (ref 0.2–1)
VENTRICULAR RATE, ECG03: 59 BPM
VENTRICULAR RATE, ECG03: 60 BPM
WBC # BLD AUTO: 7.1 K/UL (ref 4.6–13.2)
WBC # BLD AUTO: 7.7 K/UL (ref 4.6–13.2)
WBC URNS QL MICRO: ABNORMAL /HPF (ref 0–5)

## 2023-01-08 PROCEDURE — 74011000250 HC RX REV CODE- 250: Performed by: EMERGENCY MEDICINE

## 2023-01-08 PROCEDURE — 71045 X-RAY EXAM CHEST 1 VIEW: CPT

## 2023-01-08 PROCEDURE — 94762 N-INVAS EAR/PLS OXIMTRY CONT: CPT

## 2023-01-08 PROCEDURE — 80053 COMPREHEN METABOLIC PANEL: CPT

## 2023-01-08 PROCEDURE — 74011250636 HC RX REV CODE- 250/636: Performed by: EMERGENCY MEDICINE

## 2023-01-08 PROCEDURE — 83735 ASSAY OF MAGNESIUM: CPT

## 2023-01-08 PROCEDURE — 84484 ASSAY OF TROPONIN QUANT: CPT

## 2023-01-08 PROCEDURE — 87086 URINE CULTURE/COLONY COUNT: CPT

## 2023-01-08 PROCEDURE — 81001 URINALYSIS AUTO W/SCOPE: CPT

## 2023-01-08 PROCEDURE — 99285 EMERGENCY DEPT VISIT HI MDM: CPT

## 2023-01-08 PROCEDURE — 74011250637 HC RX REV CODE- 250/637: Performed by: EMERGENCY MEDICINE

## 2023-01-08 PROCEDURE — 85025 COMPLETE CBC W/AUTO DIFF WBC: CPT

## 2023-01-08 PROCEDURE — 83880 ASSAY OF NATRIURETIC PEPTIDE: CPT

## 2023-01-08 RX ORDER — ALBUTEROL SULFATE 2.5 MG/.5ML
2.5 SOLUTION RESPIRATORY (INHALATION)
Status: COMPLETED | OUTPATIENT
Start: 2023-01-08 | End: 2023-01-08

## 2023-01-08 RX ORDER — FAMOTIDINE 10 MG/ML
20 INJECTION INTRAVENOUS
Status: COMPLETED | OUTPATIENT
Start: 2023-01-08 | End: 2023-01-08

## 2023-01-08 RX ORDER — LORAZEPAM 2 MG/ML
1 INJECTION INTRAMUSCULAR
Status: COMPLETED | OUTPATIENT
Start: 2023-01-08 | End: 2023-01-08

## 2023-01-08 RX ORDER — FUROSEMIDE 10 MG/ML
40 INJECTION INTRAMUSCULAR; INTRAVENOUS
Status: COMPLETED | OUTPATIENT
Start: 2023-01-08 | End: 2023-01-08

## 2023-01-08 RX ADMIN — LORAZEPAM 1 MG: 2 INJECTION INTRAMUSCULAR; INTRAVENOUS at 04:42

## 2023-01-08 RX ADMIN — ALBUTEROL SULFATE 2.5 MG: 2.5 SOLUTION RESPIRATORY (INHALATION) at 05:46

## 2023-01-08 RX ADMIN — FUROSEMIDE 40 MG: 10 INJECTION, SOLUTION INTRAMUSCULAR; INTRAVENOUS at 04:16

## 2023-01-08 RX ADMIN — LIDOCAINE HYDROCHLORIDE 40 ML: 20 SOLUTION ORAL; TOPICAL at 04:16

## 2023-01-08 RX ADMIN — FAMOTIDINE 20 MG: 10 INJECTION, SOLUTION INTRAVENOUS at 04:16

## 2023-01-08 NOTE — ED TRIAGE NOTES
Pt reports that she woke up out of her sleep and felt short of breath. Pt seen here 2 days ago with the same complaint.

## 2023-01-09 ENCOUNTER — APPOINTMENT (OUTPATIENT)
Dept: NON INVASIVE DIAGNOSTICS | Age: 78
DRG: 309 | End: 2023-01-09
Attending: INTERNAL MEDICINE
Payer: MEDICARE

## 2023-01-09 ENCOUNTER — APPOINTMENT (OUTPATIENT)
Dept: CT IMAGING | Age: 78
DRG: 309 | End: 2023-01-09
Attending: EMERGENCY MEDICINE
Payer: MEDICARE

## 2023-01-09 PROBLEM — I48.0 PAROXYSMAL ATRIAL FIBRILLATION (HCC): Status: ACTIVE | Noted: 2023-01-09

## 2023-01-09 PROBLEM — R06.02 SOB (SHORTNESS OF BREATH): Status: ACTIVE | Noted: 2023-01-09

## 2023-01-09 LAB
DIGOXIN SERPL-MCNC: 0.1 NG/ML (ref 0.9–2)
ECHO AO ASC DIAM: 3 CM
ECHO AO ASCENDING AORTA INDEX: 2.13 CM/M2
ECHO AR MAX VEL PISA: 4.1 M/S
ECHO AV MEAN GRADIENT: 4 MMHG
ECHO AV MEAN VELOCITY: 0.9 M/S
ECHO AV PEAK GRADIENT: 7 MMHG
ECHO AV PEAK VELOCITY: 1.3 M/S
ECHO AV REGURGITANT PHT: 585.1 MILLISECOND
ECHO AV VTI: 30.5 CM
ECHO EST RA PRESSURE: 3 MMHG
ECHO IVC PROX: 1.2 CM
ECHO LA VOL 2C: 41 ML (ref 22–52)
ECHO LA VOL 4C: 26 ML (ref 22–52)
ECHO LA VOL BP: 39 ML (ref 22–52)
ECHO LA VOL/BSA BIPLANE: 28 ML/M2 (ref 16–34)
ECHO LA VOLUME AREA LENGTH: 42 ML
ECHO LA VOLUME INDEX A2C: 29 ML/M2 (ref 16–34)
ECHO LA VOLUME INDEX A4C: 18 ML/M2 (ref 16–34)
ECHO LA VOLUME INDEX AREA LENGTH: 30 ML/M2 (ref 16–34)
ECHO LV E' LATERAL VELOCITY: 12 CM/S
ECHO LV E' SEPTAL VELOCITY: 8 CM/S
ECHO LV EDV A2C: 63 ML
ECHO LV EDV A4C: 89 ML
ECHO LV EDV BP: 76 ML (ref 56–104)
ECHO LV EDV INDEX A4C: 63 ML/M2
ECHO LV EDV INDEX BP: 54 ML/M2
ECHO LV EDV NDEX A2C: 45 ML/M2
ECHO LV EJECTION FRACTION A2C: 62 %
ECHO LV EJECTION FRACTION A4C: 65 %
ECHO LV EJECTION FRACTION BIPLANE: 63 % (ref 55–100)
ECHO LV ESV A2C: 24 ML
ECHO LV ESV A4C: 31 ML
ECHO LV ESV BP: 28 ML (ref 19–49)
ECHO LV ESV INDEX A2C: 17 ML/M2
ECHO LV ESV INDEX A4C: 22 ML/M2
ECHO LV ESV INDEX BP: 20 ML/M2
ECHO LV FRACTIONAL SHORTENING: 35 % (ref 28–44)
ECHO LV GLOBAL LONGITUDINAL STRAIN (GLS): -21.6 %
ECHO LV GLOBAL LONGITUDINAL STRAIN (GLS): -23.1 %
ECHO LV GLOBAL LONGITUDINAL STRAIN (GLS): -23.8 %
ECHO LV GLOBAL LONGITUDINAL STRAIN (GLS): -23.9 %
ECHO LV INTERNAL DIMENSION DIASTOLE INDEX: 3.4 CM/M2
ECHO LV INTERNAL DIMENSION DIASTOLIC: 4.8 CM (ref 3.9–5.3)
ECHO LV INTERNAL DIMENSION SYSTOLIC INDEX: 2.2 CM/M2
ECHO LV INTERNAL DIMENSION SYSTOLIC: 3.1 CM
ECHO LV IVSD: 0.8 CM (ref 0.6–0.9)
ECHO LV MASS 2D: 116.6 G (ref 67–162)
ECHO LV MASS INDEX 2D: 82.7 G/M2 (ref 43–95)
ECHO LV POSTERIOR WALL DIASTOLIC: 0.7 CM (ref 0.6–0.9)
ECHO LV RELATIVE WALL THICKNESS RATIO: 0.29
ECHO LVOT AREA: 3.1 CM2
ECHO LVOT DIAM: 2 CM
ECHO MV A VELOCITY: 0.85 M/S
ECHO MV E DECELERATION TIME (DT): 195 MS
ECHO MV E VELOCITY: 0.62 M/S
ECHO MV E/A RATIO: 0.73
ECHO MV E/E' LATERAL: 5.17
ECHO MV E/E' RATIO (AVERAGED): 6.46
ECHO MV E/E' SEPTAL: 7.75
ECHO PULMONARY ARTERY SYSTOLIC PRESSURE (PASP): 27 MMHG
ECHO RIGHT VENTRICULAR SYSTOLIC PRESSURE (RVSP): 27 MMHG
ECHO RV FREE WALL PEAK S': 15 CM/S
ECHO RV INTERNAL DIMENSION: 2.8 CM
ECHO RV TAPSE: 1.9 CM (ref 1.7–?)
ECHO TV REGURGITANT MAX VELOCITY: 2.45 M/S
ECHO TV REGURGITANT PEAK GRADIENT: 24 MMHG
FLUAV RNA SPEC QL NAA+PROBE: NOT DETECTED
FLUBV RNA SPEC QL NAA+PROBE: NOT DETECTED
MAGNESIUM SERPL-MCNC: 2.1 MG/DL (ref 1.6–2.6)
SARS-COV-2, COV2: NOT DETECTED
T4 FREE SERPL-MCNC: 0.6 NG/DL (ref 0.7–1.5)
TROPONIN-HIGH SENSITIVITY: 7 NG/L (ref 0–54)
TSH SERPL DL<=0.05 MIU/L-ACNC: 23.5 UIU/ML (ref 0.36–3.74)

## 2023-01-09 PROCEDURE — 83735 ASSAY OF MAGNESIUM: CPT

## 2023-01-09 PROCEDURE — 74011000258 HC RX REV CODE- 258: Performed by: EMERGENCY MEDICINE

## 2023-01-09 PROCEDURE — 74011250637 HC RX REV CODE- 250/637: Performed by: INTERNAL MEDICINE

## 2023-01-09 PROCEDURE — 74011250637 HC RX REV CODE- 250/637: Performed by: HOSPITALIST

## 2023-01-09 PROCEDURE — 80162 ASSAY OF DIGOXIN TOTAL: CPT

## 2023-01-09 PROCEDURE — 74011000636 HC RX REV CODE- 636: Performed by: INTERNAL MEDICINE

## 2023-01-09 PROCEDURE — 65270000046 HC RM TELEMETRY

## 2023-01-09 PROCEDURE — 74011250636 HC RX REV CODE- 250/636: Performed by: EMERGENCY MEDICINE

## 2023-01-09 PROCEDURE — 87040 BLOOD CULTURE FOR BACTERIA: CPT

## 2023-01-09 PROCEDURE — 93306 TTE W/DOPPLER COMPLETE: CPT

## 2023-01-09 PROCEDURE — 87636 SARSCOV2 & INF A&B AMP PRB: CPT

## 2023-01-09 PROCEDURE — 74011250636 HC RX REV CODE- 250/636: Performed by: INTERNAL MEDICINE

## 2023-01-09 PROCEDURE — 71275 CT ANGIOGRAPHY CHEST: CPT

## 2023-01-09 PROCEDURE — 74011000250 HC RX REV CODE- 250: Performed by: INTERNAL MEDICINE

## 2023-01-09 PROCEDURE — 84484 ASSAY OF TROPONIN QUANT: CPT

## 2023-01-09 PROCEDURE — 84439 ASSAY OF FREE THYROXINE: CPT

## 2023-01-09 PROCEDURE — 84443 ASSAY THYROID STIM HORMONE: CPT

## 2023-01-09 RX ORDER — POLYETHYLENE GLYCOL 3350 17 G/17G
17 POWDER, FOR SOLUTION ORAL DAILY PRN
Status: DISCONTINUED | OUTPATIENT
Start: 2023-01-09 | End: 2023-01-11 | Stop reason: HOSPADM

## 2023-01-09 RX ORDER — ASCORBIC ACID 250 MG
500 TABLET ORAL DAILY
Status: DISCONTINUED | OUTPATIENT
Start: 2023-01-09 | End: 2023-01-11 | Stop reason: HOSPADM

## 2023-01-09 RX ORDER — SODIUM CHLORIDE 0.9 % (FLUSH) 0.9 %
5-40 SYRINGE (ML) INJECTION AS NEEDED
Status: DISCONTINUED | OUTPATIENT
Start: 2023-01-09 | End: 2023-01-11 | Stop reason: HOSPADM

## 2023-01-09 RX ORDER — ENOXAPARIN SODIUM 100 MG/ML
40 INJECTION SUBCUTANEOUS DAILY
Status: DISCONTINUED | OUTPATIENT
Start: 2023-01-09 | End: 2023-01-10

## 2023-01-09 RX ORDER — CELECOXIB 100 MG/1
100 CAPSULE ORAL 2 TIMES DAILY
COMMUNITY

## 2023-01-09 RX ORDER — CHOLECALCIFEROL (VITAMIN D3) 125 MCG
CAPSULE ORAL
COMMUNITY
End: 2023-01-14

## 2023-01-09 RX ORDER — GABAPENTIN 400 MG/1
400 CAPSULE ORAL
Status: DISCONTINUED | OUTPATIENT
Start: 2023-01-09 | End: 2023-01-11 | Stop reason: HOSPADM

## 2023-01-09 RX ORDER — LORAZEPAM 2 MG/ML
1 INJECTION INTRAMUSCULAR
Status: COMPLETED | OUTPATIENT
Start: 2023-01-09 | End: 2023-01-09

## 2023-01-09 RX ORDER — ONDANSETRON 4 MG/1
4 TABLET, ORALLY DISINTEGRATING ORAL
Status: DISCONTINUED | OUTPATIENT
Start: 2023-01-09 | End: 2023-01-11 | Stop reason: HOSPADM

## 2023-01-09 RX ORDER — ACETAMINOPHEN 500 MG
1000 TABLET ORAL
COMMUNITY

## 2023-01-09 RX ORDER — SODIUM CHLORIDE 0.9 % (FLUSH) 0.9 %
5-40 SYRINGE (ML) INJECTION EVERY 8 HOURS
Status: DISCONTINUED | OUTPATIENT
Start: 2023-01-09 | End: 2023-01-11 | Stop reason: HOSPADM

## 2023-01-09 RX ORDER — OMEPRAZOLE 20 MG/1
20 CAPSULE, DELAYED RELEASE ORAL 2 TIMES DAILY
COMMUNITY

## 2023-01-09 RX ORDER — FERROUS SULFATE, DRIED 160(50) MG
1 TABLET, EXTENDED RELEASE ORAL DAILY
Status: DISCONTINUED | OUTPATIENT
Start: 2023-01-09 | End: 2023-01-11 | Stop reason: HOSPADM

## 2023-01-09 RX ORDER — LEVOTHYROXINE SODIUM 25 UG/1
25 TABLET ORAL
Status: DISCONTINUED | OUTPATIENT
Start: 2023-01-09 | End: 2023-01-11 | Stop reason: HOSPADM

## 2023-01-09 RX ORDER — ACETAMINOPHEN 650 MG/1
650 SUPPOSITORY RECTAL
Status: DISCONTINUED | OUTPATIENT
Start: 2023-01-09 | End: 2023-01-11 | Stop reason: HOSPADM

## 2023-01-09 RX ORDER — POLYETHYLENE GLYCOL 3350 17 G/17G
17 POWDER, FOR SOLUTION ORAL DAILY
Status: DISCONTINUED | OUTPATIENT
Start: 2023-01-09 | End: 2023-01-11 | Stop reason: HOSPADM

## 2023-01-09 RX ORDER — ONDANSETRON 2 MG/ML
4 INJECTION INTRAMUSCULAR; INTRAVENOUS
Status: DISCONTINUED | OUTPATIENT
Start: 2023-01-09 | End: 2023-01-11 | Stop reason: HOSPADM

## 2023-01-09 RX ORDER — AMIODARONE HYDROCHLORIDE 200 MG/1
200 TABLET ORAL DAILY
Status: DISCONTINUED | OUTPATIENT
Start: 2023-01-09 | End: 2023-01-11 | Stop reason: HOSPADM

## 2023-01-09 RX ORDER — ACETAMINOPHEN 325 MG/1
650 TABLET ORAL
Status: DISCONTINUED | OUTPATIENT
Start: 2023-01-09 | End: 2023-01-11 | Stop reason: HOSPADM

## 2023-01-09 RX ADMIN — POLYETHYLENE GLYCOL 3350 17 G: 17 POWDER, FOR SOLUTION ORAL at 08:38

## 2023-01-09 RX ADMIN — LORAZEPAM 1 MG: 2 INJECTION INTRAMUSCULAR; INTRAVENOUS at 04:15

## 2023-01-09 RX ADMIN — GABAPENTIN 400 MG: 400 CAPSULE ORAL at 22:39

## 2023-01-09 RX ADMIN — GABAPENTIN 400 MG: 400 CAPSULE ORAL at 03:41

## 2023-01-09 RX ADMIN — IOPAMIDOL 75 ML: 755 INJECTION, SOLUTION INTRAVENOUS at 03:40

## 2023-01-09 RX ADMIN — LEVOTHYROXINE SODIUM 25 MCG: 0.03 TABLET ORAL at 07:23

## 2023-01-09 RX ADMIN — ENOXAPARIN SODIUM 40 MG: 100 INJECTION SUBCUTANEOUS at 08:49

## 2023-01-09 RX ADMIN — CALCIUM CARBONATE-VITAMIN D TAB 500 MG-200 UNIT 1 TABLET: 500-200 TAB at 09:14

## 2023-01-09 RX ADMIN — CEFTRIAXONE 2 G: 2 INJECTION, POWDER, FOR SOLUTION INTRAMUSCULAR; INTRAVENOUS at 01:51

## 2023-01-09 RX ADMIN — SODIUM CHLORIDE, PRESERVATIVE FREE 10 ML: 5 INJECTION INTRAVENOUS at 14:00

## 2023-01-09 RX ADMIN — SODIUM CHLORIDE, PRESERVATIVE FREE 10 ML: 5 INJECTION INTRAVENOUS at 05:12

## 2023-01-09 RX ADMIN — Medication 500 MG: at 08:38

## 2023-01-09 NOTE — H&P
History & Physical    Patient: Leonardo Medina MRN: [id number]  CSN: [id number]    YOB: 1945  Age: 65 y.o. Sex: female      DOA: 1/8/2023    Chief Complaint:   Chief Complaint   Patient presents with    Shortness of Breath          HPI:     Leonardo Medina is a 65 y.o.  female who has history of chronic back pain, cerebellar ataxia, paroxysmal A. fib and depression presents to the emergency room with complaints of shortness of breath for 1 week. Shaking chills and general malaise . She has had several visits  to the emergency room troponins were done EKGs were done which showed no significant abnormality. Today she had a brief episode of tachycardia and atrial flutter that lasted less than 2 minutes associated with her dyspnea. Due to her coming to the emergency room 3 times and being on chronic amiodarone with malaise  if is felt  she should be admitted for further testing. Patient is mostly wheel chair bound from her cerebellar ataxia and has not had any falls in the past 6 months.      Past Medical History:   Diagnosis Date    Arthritis     Chronic pain     lower back    GERD (gastroesophageal reflux disease)     Ill-defined condition     Gita cerebella ataxia    Menopause     Age 47    Other ill-defined conditions(799.89)     Gita's Cerebellar Ataxia    Other ill-defined conditions(799.89)     Pessary    Psychiatric disorder     depression       Past Surgical History:   Procedure Laterality Date    HX CERVICAL FUSION  2012    anterior    HX GI      prolapse rectum    HX HEENT      OS muscle surgery    HX ORTHOPAEDIC      Right knee ORIF    HX ORTHOPAEDIC  2014    right total hip    HX OTHER SURGICAL      repair rectal prolapse       Family History   Problem Relation Age of Onset    Breast Cancer Maternal Aunt        Social History     Socioeconomic History    Marital status:    Tobacco Use    Smoking status: Never    Smokeless tobacco: Never   Substance and Sexual Activity    Alcohol use: Yes     Alcohol/week: 0.8 standard drinks     Types: 1 Glasses of wine per week    Drug use: No       Prior to Admission medications    Medication Sig Start Date End Date Taking? Authorizing Provider   amiodarone (CORDARONE) 200 mg tablet Take 1 Tablet by mouth daily. 9/7/22   Judie Clark MD   gabapentin (NEURONTIN) 400 mg capsule Take 1 Capsule by mouth nightly. Max Daily Amount: 400 mg. 9/1/22   Judie Clark MD   famotidine (PEPCID) 20 mg tablet Take 1 Tab by mouth two (2) times daily as needed for Pain or Nausea for up to 30 doses. 1/8/20   Priya Recinos MD   polyethylene glycol (MIRALAX) 17 gram/dose powder Take 17 g by mouth daily. 1 tablespoon with 8 oz of water daily 1/8/20   Priya Recinos MD   CALCIUM CARBONATE/VITAMIN D3 (CALCIUM 500 + D PO) Take 1 Tab by mouth two (2) times a day. Provider, Historical   MULTIVIT-MIN/IRON/FOLIC/LUTEIN (CENTRUM SILVER WOMEN PO) Take  by mouth daily. Provider, Historical   PARoxetine (PAXIL) 20 mg tablet Take 20 mg by mouth daily. Provider, Historical   ascorbic acid (VITAMIN C) 500 mg tablet Take 500 mg by mouth daily. Provider, Historical   cholecalciferol (VITAMIN D3) 1,000 unit tablet Take 1,000 Units by mouth daily. Provider, Historical   omega-3 fatty acids-vitamin e (FISH OIL) 1,000 mg Cap Take 1 Cap by mouth daily. Provider, Historical       No Known Allergies      Review of Systems  GENERAL: Patient alert, awake and oriented times 3, able to communicate full sentences and not in distress. HEENT: No change in vision, no earache, tinnitus, sore throat or sinus congestion. NECK: No pain or stiffness. PULMONARY: + shortness of breath, cough or wheeze. Cardiovascular: no pnd or orthopnea, +CP  GASTROINTESTINAL: No abdominal pain, nausea, vomiting or diarrhea, melena or bright red blood per rectum. GENITOURINARY: +urinary frequency, urgency, hesitancy or dysuria.    MUSCULOSKELETAL: No joint or muscle pain, no back pain, no recent trauma. DERMATOLOGIC: No rash, no itching, no lesions. ENDOCRINE: No polyuria, polydipsia, no heat or cold intolerance. No recent change in weight. HEMATOLOGICAL: No anemia or easy bruising or bleeding. NEUROLOGIC: No headache, seizures, numbness, tingling or weakness. Physical Exam:     Physical Exam:  Visit Vitals  BP (!) 104/41   Pulse 61   Temp 97.3 °F (36.3 °C)   Resp 17   Ht 4' 10\" (1.473 m)   Wt 49.9 kg (110 lb)   SpO2 95%   BMI 22.99 kg/m²      O2 Device: None (Room air)    Temp (24hrs), Av.3 °F (36.3 °C), Min:97.3 °F (36.3 °C), Max:97.3 °F (36.3 °C)    No intake/output data recorded. No intake/output data recorded. General:  Alert, cooperative, no distress, appears stated age. Head: Normocephalic, without obvious abnormality, atraumatic. Eyes:  Conjunctivae/corneas clear. PERRL, EOMs intact. Nose: Nares normal. No drainage or sinus tenderness. Neck: Supple, symmetrical, trachea midline, no adenopathy, thyroid: no enlargement, no carotid bruit and no JVD. Lungs:   Clear to auscultation bilaterally. Heart:  Regular rate and rhythm, S1, S2 normal.  Irregularly irregular with tachycardia intervention     Abdomen: Soft, non-tender. Bowel sounds normal.    Extremities: Extremities normal, atraumatic, no cyanosis or edema. Trace edema   Pulses: 2+ and symmetric all extremities. Skin:  No rashes or lesions   Neurologic: AAOx3, No focal motor or sensory deficit. Labs Reviewed: All lab results for the last 24 hours reviewed. and EKG    Procedures/imaging: see electronic medical records for all procedures/Xrays and details which were not copied into this note but were reviewed prior to creation of Plan      Assessment/Plan     Active Problems:    * No active hospital problems. *  1. Dyspnea on exertion  Rate control PAF with amiodarone  Check TSH  Trend cardiac enzymes with echo    2. UTI.   Check urine cultures start Rocephin every 24  Also obtain blood cultures    3. Cerebellar ataxia  Renew her gabapentin and Zanaflex PT OT consult  Wheel chair bound     4. Diabetes  No concentrated sweet diet sliding scale insulin with mealtime insulin and long-acting insulin if needed    4.   Paroxysmal A. fib D  Rate controlled with amiodarone  Previously was on digoxin   D/c but asked to bring in meds   Also check level to verify not taking     VT/GI Prophylaxis: Hep SQ        Alina Lopez MD  1/9/2023 1:21 AM

## 2023-01-09 NOTE — ED TRIAGE NOTES
Patient ambulatory to ED c/o SOB x 1 week     Patient seen here yesterday and was discharged home     Patient able to speak in full sentences, NAD att    Hx a-fib

## 2023-01-09 NOTE — ED PROVIDER NOTES
EMERGENCY DEPARTMENT HISTORY AND PHYSICAL EXAM    Date: 1/8/2023  Patient Name: Anthony Scales    History of Presenting Illness     Chief Complaint   Patient presents with    Shortness of Breath         History Provided By: Patient and her     Additional History (Context):   12:40 AM  Anthony Scales is a 65 y.o. female with PMHX of paroxysmal atrial fibrillation, recurrent respiratory difficulty, Gita cerebellar ataxia, chronic low back pain reflux, who presents to the emergency department C/O of occultly breathing. This is the third night in a row, Mrs Anthony Scales has come to see us. Her chief complaint is respiratory difficulty although she has been having variable palpitations which seem to wax and wane. Over the last couple of nights she has been giving variable medications and treatment including DuoNeb the other day. When I listen to her yesterday she had subtle crackles in her lung but no visible abnormalities within the x-ray findings. I ordered her Lasix however she had increased agitation and discomfort. She was given reflux medications in addition to Ativan which clearly was helpful. She has had various bouts of palpitations associated with this over the last couple of days. She is already taken amiodarone for rate control. Denies any fevers chills cough or productive sputum. She denies any nausea vomiting. She denies any diarrhea. She has been compliant with her medications    Social History  Denies smoking drinking or drugs    Family History  No specific family history. PCP: Jose Ramon Beckham MD    Current Outpatient Medications   Medication Sig Dispense Refill    amiodarone (CORDARONE) 200 mg tablet Take 1 Tablet by mouth daily. 30 Tablet 0    gabapentin (NEURONTIN) 400 mg capsule Take 1 Capsule by mouth nightly.  Max Daily Amount: 400 mg. 10 Capsule 0    famotidine (PEPCID) 20 mg tablet Take 1 Tab by mouth two (2) times daily as needed for Pain or Nausea for up to 30 doses. 30 Tab 0    polyethylene glycol (MIRALAX) 17 gram/dose powder Take 17 g by mouth daily. 1 tablespoon with 8 oz of water daily 300 g 0    CALCIUM CARBONATE/VITAMIN D3 (CALCIUM 500 + D PO) Take 1 Tab by mouth two (2) times a day. MULTIVIT-MIN/IRON/FOLIC/LUTEIN (CENTRUM SILVER WOMEN PO) Take  by mouth daily. PARoxetine (PAXIL) 20 mg tablet Take 20 mg by mouth daily. ascorbic acid (VITAMIN C) 500 mg tablet Take 500 mg by mouth daily. cholecalciferol (VITAMIN D3) 1,000 unit tablet Take 1,000 Units by mouth daily. omega-3 fatty acids-vitamin e (FISH OIL) 1,000 mg Cap Take 1 Cap by mouth daily. Past History     Past Medical History:  Past Medical History:   Diagnosis Date    Arthritis     Chronic pain     lower back    GERD (gastroesophageal reflux disease)     Ill-defined condition     Gita cerebella ataxia    Menopause     Age 47    Other ill-defined conditions(799.89)     Gita's Cerebellar Ataxia    Other ill-defined conditions(799.89)     Pessary    Psychiatric disorder     depression       Past Surgical History:  Past Surgical History:   Procedure Laterality Date    HX CERVICAL FUSION  2012    anterior    HX GI      prolapse rectum    HX HEENT      OS muscle surgery    HX ORTHOPAEDIC      Right knee ORIF    HX ORTHOPAEDIC  2014    right total hip    HX OTHER SURGICAL      repair rectal prolapse       Family History:  Family History   Problem Relation Age of Onset    Breast Cancer Maternal Aunt        Social History:  Social History     Tobacco Use    Smoking status: Never    Smokeless tobacco: Never   Substance Use Topics    Alcohol use: Yes     Alcohol/week: 0.8 standard drinks     Types: 1 Glasses of wine per week    Drug use: No       Allergies:  No Known Allergies      Review of Systems   Review of Systems   Respiratory:  Positive for shortness of breath. Cardiovascular:  Positive for palpitations. Psychiatric/Behavioral:  The patient is nervous/anxious. Physical Exam     Vitals:    01/08/23 2143 01/08/23 2158 01/08/23 2313 01/08/23 2333   BP: (!) 127/57 (!) 121/59 (!) 128/55 (!) 122/49   Pulse: 64 62 66 63   Resp: 13 11 19 12   Temp:       SpO2: 93% 98% 100% 96%   Weight:       Height:         Physical Exam  Vitals and nursing note reviewed. Constitutional:       General: She is not in acute distress. Appearance: She is well-developed. She is not diaphoretic. Comments: Elderly lady frail appearing nontoxic   HENT:      Head: Normocephalic and atraumatic. Eyes:      General: No scleral icterus. Extraocular Movements:      Right eye: Normal extraocular motion. Left eye: Normal extraocular motion. Conjunctiva/sclera: Conjunctivae normal.      Pupils: Pupils are equal, round, and reactive to light. Neck:      Trachea: No tracheal deviation. Cardiovascular:      Rate and Rhythm: Normal rate and regular rhythm. Heart sounds: Normal heart sounds. Pulmonary:      Effort: Pulmonary effort is normal. No respiratory distress. Breath sounds: Normal breath sounds. No stridor. Abdominal:      General: Bowel sounds are normal. There is no distension. Palpations: Abdomen is soft. Tenderness: There is no abdominal tenderness. There is no rebound. Musculoskeletal:         General: No tenderness. Normal range of motion. Cervical back: Normal range of motion and neck supple. Comments: Grossly unremarkable without abnormalities   Skin:     General: Skin is warm and dry. Capillary Refill: Capillary refill takes less than 2 seconds. Findings: No erythema or rash. Neurological:      Mental Status: She is alert and oriented to person, place, and time. GCS: GCS eye subscore is 4. GCS verbal subscore is 5. GCS motor subscore is 6. Cranial Nerves: No cranial nerve deficit. Motor: No weakness.    Psychiatric:         Attention and Perception: Attention normal.         Mood and Affect: Mood is anxious. Speech: Speech normal.         Behavior: Behavior normal.         Thought Content: Thought content normal.         Judgment: Judgment normal.     Diagnostic Study Results     Labs -  Recent Results (from the past 24 hour(s))   EKG, 12 LEAD, INITIAL    Collection Time: 01/08/23  2:10 AM   Result Value Ref Range    Ventricular Rate 59 BPM    Atrial Rate 59 BPM    P-R Interval 188 ms    QRS Duration 98 ms    Q-T Interval 446 ms    QTC Calculation (Bezet) 441 ms    Calculated P Axis 81 degrees    Calculated R Axis 11 degrees    Calculated T Axis 46 degrees    Diagnosis       Sinus bradycardia  Low voltage QRS  Incomplete right bundle branch block  Cannot rule out Anterior infarct (cited on or before 06-JAN-2023)  Abnormal ECG  When compared with ECG of 06-JAN-2023 04:23,  Significant changes have occurred     CBC WITH AUTOMATED DIFF    Collection Time: 01/08/23  2:25 AM   Result Value Ref Range    WBC 7.7 4.6 - 13.2 K/uL    RBC 3.90 (L) 4.20 - 5.30 M/uL    HGB 12.9 12.0 - 16.0 g/dL    HCT 39.3 35.0 - 45.0 %    .8 (H) 78.0 - 100.0 FL    MCH 33.1 24.0 - 34.0 PG    MCHC 32.8 31.0 - 37.0 g/dL    RDW 14.1 11.6 - 14.5 %    PLATELET 231 484 - 502 K/uL    MPV 10.8 9.2 - 11.8 FL    NRBC 0.0 0  WBC    ABSOLUTE NRBC 0.00 0.00 - 0.01 K/uL    NEUTROPHILS 76 (H) 40 - 73 %    LYMPHOCYTES 12 (L) 21 - 52 %    MONOCYTES 10 3 - 10 %    EOSINOPHILS 1 0 - 5 %    BASOPHILS 1 0 - 2 %    IMMATURE GRANULOCYTES 1 (H) 0.0 - 0.5 %    ABS. NEUTROPHILS 5.8 1.8 - 8.0 K/UL    ABS. LYMPHOCYTES 0.9 0.9 - 3.6 K/UL    ABS. MONOCYTES 0.8 0.05 - 1.2 K/UL    ABS. EOSINOPHILS 0.1 0.0 - 0.4 K/UL    ABS. BASOPHILS 0.1 0.0 - 0.1 K/UL    ABS. IMM.  GRANS. 0.1 (H) 0.00 - 0.04 K/UL    DF AUTOMATED     METABOLIC PANEL, BASIC    Collection Time: 01/08/23  2:25 AM   Result Value Ref Range    Sodium 139 136 - 145 mmol/L    Potassium 4.1 3.5 - 5.5 mmol/L    Chloride 106 100 - 111 mmol/L    CO2 25 21 - 32 mmol/L    Anion gap 8 3.0 - 18 mmol/L    Glucose 116 (H) 74 - 99 mg/dL    BUN 24 (H) 7.0 - 18 MG/DL    Creatinine 0.96 0.6 - 1.3 MG/DL    BUN/Creatinine ratio 25 (H) 12 - 20      eGFR >60 >60 ml/min/1.73m2    Calcium 9.3 8.5 - 10.1 MG/DL   MAGNESIUM    Collection Time: 01/08/23  2:25 AM   Result Value Ref Range    Magnesium 2.1 1.6 - 2.6 mg/dL   TROPONIN-HIGH SENSITIVITY    Collection Time: 01/08/23  2:25 AM   Result Value Ref Range    Troponin-High Sensitivity 5 0 - 54 ng/L   NT-PRO BNP    Collection Time: 01/08/23  2:25 AM   Result Value Ref Range    NT pro- 0 - 1,800 PG/ML   EKG, 12 LEAD, INITIAL    Collection Time: 01/08/23  8:23 PM   Result Value Ref Range    Ventricular Rate 60 BPM    Atrial Rate 60 BPM    P-R Interval 156 ms    QRS Duration 98 ms    Q-T Interval 458 ms    QTC Calculation (Bezet) 458 ms    Calculated P Axis 40 degrees    Calculated R Axis -7 degrees    Calculated T Axis 36 degrees    Diagnosis       Normal sinus rhythm  Possible Anterior infarct (cited on or before 06-JAN-2023)  Abnormal ECG  When compared with ECG of 08-JAN-2023 02:10,  Incomplete right bundle branch block is no longer present     CBC WITH AUTOMATED DIFF    Collection Time: 01/08/23  9:49 PM   Result Value Ref Range    WBC 7.1 4.6 - 13.2 K/uL    RBC 4.14 (L) 4.20 - 5.30 M/uL    HGB 13.9 12.0 - 16.0 g/dL    HCT 42.2 35.0 - 45.0 %    .9 (H) 78.0 - 100.0 FL    MCH 33.6 24.0 - 34.0 PG    MCHC 32.9 31.0 - 37.0 g/dL    RDW 14.2 11.6 - 14.5 %    PLATELET 976 188 - 480 K/uL    MPV 10.4 9.2 - 11.8 FL    NRBC 0.0 0  WBC    ABSOLUTE NRBC 0.00 0.00 - 0.01 K/uL    NEUTROPHILS 75 (H) 40 - 73 %    LYMPHOCYTES 12 (L) 21 - 52 %    MONOCYTES 11 (H) 3 - 10 %    EOSINOPHILS 1 0 - 5 %    BASOPHILS 0 0 - 2 %    IMMATURE GRANULOCYTES 1 (H) 0.0 - 0.5 %    ABS. NEUTROPHILS 5.3 1.8 - 8.0 K/UL    ABS. LYMPHOCYTES 0.9 0.9 - 3.6 K/UL    ABS. MONOCYTES 0.8 0.05 - 1.2 K/UL    ABS. EOSINOPHILS 0.1 0.0 - 0.4 K/UL    ABS. BASOPHILS 0.0 0.0 - 0.1 K/UL    ABS. IMM. GRANS. 0.0 0.00 - 0.04 K/UL    DF AUTOMATED     METABOLIC PANEL, COMPREHENSIVE    Collection Time: 01/08/23  9:49 PM   Result Value Ref Range    Sodium 138 136 - 145 mmol/L    Potassium 3.9 3.5 - 5.5 mmol/L    Chloride 102 100 - 111 mmol/L    CO2 30 21 - 32 mmol/L    Anion gap 6 3.0 - 18 mmol/L    Glucose 108 (H) 74 - 99 mg/dL    BUN 25 (H) 7.0 - 18 MG/DL    Creatinine 0.92 0.6 - 1.3 MG/DL    BUN/Creatinine ratio 27 (H) 12 - 20      eGFR >60 >60 ml/min/1.73m2    Calcium 10.1 8.5 - 10.1 MG/DL    Bilirubin, total 0.8 0.2 - 1.0 MG/DL    ALT (SGPT) 35 13 - 56 U/L    AST (SGOT) 24 10 - 38 U/L    Alk. phosphatase 87 45 - 117 U/L    Protein, total 7.8 6.4 - 8.2 g/dL    Albumin 4.4 3.4 - 5.0 g/dL    Globulin 3.4 2.0 - 4.0 g/dL    A-G Ratio 1.3 0.8 - 1.7     TROPONIN-HIGH SENSITIVITY    Collection Time: 01/08/23  9:49 PM   Result Value Ref Range    Troponin-High Sensitivity 5 0 - 54 ng/L   MAGNESIUM    Collection Time: 01/08/23  9:49 PM   Result Value Ref Range    Magnesium 2.3 1.6 - 2.6 mg/dL   URINALYSIS W/ RFLX MICROSCOPIC    Collection Time: 01/08/23  9:50 PM   Result Value Ref Range    Color YELLOW      Appearance CLEAR      Specific gravity >1.030 (H) 1.003 - 1.030    pH (UA) 5.0 5.0 - 8.0      Protein TRACE (A) NEG mg/dL    Glucose Negative NEG mg/dL    Ketone TRACE (A) NEG mg/dL    Bilirubin Negative NEG      Blood Negative NEG      Urobilinogen 1.0 0.2 - 1.0 EU/dL    Nitrites Negative NEG      Leukocyte Esterase MODERATE (A) NEG     URINE MICROSCOPIC ONLY    Collection Time: 01/08/23  9:50 PM   Result Value Ref Range    WBC 10 to 20 0 - 5 /hpf    RBC Negative 0 - 5 /hpf    Epithelial cells 1+ 0 - 5 /lpf    Bacteria 2+ (A) NEG /hpf    Uric acid crystals 1+ (A) NEG        Radiologic Studies -   Preliminary reading  Portable chest x-ray  No cardiomegaly or abnormalities. No pleural effusion. Final radiology report pending  Skip Cardoso MD    XR CHEST PORT   Final Result      No active cardiopulmonary disease. CT Results  (Last 48 hours)      None          CXR Results  (Last 48 hours)                 01/08/23 2221  XR CHEST PORT Final result    Impression:      No active cardiopulmonary disease. Narrative:  EXAM: CHEST RADIOGRAPH, SINGLE VIEW       CLINICAL INDICATION/HISTORY: Cough, shortness of breath, paroxysmal A fib       COMPARISON: Same day at 4:04 AM       TECHNIQUE: Portable frontal view of the chest was obtained at 10:15 PM.        _______________       FINDINGS:       SUPPORT DEVICES: None. HEART AND MEDIASTINUM: Cardiomediastinal silhouette appears within normal   limits. Normal caliber thoracic aorta. No central vascular congestion. LUNGS AND PLEURAL SPACES: Lungs are hyperinflated with no confluent airspace   opacity. No pleural effusion or pneumothorax. BONY THORAX AND SOFT TISSUES: No acute osseous abnormality. 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 _______________           01/08/23 0410  XR CHEST PORT Final result    Impression:      Right basilar parenchymal opacities represent either subsegmental atelectasis or   developing pneumonia. Narrative:  EXAM: One view chest x-ray       CLINICAL INDICATION/HISTORY: Dyspnea. COMPARISON: 01/06/2023. TECHNIQUE: Single AP view of the chest was obtained.       _______________       FINDINGS:       HEART, VESSELS, MEDIASTINUM: Heart size is normal. No vascular congestion. There   is atherosclerosis of the thoracic aorta. LUNGS, PLEURAL SPACES: Hazy parenchymal opacities in the right lung base. No   effusion or pneumothorax. BONY THORAX, SOFT TISSUES: There is bilateral shoulder arthropathy. _______________                   Medications given in the ED-  Medications - No data to display      Medical Decision Making   I am the first provider for this patient.     I reviewed the vital signs, available nursing notes, past medical history, past surgical history, family history and social history. Vital Signs-Reviewed the patient's vital signs. Pulse Oximetry Analysis -96% on room air    Cardiac Monitor:  Rate: 87 bpm  Rhythm: Sinus rhythm    EKG interpretation: (Preliminary)  8:23 PM    Normal sinus rhythm, rate 60, nonspecific ST changes, there is minimal motion artifact on this image. QTC is 458. EKG read by Kristi Serrano MD      Records Reviewed: NURSING NOTES AND PREVIOUS MEDICAL RECORDS    Provider Notes (Medical Decision Making): This is an acute condition which appears to be escalating in severity. The last 2 nights have agreed this is mild but now has become more of a moderate to severe situation although there is no immediate intervention required. She has no crackles on examination this time. Patient presents with dyspnea after having multiple visits for breathing difficulties associated with paroxysmal A. fib. Yesterday she had crackles in her lungs but x-rays were clear. She is also had very short runs of palpitations. I believe she strongly has a anxiety component however she clearly describes various runs of palpitations over the last 3 days. It may be that she is got paroxysmal A. fib which is making things difficult to monitor. This is her third night visiting for the same complaint. Blood work was sent chest x-ray looks to be reassuring. Due to the consistency of her symptoms I did asked the hospitalist to help us for overnight period of observation followed by cardiology visit to consider echocardiogram further cardiac monitoring for paroxysmal A. fib. Is possible this is CHF or simply atelectasis from her not taking deep breaths responsible for the crackles I heard in her lungs yesterday. They are not present today. Procedures:  Procedures    ED Course:   12:40 AM: Initial assessment performed. The patients presenting problems have been discussed, and they are in agreement with the care plan formulated and outlined with them.   I have encouraged them to ask questions as they arise throughout their visit. CONSULT NOTE:   1:12 AM  Janeen Mathur MD   spoke with Dr. Ophelia Stafford  Specialty: Hospitalist  Discussed pt's hx, disposition, and available diagnostic and imaging results  over the telephone. Reviewed care plans. Consulting physician agrees with plans as outlined. Agrees with decision for admission evaluation palpitations paroxysmal A. fib. As we perform CTA prior to transport upstairs. .        Diagnosis and Disposition     Critical Care Time: 0 minutes    Core Measures:  For Hospitalized Patients:    1. Hospitalization Decision Time:  The decision to hospitalize the patient was made by Janeen Mathur MD   at 10:15 PM on 1/8/2023    2. Aspirin: Aspirin was not given because the patient did not present with a stroke at the time of their Emergency Department evaluation    1:38 AM patient is being admitted to the hospital by Dr. Ophelia Stafford. The results of their tests and reasons for their admission have been discussed with them and/or available family. They convey agreement and understanding for the need to be admitted and for their admission diagnosis. CONDITIONS ON ADMISSION:  Sepsis is not present at the time of admission. Deep Vein Thrombosis is not present at the time of admission. Thrombosis is not present at the time of admission. Urinary Tract Infection is present at the time of admission. Pneumonia is not present at the time of admission. MRSA is not present at the time of admission. Wound infection is not present at the time of admission. Pressure Ulcer is not present at the time of admission. CLINICAL IMPRESSION:    1. Dyspnea, unspecified type    2. Paroxysmal atrial fibrillation (HCC)    3. Acute UTI          _______________________________    This note was partially transcribed via voice recognition software.  Although efforts have been made to catch any discrepancies, it may contain sound alike words, grammatical errors, or nonsensical words.

## 2023-01-09 NOTE — PROGRESS NOTES
Hospitalist Progress Note    Patient: Enoch Vincent MRN: [id number]  CSN: [id number]    YOB: 1945  Age: 65 y.o. Sex: female    DOA: 1/8/2023 LOS:  LOS: 0 days          Chief Complaint:    67-year-old female with history of cerebellar ataxia paroxysmal atrial fibs presents to the emergency room with shortness of breath and bouts of atrial tachycardia      Assessment/Plan     1. Paroxysmal A. Fib with associated dyspnea  Continue amiodarone  TSH  Check echo    Cardiac enzyme trended flat  Cardiology consult appreciated    2.  UTI  On Rocephin    3. Cerebellar ataxia  Continue gabapentin and Zanaflex PT and OT consulted    4. Diabetes  On Lantus and mealtime insulin oral agents held    5.   Abnormal TSH in December  Repeat pending consider low-dose replacement    Patient Active Problem List   Diagnosis Code    DDD (degenerative disc disease), cervical M50.30    OA (osteoarthritis) of hip M16.9    Gita's cerebellar ataxia ([address] G11.2    Acute blood loss anemia D62    DDD (degenerative disc disease), lumbar M51.36    Spondylolisthesis of lumbar region M43.16    Scoliosis of thoracolumbar spine M41.9    SIRS (systemic inflammatory response syndrome) ([address] R65.10    History of back surgery Z98.890    Atelectasis J98.11    New onset a-fib ([address] I48.91    Atrial fibrillation (HCC) I48.91    Dizziness R42    Ataxia R27.0    Constipated K59.00    Paroxysmal atrial fibrillation (HCC) I48.0    SOB (shortness of breath) R06.02       Subjective:     I am cold  Getting labs done    Review of systems:    Constitutional: denies fevers, chills, myalgias  Respiratory: +SOB, cough  Cardiovascular: denies chest pain, palpitations  Gastrointestinal: denies nausea, vomiting, diarrhea      Vital signs/Intake and Output:  Visit Vitals  BP (!) 116/49   Pulse 60   Temp 97.3 °F (36.3 °C)   Resp 19   Ht 4' 10\" (1.473 m)   Wt 49.9 kg (110 lb)   SpO2 91%   BMI 22.99 kg/m²     Current Shift:  No intake/output data recorded. Last three shifts:  No intake/output data recorded. Exam:    General: Well developed, alert, NAD, OX3  Head/Neck: NCAT, supple, No masses, No lymphadenopathy  CVS:Regular rate and rhythm, no M/R/G, S1/S2 heard, no thrill  Lungs:Clear to auscultation bilaterally, no wheezes, rhonchi, or rales  Abdomen: Soft, Nontender, No distention, Normal Bowel sounds, No hepatomegaly  Extremities: No C/C/E, pulses palpable 2+  Skin:normal texture and turgor, no rashes, no lesions  Neuro:grossly normal , follows commands  Psych:appropriate                Labs: Results:       Chemistry Recent Labs     01/08/23 2149 01/08/23 0225   * 116*    139   K 3.9 4.1    106   CO2 30 25   BUN 25* 24*   CREA 0.92 0.96   CA 10.1 9.3   AGAP 6 8   BUCR 27* 25*   AP 87  --    TP 7.8  --    ALB 4.4  --    GLOB 3.4  --    AGRAT 1.3  --       CBC w/Diff Recent Labs     01/08/23 2149 01/08/23 0225   WBC 7.1 7.7   RBC 4.14* 3.90*   HGB 13.9 12.9   HCT 42.2 39.3    271   GRANS 75* 76*   LYMPH 12* 12*   EOS 1 1      Cardiac Enzymes No results for input(s): CPK, CKND1, ELEONORA in the last 72 hours. No lab exists for component: CKRMB, TROIP   Coagulation No results for input(s): PTP, INR, APTT, INREXT in the last 72 hours. Lipid Panel No results found for: CHOL, CHOLPOCT, CHOLX, CHLST, CHOLV, 461500, HDL, HDLP, LDL, LDLC, DLDLP, 094240, VLDLC, VLDL, TGLX, TRIGL, TRIGP, TGLPOCT, CHHD, CHHDX   BNP No results for input(s): BNPP in the last 72 hours.    Liver Enzymes Recent Labs     01/08/23 2149   TP 7.8   ALB 4.4   AP 87      Thyroid Studies Lab Results   Component Value Date/Time    TSH 1.31 08/26/2022 12:32 AM        Procedures/imaging: see electronic medical records for all procedures/Xrays and details which were not copied into this note but were reviewed prior to creation of Yanely Hill MD

## 2023-01-10 PROBLEM — E03.9 ACQUIRED HYPOTHYROIDISM: Status: ACTIVE | Noted: 2023-01-10

## 2023-01-10 LAB
ALBUMIN SERPL-MCNC: 3.5 G/DL (ref 3.4–5)
ALBUMIN/GLOB SERPL: 1.1 (ref 0.8–1.7)
ALP SERPL-CCNC: 76 U/L (ref 45–117)
ALT SERPL-CCNC: 31 U/L (ref 13–56)
ANION GAP SERPL CALC-SCNC: 4 MMOL/L (ref 3–18)
AST SERPL-CCNC: 22 U/L (ref 10–38)
BACTERIA SPEC CULT: NORMAL
BASOPHILS # BLD: 0 K/UL (ref 0–0.1)
BASOPHILS NFR BLD: 1 % (ref 0–2)
BILIRUB SERPL-MCNC: 0.5 MG/DL (ref 0.2–1)
BUN SERPL-MCNC: 24 MG/DL (ref 7–18)
BUN/CREAT SERPL: 34 (ref 12–20)
CALCIUM SERPL-MCNC: 9.1 MG/DL (ref 8.5–10.1)
CC UR VC: NORMAL
CHLORIDE SERPL-SCNC: 104 MMOL/L (ref 100–111)
CO2 SERPL-SCNC: 31 MMOL/L (ref 21–32)
CREAT SERPL-MCNC: 0.7 MG/DL (ref 0.6–1.3)
DIFFERENTIAL METHOD BLD: ABNORMAL
EOSINOPHIL # BLD: 0.1 K/UL (ref 0–0.4)
EOSINOPHIL NFR BLD: 2 % (ref 0–5)
ERYTHROCYTE [DISTWIDTH] IN BLOOD BY AUTOMATED COUNT: 14 % (ref 11.6–14.5)
GLOBULIN SER CALC-MCNC: 3.2 G/DL (ref 2–4)
GLUCOSE SERPL-MCNC: 106 MG/DL (ref 74–99)
HCT VFR BLD AUTO: 39.8 % (ref 35–45)
HGB BLD-MCNC: 13.1 G/DL (ref 12–16)
IMM GRANULOCYTES # BLD AUTO: 0 K/UL (ref 0–0.04)
IMM GRANULOCYTES NFR BLD AUTO: 1 % (ref 0–0.5)
LYMPHOCYTES # BLD: 1.1 K/UL (ref 0.9–3.6)
LYMPHOCYTES NFR BLD: 18 % (ref 21–52)
MAGNESIUM SERPL-MCNC: 2.2 MG/DL (ref 1.6–2.6)
MCH RBC QN AUTO: 33.2 PG (ref 24–34)
MCHC RBC AUTO-ENTMCNC: 32.9 G/DL (ref 31–37)
MCV RBC AUTO: 100.8 FL (ref 78–100)
MONOCYTES # BLD: 0.7 K/UL (ref 0.05–1.2)
MONOCYTES NFR BLD: 12 % (ref 3–10)
NEUTS SEG # BLD: 3.9 K/UL (ref 1.8–8)
NEUTS SEG NFR BLD: 67 % (ref 40–73)
NRBC # BLD: 0 K/UL (ref 0–0.01)
NRBC BLD-RTO: 0 PER 100 WBC
PLATELET # BLD AUTO: 284 K/UL (ref 135–420)
PMV BLD AUTO: 10.1 FL (ref 9.2–11.8)
POTASSIUM SERPL-SCNC: 4.2 MMOL/L (ref 3.5–5.5)
PROT SERPL-MCNC: 6.7 G/DL (ref 6.4–8.2)
RBC # BLD AUTO: 3.95 M/UL (ref 4.2–5.3)
SERVICE CMNT-IMP: NORMAL
SODIUM SERPL-SCNC: 139 MMOL/L (ref 136–145)
WBC # BLD AUTO: 5.9 K/UL (ref 4.6–13.2)

## 2023-01-10 PROCEDURE — 83735 ASSAY OF MAGNESIUM: CPT

## 2023-01-10 PROCEDURE — 36415 COLL VENOUS BLD VENIPUNCTURE: CPT

## 2023-01-10 PROCEDURE — 74011250637 HC RX REV CODE- 250/637: Performed by: INTERNAL MEDICINE

## 2023-01-10 PROCEDURE — 65270000046 HC RM TELEMETRY

## 2023-01-10 PROCEDURE — 80053 COMPREHEN METABOLIC PANEL: CPT

## 2023-01-10 PROCEDURE — 74011250636 HC RX REV CODE- 250/636: Performed by: EMERGENCY MEDICINE

## 2023-01-10 PROCEDURE — 74011000258 HC RX REV CODE- 258: Performed by: EMERGENCY MEDICINE

## 2023-01-10 PROCEDURE — 74011250637 HC RX REV CODE- 250/637: Performed by: HOSPITALIST

## 2023-01-10 PROCEDURE — 74011250636 HC RX REV CODE- 250/636: Performed by: INTERNAL MEDICINE

## 2023-01-10 PROCEDURE — 85025 COMPLETE CBC W/AUTO DIFF WBC: CPT

## 2023-01-10 RX ORDER — ENOXAPARIN SODIUM 100 MG/ML
30 INJECTION SUBCUTANEOUS DAILY
Status: DISCONTINUED | OUTPATIENT
Start: 2023-01-11 | End: 2023-01-11 | Stop reason: HOSPADM

## 2023-01-10 RX ADMIN — Medication 500 MG: at 08:34

## 2023-01-10 RX ADMIN — CEFTRIAXONE 2 G: 2 INJECTION, POWDER, FOR SOLUTION INTRAMUSCULAR; INTRAVENOUS at 01:04

## 2023-01-10 RX ADMIN — CALCIUM CARBONATE-VITAMIN D TAB 500 MG-200 UNIT 1 TABLET: 500-200 TAB at 08:34

## 2023-01-10 RX ADMIN — ENOXAPARIN SODIUM 40 MG: 100 INJECTION SUBCUTANEOUS at 08:34

## 2023-01-10 RX ADMIN — GABAPENTIN 400 MG: 400 CAPSULE ORAL at 21:11

## 2023-01-10 RX ADMIN — AMIODARONE HYDROCHLORIDE 200 MG: 200 TABLET ORAL at 08:34

## 2023-01-10 RX ADMIN — POLYETHYLENE GLYCOL 3350 17 G: 17 POWDER, FOR SOLUTION ORAL at 08:34

## 2023-01-10 RX ADMIN — LEVOTHYROXINE SODIUM 25 MCG: 0.03 TABLET ORAL at 06:07

## 2023-01-10 NOTE — PROGRESS NOTES
DC plan: Home with home health    Patient admitted from home. Patient lives with spouse and uses a wheelchair at home. Patient has a walker and a shower chair as well. Patients spouse able to assist upon D and has a daughter that lives close by as well. Patient stated that she no longer drives that her  assists with driving and taking patient to appointments. Patient stated that she is able to get meds as needed as an outpatient. Patient stated that she has an established PCP and is able to get meds as needed. Patient would like [address] upon dc, referral placed and FOC offered. Address confirmed. Family to transport patient home upon DC. SW to follow. Care Management Interventions  PCP Verified by CM: Yes  Mode of Transport at Discharge:  Other (see comment) (family )  Transition of Care Consult (CM Consult): [address]: Yes  MyChart Signup: Yes  Discharge Durable Medical Equipment: No  Health Maintenance Reviewed: Yes  Physical Therapy Consult: No  Occupational Therapy Consult: No  Speech Therapy Consult: No  Support Systems: Spouse/Significant Other  Confirm Follow Up Transport: Family  The Plan for Transition of Care is Related to the Following Treatment Goals : home with home health  The Patient and/or Patient Representative was Provided with a Choice of Provider and Agrees with the Discharge Plan?: Yes  Freedom of Choice List was Provided with Basic Dialogue that Supports the Patient's Individualized Plan of Care/Goals, Treatment Preferences and Shares the Quality Data Associated with the Providers?: Yes  Discharge Location  Patient Expects to be Discharged to[de-identified] Home with home health

## 2023-01-10 NOTE — PROGRESS NOTES
.    Pharmacist Review and Automatic Dose Adjustment of Prophylactic Enoxaparin    *Review reason for admission/hospital problem list*    The reviewing pharmacist has made an adjustment to the ordered enoxaparin dose or converted to UFH per the approved [facility] protocol and table as identified below. Bettina Juarez is a 65 y.o. female. No lab exists for component: CREATININE    Estimated Creatinine Clearance: 49.2 mL/min (based on SCr of 0.7 mg/dL).     Height:   Ht Readings from Last 1 Encounters:   01/09/23 147.3 cm (58\")     Weight:  Wt Readings from Last 1 Encounters:   01/09/23 49.9 kg (110 lb)               Plan: Based upon the patient's weight and renal function, the ordered enoxaparin dose of 40 mg SubQ every 24 hours has been changed/converted to enoxaparin 30 mg SubQ every 24 hours      Thank you,  Marek Schulte, [facility]

## 2023-01-10 NOTE — PROGRESS NOTES
1900 - 0730 : Patient remained medically stable throughout shift. No significant clinical changes noted. Bedside and Verbal shift change report given to [address]  (oncoming nurse) by Francesca Phelps (offgoing nurse). Report included the following information SBAR, Kardex, and MAR.

## 2023-01-10 NOTE — PROGRESS NOTES
Problem: Falls - Risk of  Goal: *Absence of Falls  Description: Document Ronaldo Daniel Fall Risk and appropriate interventions in the flowsheet.   Outcome: Progressing Towards Goal  Note: Fall Risk Interventions:  Mobility Interventions: Bed/chair exit alarm, Communicate number of staff needed for ambulation/transfer, Patient to call before getting OOB, PT Consult for mobility concerns, PT Consult for assist device competence, Strengthening exercises (ROM-active/passive), Utilize walker, cane, or other assistive device         Medication Interventions: Bed/chair exit alarm, Evaluate medications/consider consulting pharmacy, Patient to call before getting OOB, Teach patient to arise slowly    Elimination Interventions: Bed/chair exit alarm, Call light in reach, Elevated toilet seat, Patient to call for help with toileting needs, Stay With Me (per policy), Toilet paper/wipes in reach, Toileting schedule/hourly rounds              Problem: Patient Education: Go to Patient Education Activity  Goal: Patient/Family Education  Outcome: Progressing Towards Goal     Problem: Pain  Goal: *Control of Pain  Outcome: Progressing Towards Goal     Problem: Patient Education: Go to Patient Education Activity  Goal: Patient/Family Education  Outcome: Progressing Towards Goal     Problem: Afib Pathway: Day 1  Goal: Off Pathway (Use only if patient is Off Pathway)  Outcome: Progressing Towards Goal  Goal: Activity/Safety  Outcome: Progressing Towards Goal  Goal: Consults, if ordered  Outcome: Progressing Towards Goal  Goal: Diagnostic Test/Procedures  Outcome: Progressing Towards Goal  Goal: Nutrition/Diet  Outcome: Progressing Towards Goal  Goal: Discharge Planning  Outcome: Progressing Towards Goal  Goal: Medications  Outcome: Progressing Towards Goal  Goal: Respiratory  Outcome: Progressing Towards Goal  Goal: Treatments/Interventions/Procedures  Outcome: Progressing Towards Goal  Goal: Psychosocial  Outcome: Progressing Towards Goal  Goal: *Optimal pain control at patient's stated goal  Outcome: Progressing Towards Goal  Goal: *Hemodynamically stable  Outcome: Progressing Towards Goal  Goal: *Stable cardiac rhythm  Outcome: Progressing Towards Goal  Goal: *Lungs clear or at baseline  Outcome: Progressing Towards Goal  Goal: *Labs within defined limits  Outcome: Progressing Towards Goal  Goal: *Describes available resources and support systems  Outcome: Progressing Towards Goal     Problem: Afib Pathway: Day 2  Goal: Off Pathway (Use only if patient is Off Pathway)  Outcome: Progressing Towards Goal  Goal: Activity/Safety  Outcome: Progressing Towards Goal  Goal: Consults, if ordered  Outcome: Progressing Towards Goal  Goal: Diagnostic Test/Procedures  Outcome: Progressing Towards Goal  Goal: Nutrition/Diet  Outcome: Progressing Towards Goal  Goal: Discharge Planning  Outcome: Progressing Towards Goal  Goal: Medications  Outcome: Progressing Towards Goal  Goal: Respiratory  Outcome: Progressing Towards Goal  Goal: Treatments/Interventions/Procedures  Outcome: Progressing Towards Goal  Goal: Psychosocial  Outcome: Progressing Towards Goal  Goal: *Hemodynamically stable  Outcome: Progressing Towards Goal  Goal: *Optimal pain control at patient's stated goal  Outcome: Progressing Towards Goal  Goal: *Stable cardiac rhythm  Outcome: Progressing Towards Goal  Goal: *Lungs clear or at baseline  Outcome: Progressing Towards Goal  Goal: *Describes available resources and support systems  Outcome: Progressing Towards Goal     Problem: Afib Pathway: Day 3  Goal: Off Pathway (Use only if patient is Off Pathway)  Outcome: Progressing Towards Goal  Goal: Activity/Safety  Outcome: Progressing Towards Goal  Goal: Diagnostic Test/Procedures  Outcome: Progressing Towards Goal  Goal: Nutrition/Diet  Outcome: Progressing Towards Goal  Goal: Discharge Planning  Outcome: Progressing Towards Goal  Goal: Medications  Outcome: Progressing Towards Goal  Goal: Respiratory  Outcome: Progressing Towards Goal  Goal: Treatments/Interventions/Procedures  Outcome: Progressing Towards Goal  Goal: Psychosocial  Outcome: Progressing Towards Goal     Problem: Afib: Discharge Outcomes (not in CAT)  Goal: *Hemodynamically stable  Outcome: Progressing Towards Goal  Goal: *Stable cardiac rhythm  Outcome: Progressing Towards Goal  Goal: *Lungs clear or at baseline  Outcome: Progressing Towards Goal  Goal: *Optimal pain control at patient's stated goal  Outcome: Progressing Towards Goal  Goal: *Identifies cardiac risk factors  Outcome: Progressing Towards Goal  Goal: *Verbalizes home exercise program, activity guidelines, cardiac precautions  Outcome: Progressing Towards Goal  Goal: *Verbalizes understanding and describes prescribed diet  Outcome: Progressing Towards Goal  Goal: *Verbalizes understanding and describes medication purposes and frequencies  Outcome: Progressing Towards Goal  Goal: *Anxiety reduced or absent  Outcome: Progressing Towards Goal  Goal: *Understands and describes signs and symptoms to report to providers(Stroke Metric)  Outcome: Progressing Towards Goal  Goal: *Describes follow-up/return visits to physicians  Outcome: Progressing Towards Goal  Goal: *Describes available resources and support systems  Outcome: Progressing Towards Goal  Goal: *Influenza immunization  Outcome: Progressing Towards Goal  Goal: *Pneumococcal immunization  Outcome: Progressing Towards Goal  Goal: *Describes smoking cessation resources  Outcome: Progressing Towards Goal

## 2023-01-10 NOTE — ACP (ADVANCE CARE PLANNING)
Advance Care Planning   Advance Care Planning Inpatient Note  [facility] Department    Today's Date: 1/10/2023  Unit: THE [address] CARDIAC/MEDICAL    Received request from rounding. Upon review of chart and communication with care team, patient's decision making abilities are not in question. Patient was/were present in the room during visit. Goals of ACP Conversation:  Discuss Advance Care planning documents    Health Care Decision Makers:      Primary Decision Maker: Tyrell Villaseñor - Spouse - [phone]    Primary Decision Maker: Tracee Castañeda - Daughter - [phone]    Primary Decision Maker: Janak Villaseñor - Son    Summary:  Verified [facility]    Advance Care Planning Documents (Patient Wishes) on file:  Healthcare Power of /Advance Directive appointment of Health care agent  Living Will/ Advance Directive     Interventions:  Patient has ACP on file, and it is current with her wishes.     Care Preferences Communicated:  No    Outcomes/Plan:  Existing Advance Directive reviewed with patient; no changes to patient's previously recorded wishes    Chaplain Josefa on 1/10/2023 at 10:55 AM

## 2023-01-10 NOTE — PROGRESS NOTES
Reason for Admission:  SOB                     RUR Score:  14%                  Plan for utilizing home health: [address]     PCP: First and Last name:  Jose Ramon Beckham MD     Name of Practice:    Are you a current patient: Yes/No:    Approximate date of last visit:    Can you participate in a virtual visit with your PCP:                     Current Advanced Directive/Advance Care Plan: DNR      Healthcare Decision Maker:   Click here to complete [address] including selection of the Healthcare Decision Maker Relationship (ie \"Primary\")             Primary Decision Maker:  Tyrell Villaseñor - Spouse - [phone]    Primary Decision Maker: Ruth Ann Slater - Daughter - [phone]                  Transition of Care Plan:        Plan is to DC home with spouse

## 2023-01-10 NOTE — PROGRESS NOTES
Ryder Nieto is a 65y.o. year old female receiving Ceftriaxone for UTI. Height:   Ht Readings from Last 1 Encounters:   23 147.3 cm (58\")    Weight:   Wt Readings from Last 1 Encounters:   23 49.9 kg (110 lb)        Estimated Creatinine Clearance: 49.2 mL/min (based on SCr of 0.7 mg/dL).     Current Antimicrobial Therapy (168h ago, onward)       Ordered     Start Stop    01/10/23 1100  cefTRIAXone (ROCEPHIN) 1 g in 0.9% sodium chloride (MBP/ADV) 50 mL MBP  1 g,   IntraVENous,   EVERY 24 HOURS        References:    Lexicomp    23 0200 --                    Culture result:   Date Value Ref Range Status   2023 NO GROWTH 1 DAY   Preliminary   2023 NO GROWTH 1 DAY   Preliminary   2022 No growth (<1,000 CFU/ML)   Final   08/10/2022 NO GROWTH 6 DAYS   Final   08/10/2022 NO GROWTH 6 DAYS   Final   08/10/2022 No growth (<1,000 CFU/ML)   Final   2018 NO GROWTH 1 DAY   Final       Temp (24hrs), Av °F (36.7 °C), Min:97.9 °F (36.6 °C), Max:98.1 °F (36.7 °C)      Recent Labs     01/10/23  0144 23  2149 23  0225   WBC 5.9 7.1 7.7       DOSING CHART:    *May consider 2G IV dose for gram negative infections  Site and Type of Infection Dose and Route Frequency   Urinary Tract Infection 1G IV Q 24 hours   Bacteremia 1-2G IV* Q 24 hours   Upper Respiratory Tract Infection (if appropriate) 1G IV Q 24 hours   Skin and Soft Tissue Infection 1G IV Q 24 hours   Intra-abdominal Infection    (usually add metronidazole) 1G IV Q 24 hours   Gonorrhea                          (usually with Azithromycin or Doxycycline to cover Chlamydia) 250mg IM   1G (disseminated) Once  Q 24 hours (disseminated)   Endocarditis 2G IV Q 24 hours (may use q 12 hours with ampicillin for Enterococcus sp.)   Meningitis and CNS infections 2G IV Q 12 hours   Pneumonia 1G IV Q 24 hours   Osteomyelitis or prosthetic joint infections 2G IV  Q 24 hours       Patients excluded from an automatic dose change are as follows:  - Doses ordered by an infectious diseases physician. If appropriate, the pharmacist will call the physician about concerns. - Patients weighing > 100 kg- pharmacist may use their professional judgment for specific patient dosing  - Patient has symptoms consistent with, or diagnosis of meningitis, CNS infection, endocarditis or osteomyelitis. -  - Pediatric patients    Per Ileana Duran this patient's Ceftriaxone dose has been changed to 1 gm IV every 24 hours.     Fanny Olea, [facility]

## 2023-01-10 NOTE — ED PROVIDER NOTES
EMERGENCY DEPARTMENT HISTORY AND PHYSICAL EXAM    Date: 1/8/2023  Patient Name: China Sykes    History of Presenting Illness     Chief Complaint   Patient presents with    Shortness of Breath         History Provided By: Patient and Patient's     Additional History (Context):   3:51 AM  China Sykes is a 65 y.o. female with PMHX of migraines cerebellar ataxia, paroxysmal A. fib who presents to the emergency department C/O difficulty breathing. Patient states she woke in middle night felt like she could not breathe. She has a globus sensation in her chest.  Has been states she had a cough she was seen here last night for the same thing had evaluation multiple EKGs chest x-ray flu and COVID test troponin all came back unremarkable.     Social History  No smoking drinking or drugs    Family History  Aunt with breast cancer      PCP: Mitzy Mcknight MD    Facility-Administered Medications Ordered in Other Encounters   Medication Dose Route Frequency Provider Last Rate Last Admin    cefTRIAXone (ROCEPHIN) 2 g in 0.9% sodium chloride (MBP/ADV) 50 mL MBP  2 g IntraVENous Q24H Edson Jackman MD   IV Completed at 01/09/23 3804    amiodarone (CORDARONE) tablet 200 mg  200 mg Oral DAILY Xena Milian MD        calcium-vitamin D (OS-POONAM +D3) 500 mg-200 unit per tablet 1 Tablet  1 Tablet Oral DAILY Xena Milian MD   1 Tablet at 01/09/23 2164    ascorbic acid (vitamin C) (VITAMIN C) tablet 500 mg  500 mg Oral DAILY Xena Milian MD   500 mg at 01/09/23 3491    gabapentin (NEURONTIN) capsule 400 mg  400 mg Oral QHS Xena Milian MD   400 mg at 01/09/23 0341    polyethylene glycol (MIRALAX) packet 17 g  17 g Oral DAILY Xena Milian MD   17 g at 01/09/23 0838    sodium chloride (NS) flush 5-40 mL  5-40 mL IntraVENous Q8H Xena Milian MD   10 mL at 01/09/23 1400    sodium chloride (NS) flush 5-40 mL  5-40 mL IntraVENous PRN Xena Milian MD acetaminophen (TYLENOL) tablet 650 mg  650 mg Oral Q6H PRN Tuan Milian MD        Or    acetaminophen (TYLENOL) suppository 650 mg  650 mg Rectal Q6H PRN Tuan Milian MD        polyethylene glycol (MIRALAX) packet 17 g  17 g Oral DAILY PRN Tuan Milian MD        ondansetron (ZOFRAN ODT) tablet 4 mg  4 mg Oral Q8H PRN Tuan Milian MD        Or    ondansetron (ZOFRAN) injection 4 mg  4 mg IntraVENous Q6H PRN Tuan Milian MD        enoxaparin (LOVENOX) injection 40 mg  40 mg SubCUTAneous DAILY Tuan Milian MD   40 mg at 01/09/23 0849    levothyroxine (SYNTHROID) tablet 25 mcg  25 mcg Oral Tricia Angeles MD   25 mcg at 01/09/23 5017       Past History     Past Medical History:  Past Medical History:   Diagnosis Date    Arthritis     Chronic pain     lower back    GERD (gastroesophageal reflux disease)     Ill-defined condition     Gita cerebella ataxia    Menopause     Age 47    Other ill-defined conditions(799.89)     Gita's Cerebellar Ataxia    Other ill-defined conditions(799.89)     Pessary    Psychiatric disorder     depression       Past Surgical History:  Past Surgical History:   Procedure Laterality Date    HX CERVICAL FUSION  2012    anterior    HX GI      prolapse rectum    HX HEENT      OS muscle surgery    HX ORTHOPAEDIC      Right knee ORIF    HX ORTHOPAEDIC  2014    right total hip    HX OTHER SURGICAL      repair rectal prolapse       Family History:  Family History   Problem Relation Age of Onset    Breast Cancer Maternal Aunt        Social History:  Social History     Tobacco Use    Smoking status: Never    Smokeless tobacco: Never   Substance Use Topics    Alcohol use: Yes     Alcohol/week: 0.8 standard drinks     Types: 1 Glasses of wine per week    Drug use: No       Allergies:  No Known Allergies      Review of Systems   Review of Systems   Respiratory:  Positive for shortness of breath.          \"I cannot breathe\"     Physical Exam Vitals:    01/08/23 0548 01/08/23 0618 01/08/23 0636 01/08/23 0641   BP: (!) 106/55   (!) 106/57   Pulse: 80 60 (!) 59 62   Resp: 22 15 14 19   Temp:       SpO2: 95% 100% 100% 95%   Weight:       Height:         Physical Exam  Vitals and nursing note reviewed. Constitutional:       General: She is not in acute distress. Appearance: She is well-developed. She is not diaphoretic. Comments: Alert elderly female frail appearing and anxious   HENT:      Head: Normocephalic and atraumatic. Eyes:      General: No scleral icterus. Extraocular Movements:      Right eye: Normal extraocular motion. Left eye: Normal extraocular motion. Conjunctiva/sclera: Conjunctivae normal.      Pupils: Pupils are equal, round, and reactive to light. Neck:      Trachea: No tracheal deviation. Cardiovascular:      Rate and Rhythm: Normal rate and regular rhythm. Heart sounds: Normal heart sounds. Pulmonary:      Effort: Pulmonary effort is normal. No respiratory distress. Breath sounds: No stridor. Examination of the right-middle field reveals rales. Rales present. Abdominal:      General: Bowel sounds are normal. There is no distension. Palpations: Abdomen is soft. Tenderness: There is no abdominal tenderness. There is no rebound. Musculoskeletal:         General: No tenderness. Normal range of motion. Cervical back: Normal range of motion and neck supple. Comments: Grossly unremarkable without abnormalities   Skin:     General: Skin is warm and dry. Capillary Refill: Capillary refill takes less than 2 seconds. Findings: No erythema or rash. Neurological:      Mental Status: She is alert and oriented to person, place, and time. GCS: GCS eye subscore is 4. GCS verbal subscore is 5. GCS motor subscore is 6. Cranial Nerves: No cranial nerve deficit. Motor: No weakness. Coordination: Coordination is intact.    Psychiatric:         Attention and Perception: Attention normal.         Mood and Affect: Mood is anxious. Speech: Speech normal.         Behavior: Behavior normal.         Thought Content:  Thought content normal.         Judgment: Judgment normal.       Diagnostic Study Results     Labs -  Lab Results         Contains abnormal data CBC WITH AUTOMATED DIFF (Final result)   Component (Lab Inquiry)  Collection Time Result Time WBC RBC HGB HCT MCV MCH MCHC RDW PLT MPLV   01/08/23 02:25:00 01/08/23 04:39:40 7.7 3.90 Low  12.9 39.3 100.8 High  33.1 32.8 14.1 271 10.8   Previous Results   01/06/23 02:00:00 01/06/23 02:13:37 6.8 3.97 Low  13.1 39.7 100.0 33.0 33.0 13.8 261 9.8   08/30/22 02:25:00 08/30/22 04:07:07 7.1 3.91 Low  12.8 39.8 101.8 High  32.7 32.2 12.6 234 10.7   08/29/22 01:45:00 08/29/22 03:10:20 5.6 3.25 Low  10.8 Low  34.0 Low  104.6 High  33.2 31.8 12.6 208 10.7   08/28/22 01:46:00 08/28/22 01:55:18 5.8 3.17 Low  10.4 Low  33.0 Low  104.1 High  32.8 31.5 12.5 196 10.1   08/27/22 06:40:00 08/27/22 08:07:06 4.9 3.58 Low  11.7 Low  36.7 102.5 High  32.7 31.9 12.6 236 9.8       Collection Time Result Time NRBC ANRBC GRANS LYMPH MONOS EOS BASOS IG ABG ABL   01/08/23 02:25:00 01/08/23 04:39:40 0.0 0.00 76 High  12 Low  10 1 1 1 High  5.8 0.9   Previous Results   01/06/23 02:00:00 01/06/23 02:13:37 0.0 0.00 69 17 Low  12 High  2 0 0 4.6 1.1   08/30/22 02:25:00 08/30/22 04:07:07 0.0 0.00 68 18 Low  10 2 1 0 4.9 1.3   08/29/22 01:45:00 08/29/22 03:10:20 0.0 0.00 66 19 Low  11 High  4 1 0 3.7 1.0   08/28/22 01:46:00 08/28/22 01:55:18 0.0 0.00 67 19 Low  10 4 1 0 3.9 1.1   08/27/22 06:40:00 08/27/22 08:07:06 0.0 0.00 67 17 Low  11 High  4 1 0 3.3 0.8 Low        Collection Time Result Time ABM KAREN ABB AIG DF   01/08/23 02:25:00 01/08/23 04:39:40 0.8 0.1 0.1 0.1 High  AUTOMATED   Previous Results   01/06/23 02:00:00 01/06/23 02:13:37 0.8 0.1 0.0 0.0 AUTOMATED   08/30/22 02:25:00 08/30/22 04:07:07 0.7 0.2 0.1 0.0 AUTOMATED   08/29/22 01:45:00 08/29/22 03:10:20 0.6 0.2 0.0 0.0 AUTOMATED   08/28/22 01:46:00 08/28/22 01:55:18 0.6 0.2 0.0 0.0 AUTOMATED   08/27/22 06:40:00 08/27/22 08:07:06 0.6 0.2 0.0 0.0 AUTOMATED         Final result                           Contains abnormal data METABOLIC PANEL, BASIC (Final result)   Component (Lab Inquiry)  Collection Time Result Time NA K CL CO2 AGAP GLU BUN CREA BUCR EGFR   01/08/23 02:25:00 01/08/23 04:50:29 139 4.1 106 25 8 116 High  24 High  0.96 25 High  >60        Pediatric calcula. .. Previous Results   01/06/23 02:00:00 01/06/23 02:26:44 138 3.9 104 30 4 108 High  20 High  1.00 20 58        Pediatric calcula. .. Low    08/30/22 02:25:00 08/30/22 04:36:43 140 3.8 109 25 6 94 19 High  0.51 Low  37 High     08/29/22 01:45:00 08/29/22 04:06:50 140 4.0 108 29 3 95 23 High  0.52 Low  44 High     08/28/22 01:46:00 08/28/22 02:26:48 140 3.6 109 28 3 99 18 0.52 Low  35 High     08/27/22 06:40:00 08/27/22 07:37:45 144 4.0 110 27 7 94 11 0.48 Low  23 High         Collection Time Result Time CA   01/08/23 02:25:00 01/08/23 04:50:29 9.3   Previous Results   01/06/23 02:00:00 01/06/23 02:26:44 9.6   08/30/22 02:25:00 08/30/22 04:36:43 9.6   08/29/22 01:45:00 08/29/22 04:06:50 9.5   08/28/22 01:46:00 08/28/22 02:26:48 8.9   08/27/22 06:40:00 08/27/22 07:37:45 9.0         Final result                          MAGNESIUM (Final result)   Component (Lab Inquiry)  Collection Time Result Time MG   01/08/23 02:25:00 01/08/23 04:50:29 2.1   Previous Results   01/06/23 02:00:00 01/06/23 02:24:43 2.3   08/29/22 01:45:00 08/29/22 04:06:50 2.2   08/28/22 01:46:00 08/28/22 02:26:48 2.1   08/27/22 06:40:00 08/27/22 07:37:45 2.1   08/26/22 04:28:00 08/26/22 05:14:39 2.2         Final result                          TROPONIN-HIGH SENSITIVITY (Final result)   Component (Lab Inquiry)  Collection Time Result Time TROPHS   01/08/23 02:25:00 01/08/23 04:53:09 5   A HS troponin value ch. ..    Previous Results   01/06/23 04:30:00 01/06/23 05:20:11 5   A HS troponin value ch. ..   01/06/23 02:00:00 01/06/23 02:31:34 4   A HS troponin value ch. ..   08/26/22 04:28:00 08/26/22 05:55:40 7   A HS troponin value ch. ..   08/26/22 00:32:00 08/26/22 00:59:47 6   A HS troponin value ch. ..   08/25/22 19:35:00 08/25/22 21:35:46 5   A HS troponin value ch. .. Final result                          NT-PRO BNP (Final result)   Component (Lab Inquiry)  Collection Time Result Time PBNP   01/08/23 02:25:00 01/08/23 04:55:41 101              For patients. .. Previous Results   08/25/22 19:35:00 08/25/22 21:30:44 255              For patients. .. Final result                         Radiologic Studies -   Preliminary findings  Portable chest x-ray  Interstitial changes otherwise unremarkable. Final radiology report pending  Harshal Abdi MD    XR CHEST PORT   Final Result      Right basilar parenchymal opacities represent either subsegmental atelectasis or   developing pneumonia. CT Results  (Last 48 hours)                 01/09/23 0340  CTA CHEST W OR W WO CONT Final result    Impression:      1. No evidence of pulmonary embolism. 2. No focal consolidative process in the chest.       Narrative:  EXAM: CTA chest       CLINICAL INDICATION/HISTORY:  Dyspnea. COMPARISON: 08/26/2022. TECHNIQUE: Axial CT imaging from the thoracic inlet through the diaphragm with   intravenous contrast. Coronal and sagittal MIP reformats were generated. One or more dose reduction techniques were used on this CT: automated exposure   control, adjustment of the mAs and/or kVp according to patient size, and   iterative reconstruction techniques. The specific techniques used on this CT   exam have been documented in the patient's electronic medical record.   Digital   Imaging and Communications in Medicine (DICOM) format image data are available   to nonaffiliated external healthcare facilities or entities on a secure, media   free, reciprocally searchable basis with patient authorization for at least a   12-month period after this study. _______________       FINDINGS:       EXAM QUALITY: Adequate opacification of the pulmonary arteries. PULMONARY ARTERIES: No evidence of pulmonary embolism. MEDIASTINUM: Normal heart size. No evidence of right heart strain. Aorta is   unremarkable. No pericardial effusion. LUNGS: No suspicious nodule or mass. Nominal subsegmental atelectasis but no   focal consolidation. PLEURA: Normal.       AIRWAY: Normal.       LYMPH NODES: No enlarged nodes. UPPER ABDOMEN: There is a small hiatal hernia. OTHER: No acute or aggressive osseous abnormalities identified. There is   dextroconvex thoracic scoliosis. SUPERFICIAL SOFT TISSUES: Unremarkable.       _______________                 CXR Results  (Last 48 hours)                 01/08/23 2221  XR CHEST PORT Final result    Impression:      No active cardiopulmonary disease. Narrative:  EXAM: CHEST RADIOGRAPH, SINGLE VIEW       CLINICAL INDICATION/HISTORY: Cough, shortness of breath, paroxysmal A fib       COMPARISON: Same day at 4:04 AM       TECHNIQUE: Portable frontal view of the chest was obtained at 10:15 PM.        _______________       FINDINGS:       SUPPORT DEVICES: None. HEART AND MEDIASTINUM: Cardiomediastinal silhouette appears within normal   limits. Normal caliber thoracic aorta. No central vascular congestion. LUNGS AND PLEURAL SPACES: Lungs are hyperinflated with no confluent airspace   opacity. No pleural effusion or pneumothorax. BONY THORAX AND SOFT TISSUES: No acute osseous abnormality. 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 _______________           01/08/23 0410  XR CHEST PORT Final result    Impression:      Right basilar parenchymal opacities represent either subsegmental atelectasis or   developing pneumonia.        Narrative:  EXAM: One view chest x-ray CLINICAL INDICATION/HISTORY: Dyspnea. COMPARISON: 01/06/2023. TECHNIQUE: Single AP view of the chest was obtained.       _______________       FINDINGS:       HEART, VESSELS, MEDIASTINUM: Heart size is normal. No vascular congestion. There   is atherosclerosis of the thoracic aorta. LUNGS, PLEURAL SPACES: Hazy parenchymal opacities in the right lung base. No   effusion or pneumothorax. BONY THORAX, SOFT TISSUES: There is bilateral shoulder arthropathy. _______________                   Medications given in the ED-  Medications   famotidine (PF) (PEPCID) injection 20 mg (20 mg IntraVENous Given 1/8/23 0416)   furosemide (LASIX) injection 40 mg (40 mg IntraVENous Given 1/8/23 0416)   mylanta/viscous lidocaine (GI COCKTAIL) (40 mL Oral Given 1/8/23 0416)   albuterol CONCENTRATE 2.5mg/0.5 mL neb soln (2.5 mg Nebulization Given 1/8/23 6741)   LORazepam (ATIVAN) injection 1 mg (1 mg IntraVENous Given 1/8/23 9734)         Medical Decision Making   I am the first provider for this patient. I reviewed the vital signs, available nursing notes, past medical history, past surgical history, family history and social history. Vital Signs-Reviewed the patient's vital signs. Pulse Oximetry Analysis -98% on room air    Cardiac Monitor:  Rate: 65 bpm  Rhythm: Sinus rhythm    EKG interpretation: (Preliminary)  3:50 AM   Sinus bradycardia, rate 59, normal ST segments with incomplete right bundle branch block, QTC is 441  EKG read by Bernardino Fields MD      Records Reviewed: NURSING NOTES AND PREVIOUS MEDICAL RECORDS    Provider Notes (Medical Decision Making): This appears to be an acute mild and recurrent condition. Dyspnea this evening probably associated with globus sensation and anxiety on top of her symptoms. Would not repeat flu and COVID as they were done yesterday and flu test chest x-ray were negative. That said I hear crackles in her right lung.   This could be edema although she shows no other findings of CHF. Repeat chest x-ray looks clear unlikely this is pneumonia. I doubt this is PE. Salts electrolytes sent to evaluate for electrolyte disorders. EKG is more unremarkable as well as troponin. Given crackles I gave her Lasix in addition CHF medicines. He seemed to be less helpful in her condition however Ativan resolved all her symptoms. The A. fib and reflux unlikely genuine contributors to her symptoms however feels she has a strong anxiety component (to which her  nods his head yes). We have reassured her as to findings and she is happy to go home. Procedures:  Procedures    ED Course:   3:51 AM : Initial assessment performed. The patients presenting problems have been discussed, and they are in agreement with the care plan formulated and outlined with them. I have encouraged them to ask questions as they arise throughout their visit. Diagnosis and Disposition       DISCHARGE NOTE:  6:42 AM  Angela Villaseñor's  results have been reviewed with her. She has been counseled regarding her diagnosis, treatment, and plan. She verbally conveys understanding and agreement of the signs, symptoms, diagnosis, treatment and prognosis and additionally agrees to follow up as discussed. She also agrees with the care-plan and conveys that all of her questions have been answered. I have also provided discharge instructions for her that include: educational information regarding their diagnosis and treatment, and list of reasons why they would want to return to the ED prior to their follow-up appointment, should her condition change. She has been provided with education for proper emergency department utilization. CLINICAL IMPRESSION:    1. Dyspnea, unspecified type    2. Paroxysmal A-fib ([address]    3. Mixed hyperlipidemia        PLAN:  1. D/C Home  2. Discharge Medication List as of 1/8/2023  6:46 AM        3.    Follow-up Information    None _______________________________    This note was partially transcribed via voice recognition software. Although efforts have been made to catch any discrepancies, it may contain sound alike words, grammatical errors, or nonsensical words.

## 2023-01-10 NOTE — PROGRESS NOTES
Problem: Falls - Risk of  Goal: *Absence of Falls  Description: Document Dada Aaron Fall Risk and appropriate interventions in the flowsheet.   Outcome: Progressing Towards Goal  Note: Fall Risk Interventions:  Mobility Interventions: Bed/chair exit alarm, Communicate number of staff needed for ambulation/transfer, Patient to call before getting OOB, Utilize walker, cane, or other assistive device (Utilize wheelchair)              Elimination Interventions: Bed/chair exit alarm, Call light in reach, Patient to call for help with toileting needs              Problem: Patient Education: Go to Patient Education Activity  Goal: Patient/Family Education  Outcome: Progressing Towards Goal     Problem: Pain  Goal: *Control of Pain  Outcome: Progressing Towards Goal     Problem: Patient Education: Go to Patient Education Activity  Goal: Patient/Family Education  Outcome: Progressing Towards Goal     Problem: Patient Education: Go to Patient Education Activity  Goal: Patient/Family Education  Outcome: Progressing Towards Goal     Problem: Afib Pathway: Day 1  Goal: Off Pathway (Use only if patient is Off Pathway)  Outcome: Progressing Towards Goal  Goal: Activity/Safety  Outcome: Progressing Towards Goal  Goal: Consults, if ordered  Outcome: Progressing Towards Goal  Goal: Diagnostic Test/Procedures  Outcome: Progressing Towards Goal  Goal: Nutrition/Diet  Outcome: Progressing Towards Goal  Goal: Discharge Planning  Outcome: Progressing Towards Goal  Goal: Medications  Outcome: Progressing Towards Goal  Goal: Respiratory  Outcome: Progressing Towards Goal  Goal: Treatments/Interventions/Procedures  Outcome: Progressing Towards Goal  Goal: Psychosocial  Outcome: Progressing Towards Goal  Goal: *Optimal pain control at patient's stated goal  Outcome: Progressing Towards Goal  Goal: *Hemodynamically stable  Outcome: Progressing Towards Goal  Goal: *Stable cardiac rhythm  Outcome: Progressing Towards Goal  Goal: *Lungs clear or at baseline  Outcome: Progressing Towards Goal  Goal: *Labs within defined limits  Outcome: Progressing Towards Goal  Goal: *Describes available resources and support systems  Outcome: Progressing Towards Goal     Problem: Afib Pathway: Day 2  Goal: Off Pathway (Use only if patient is Off Pathway)  Outcome: Progressing Towards Goal  Goal: Activity/Safety  Outcome: Progressing Towards Goal  Goal: Consults, if ordered  Outcome: Progressing Towards Goal  Goal: Diagnostic Test/Procedures  Outcome: Progressing Towards Goal  Goal: Nutrition/Diet  Outcome: Progressing Towards Goal  Goal: Discharge Planning  Outcome: Progressing Towards Goal  Goal: Medications  Outcome: Progressing Towards Goal  Goal: Respiratory  Outcome: Progressing Towards Goal  Goal: Treatments/Interventions/Procedures  Outcome: Progressing Towards Goal  Goal: Psychosocial  Outcome: Progressing Towards Goal  Goal: *Hemodynamically stable  Outcome: Progressing Towards Goal  Goal: *Optimal pain control at patient's stated goal  Outcome: Progressing Towards Goal  Goal: *Stable cardiac rhythm  Outcome: Progressing Towards Goal  Goal: *Lungs clear or at baseline  Outcome: Progressing Towards Goal  Goal: *Describes available resources and support systems  Outcome: Progressing Towards Goal     Problem: Afib Pathway: Day 3  Goal: Off Pathway (Use only if patient is Off Pathway)  Outcome: Progressing Towards Goal  Goal: Activity/Safety  Outcome: Progressing Towards Goal  Goal: Diagnostic Test/Procedures  Outcome: Progressing Towards Goal  Goal: Nutrition/Diet  Outcome: Progressing Towards Goal  Goal: Discharge Planning  Outcome: Progressing Towards Goal  Goal: Medications  Outcome: Progressing Towards Goal  Goal: Respiratory  Outcome: Progressing Towards Goal  Goal: Treatments/Interventions/Procedures  Outcome: Progressing Towards Goal  Goal: Psychosocial  Outcome: Progressing Towards Goal     Problem: Afib: Discharge Outcomes (not in CAT)  Goal: *Hemodynamically stable  Outcome: Progressing Towards Goal  Goal: *Stable cardiac rhythm  Outcome: Progressing Towards Goal  Goal: *Lungs clear or at baseline  Outcome: Progressing Towards Goal  Goal: *Optimal pain control at patient's stated goal  Outcome: Progressing Towards Goal  Goal: *Identifies cardiac risk factors  Outcome: Progressing Towards Goal  Goal: *Verbalizes home exercise program, activity guidelines, cardiac precautions  Outcome: Progressing Towards Goal  Goal: *Verbalizes understanding and describes prescribed diet  Outcome: Progressing Towards Goal  Goal: *Verbalizes understanding and describes medication purposes and frequencies  Outcome: Progressing Towards Goal  Goal: *Anxiety reduced or absent  Outcome: Progressing Towards Goal  Goal: *Understands and describes signs and symptoms to report to providers(Stroke Metric)  Outcome: Progressing Towards Goal  Goal: *Describes follow-up/return visits to physicians  Outcome: Progressing Towards Goal  Goal: *Describes available resources and support systems  Outcome: Progressing Towards Goal  Goal: *Influenza immunization  Outcome: Progressing Towards Goal  Goal: *Pneumococcal immunization  Outcome: Progressing Towards Goal  Goal: *Describes smoking cessation resources  Outcome: Progressing Towards Goal

## 2023-01-10 NOTE — PROGRESS NOTES
01/10/23 1047   Consult Information   Reason for Consult Advance medical directive consult; Initial/Spiritual assessment, patient floor   Time In 1015   Time Out 1023   Total Time (in minutes) 8   Pastoral Interventions   Patient Interventions Advance medical directive consult; Affirmation of barry;Bridging;Coping skills reviewed/reinforced; Iconic (affirming the presence of God/Higher Power); Initial/Spiritual assessment, patient floor;Prayer (actual); Prayer (assurance of); Spiritual materials provided (comment)   Follow up Date 01/10/23  (IV,SA,AMD-FF)   Dashboard Data Collection   Critical care assessment within 60 hours of arrival Yes    conducted an initial consultation and Spiritual Assessment for [facility], who is a 65 y.o.,female. Patients Primary Language is: [place]. According to the patients EMR Jainism Affiliation is: Quaker. The reason the Patient came to the hospital is:   Patient Active Problem List    Diagnosis Date Noted    Paroxysmal atrial fibrillation ([address] 01/09/2023    SOB (shortness of breath) 01/09/2023    Atrial fibrillation ([address] 08/26/2022    Dizziness 08/26/2022    Ataxia 08/26/2022    Constipated 08/26/2022    Atelectasis 04/06/2018    New onset a-fib ([address] 04/06/2018    SIRS (systemic inflammatory response syndrome) ([address] 04/05/2018    History of back surgery 04/05/2018    Spondylolisthesis of lumbar region 03/29/2018    Scoliosis of thoracolumbar spine 03/29/2018    DDD (degenerative disc disease), lumbar 03/26/2018    Acute blood loss anemia 05/21/2014    OA (osteoarthritis) of hip 05/20/2014    Gita's cerebellar ataxia ([address] 05/20/2014    DDD (degenerative disc disease), cervical 10/04/2012        The  provided the following Interventions:  Initiated a relationship of care and support. Listened empathically. Provided information about Spiritual Care Services.   Addressed patient's ACP, patient has one on file and it is current with her wishes. Provided prayer for patient's healing and recovery. Chart reviewed. The following outcomes where achieved:   confirmed Patient's Restoration Affiliation.  provided devotional material for patient. Patient expressed gratitude for 's visit. Assessment:  Patient does not have any Buddhism/cultural needs that will affect patients preferences in health care. Plan:  Chaplains will continue to follow and will provide pastoral care on an as needed/requested basis.  recommends bedside caregivers page  on duty if patient shows signs of acute spiritual or emotional distress. Rev.  Quique Mooney, Certified Respecting [address] [place] [address] Consultant  [facility]  [phone]

## 2023-01-10 NOTE — PROGRESS NOTES
Hospitalist Progress Note    Patient: Alvin Aguirre MRN: [id number]  CSN: [id number]    YOB: 1945  Age: 65 y.o. Sex: female    DOA: 1/8/2023 LOS:  LOS: 1 day          Chief Complaint:    SOB      Assessment/Plan        69-year-old female with history of cerebellar ataxia paroxysmal atrial fibs presents to the emergency room with shortness of breath and bouts of atrial tachycardia        Assessment/Plan      1. Paroxysmal A. Fib with associated dyspnea  Continue amiodarone  Improved sx and heart rate  Echo with nl EF  Not on AC  No PE     Cardiac enzyme trended flat     2.  UTI  On Rocephin  Follow culture     3. Cerebellar ataxia  Continue gabapentin and Zanaflex   PT and OT consulted     4. Diabetes  On Lantus and mealtime insulin oral agents held     5.   Abnormal TSH   Started on synthroid    D/c planning 24-48 hrs  Disposition :  Patient Active Problem List   Diagnosis Code    DDD (degenerative disc disease), cervical M50.30    OA (osteoarthritis) of hip M16.9    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    Acute blood loss anemia D62    DDD (degenerative disc disease), lumbar M51.36    Spondylolisthesis of lumbar region M43.16    Scoliosis of thoracolumbar spine M41.9    SIRS (systemic inflammatory response syndrome) ([facility] R65.10    History of back surgery Z98.890    Atelectasis J98.11    New onset a-fib ([facility] I48.91    Atrial fibrillation (HCC) I48.91    Dizziness R42    Ataxia R27.0    Constipated K59.00    Paroxysmal atrial fibrillation (HCC) I48.0    SOB (shortness of breath) R06.02    Acquired hypothyroidism E03.9       Subjective:    I feel better  Denies tremors, nausea, CP, SOB    Review of systems:      Respiratory: denies SOB, cough  Cardiovascular: denies chest pain, palpitations  Gastrointestinal: denies nausea, vomiting, diarrhea      Vital signs/Intake and Output:  Visit Vitals  BP (!) 125/55   Pulse (!) 58   Temp 98 °F (36.7 °C)   Resp 16   Ht 4' 10\" (1.473 m)   Wt 49.9 kg (110 lb)   SpO2 96%   BMI 22.99 kg/m²     Current Shift:  01/10 0701 - 01/10 1900  In: 470 [P.O.:420; I.V.:50]  Out: -   Last three shifts:  No intake/output data recorded. Exam:    General: frail elderly Wf,  alert, NAD, OX3  Head/Neck: NCAT, supple, No masses, No lymphadenopathy  CVS:Regular rate irreg rhythm no M/R/G, S1/S2 heard, no thrill  Lungs:Clear to auscultation bilaterally, no wheezes, rhonchi, or rales  Abdomen: Soft, Nontender, No distention, Normal Bowel sounds  Extremities: No C/C/E, pulses palpable 2+  Neuro:grossly normal , follows commands  Psych:appropriate                Labs: Results:       Chemistry Recent Labs     01/10/23  0144 01/08/23  2149 01/08/23  0225   * 108* 116*    138 139   K 4.2 3.9 4.1    102 106   CO2 31 30 25   BUN 24* 25* 24*   CREA 0.70 0.92 0.96   CA 9.1 10.1 9.3   AGAP 4 6 8   BUCR 34* 27* 25*   AP 76 87  --    TP 6.7 7.8  --    ALB 3.5 4.4  --    GLOB 3.2 3.4  --    AGRAT 1.1 1.3  --       CBC w/Diff Recent Labs     01/10/23  0144 01/08/23  2149 01/08/23  0225   WBC 5.9 7.1 7.7   RBC 3.95* 4.14* 3.90*   HGB 13.1 13.9 12.9   HCT 39.8 42.2 39.3    277 271   GRANS 67 75* 76*   LYMPH 18* 12* 12*   EOS 2 1 1      Cardiac Enzymes No results for input(s): CPK, CKND1, ELEONORA in the last 72 hours. No lab exists for component: CKRMB, TROIP   Coagulation No results for input(s): PTP, INR, APTT, INREXT, INREXT in the last 72 hours. Lipid Panel No results found for: CHOL, CHOLPOCT, CHOLX, CHLST, CHOLV, 348483, HDL, HDLP, LDL, LDLC, DLDLP, 517247, VLDLC, VLDL, TGLX, TRIGL, TRIGP, TGLPOCT, CHHD, CHHDX   BNP No results for input(s): BNPP in the last 72 hours.    Liver Enzymes Recent Labs     01/10/23  0144   TP 6.7   ALB 3.5   AP 76      Thyroid Studies Lab Results   Component Value Date/Time    TSH 23.50 (H) 01/09/2023 05:18 AM        Procedures/imaging: see electronic medical records for all procedures/Xrays and details which were not copied into this note but were reviewed prior to creation of Jesus Lynch MD

## 2023-01-11 ENCOUNTER — HOME HEALTH ADMISSION (OUTPATIENT)
Dept: HOME HEALTH SERVICES | Facility: HOME HEALTH | Age: 78
End: 2023-01-11
Payer: MEDICARE

## 2023-01-11 VITALS
HEIGHT: 58 IN | RESPIRATION RATE: 18 BRPM | BODY MASS INDEX: 23.09 KG/M2 | SYSTOLIC BLOOD PRESSURE: 132 MMHG | WEIGHT: 110 LBS | DIASTOLIC BLOOD PRESSURE: 58 MMHG | OXYGEN SATURATION: 93 % | TEMPERATURE: 97.8 F | HEART RATE: 77 BPM

## 2023-01-11 LAB
ALBUMIN SERPL-MCNC: 3.5 G/DL (ref 3.4–5)
ALBUMIN/GLOB SERPL: 1 (ref 0.8–1.7)
ALP SERPL-CCNC: 79 U/L (ref 45–117)
ALT SERPL-CCNC: 34 U/L (ref 13–56)
ANION GAP SERPL CALC-SCNC: 4 MMOL/L (ref 3–18)
AST SERPL-CCNC: 21 U/L (ref 10–38)
BASOPHILS # BLD: 0 K/UL (ref 0–0.1)
BASOPHILS NFR BLD: 1 % (ref 0–2)
BILIRUB SERPL-MCNC: 0.4 MG/DL (ref 0.2–1)
BUN SERPL-MCNC: 28 MG/DL (ref 7–18)
BUN/CREAT SERPL: 27 (ref 12–20)
CALCIUM SERPL-MCNC: 9.3 MG/DL (ref 8.5–10.1)
CHLORIDE SERPL-SCNC: 104 MMOL/L (ref 100–111)
CO2 SERPL-SCNC: 30 MMOL/L (ref 21–32)
CREAT SERPL-MCNC: 1.02 MG/DL (ref 0.6–1.3)
DIFFERENTIAL METHOD BLD: ABNORMAL
EOSINOPHIL # BLD: 0.1 K/UL (ref 0–0.4)
EOSINOPHIL NFR BLD: 2 % (ref 0–5)
ERYTHROCYTE [DISTWIDTH] IN BLOOD BY AUTOMATED COUNT: 14 % (ref 11.6–14.5)
GLOBULIN SER CALC-MCNC: 3.5 G/DL (ref 2–4)
GLUCOSE BLD STRIP.AUTO-MCNC: 94 MG/DL (ref 70–110)
GLUCOSE SERPL-MCNC: 92 MG/DL (ref 74–99)
HCT VFR BLD AUTO: 41.7 % (ref 35–45)
HGB BLD-MCNC: 13.3 G/DL (ref 12–16)
IMM GRANULOCYTES # BLD AUTO: 0.1 K/UL (ref 0–0.04)
IMM GRANULOCYTES NFR BLD AUTO: 1 % (ref 0–0.5)
LYMPHOCYTES # BLD: 1 K/UL (ref 0.9–3.6)
LYMPHOCYTES NFR BLD: 13 % (ref 21–52)
MAGNESIUM SERPL-MCNC: 2.5 MG/DL (ref 1.6–2.6)
MCH RBC QN AUTO: 33.2 PG (ref 24–34)
MCHC RBC AUTO-ENTMCNC: 31.9 G/DL (ref 31–37)
MCV RBC AUTO: 104 FL (ref 78–100)
MONOCYTES # BLD: 0.9 K/UL (ref 0.05–1.2)
MONOCYTES NFR BLD: 12 % (ref 3–10)
NEUTS SEG # BLD: 5.5 K/UL (ref 1.8–8)
NEUTS SEG NFR BLD: 73 % (ref 40–73)
NRBC # BLD: 0 K/UL (ref 0–0.01)
NRBC BLD-RTO: 0 PER 100 WBC
PLATELET # BLD AUTO: 272 K/UL (ref 135–420)
PMV BLD AUTO: 10.6 FL (ref 9.2–11.8)
POTASSIUM SERPL-SCNC: 4.4 MMOL/L (ref 3.5–5.5)
PROT SERPL-MCNC: 7 G/DL (ref 6.4–8.2)
RBC # BLD AUTO: 4.01 M/UL (ref 4.2–5.3)
SODIUM SERPL-SCNC: 138 MMOL/L (ref 136–145)
WBC # BLD AUTO: 7.5 K/UL (ref 4.6–13.2)

## 2023-01-11 PROCEDURE — 74011250636 HC RX REV CODE- 250/636: Performed by: EMERGENCY MEDICINE

## 2023-01-11 PROCEDURE — 80053 COMPREHEN METABOLIC PANEL: CPT

## 2023-01-11 PROCEDURE — 74011000258 HC RX REV CODE- 258: Performed by: EMERGENCY MEDICINE

## 2023-01-11 PROCEDURE — 82962 GLUCOSE BLOOD TEST: CPT

## 2023-01-11 PROCEDURE — 74011250637 HC RX REV CODE- 250/637: Performed by: INTERNAL MEDICINE

## 2023-01-11 PROCEDURE — 74011250637 HC RX REV CODE- 250/637: Performed by: HOSPITALIST

## 2023-01-11 PROCEDURE — 85025 COMPLETE CBC W/AUTO DIFF WBC: CPT

## 2023-01-11 PROCEDURE — 83735 ASSAY OF MAGNESIUM: CPT

## 2023-01-11 PROCEDURE — 36415 COLL VENOUS BLD VENIPUNCTURE: CPT

## 2023-01-11 RX ORDER — CEFUROXIME AXETIL 500 MG/1
250 TABLET ORAL 2 TIMES DAILY
Qty: 3 TABLET | Refills: 0 | Status: SHIPPED | OUTPATIENT
Start: 2023-01-11 | End: 2023-01-14

## 2023-01-11 RX ORDER — LEVOTHYROXINE SODIUM 25 UG/1
25 TABLET ORAL
Qty: 30 TABLET | Refills: 1 | Status: SHIPPED | OUTPATIENT
Start: 2023-01-12

## 2023-01-11 RX ADMIN — LEVOTHYROXINE SODIUM 25 MCG: 0.03 TABLET ORAL at 06:27

## 2023-01-11 RX ADMIN — POLYETHYLENE GLYCOL 3350 17 G: 17 POWDER, FOR SOLUTION ORAL at 09:10

## 2023-01-11 RX ADMIN — ENOXAPARIN SODIUM 30 MG: 100 INJECTION SUBCUTANEOUS at 09:09

## 2023-01-11 RX ADMIN — AMIODARONE HYDROCHLORIDE 200 MG: 200 TABLET ORAL at 09:09

## 2023-01-11 RX ADMIN — CALCIUM CARBONATE-VITAMIN D TAB 500 MG-200 UNIT 1 TABLET: 500-200 TAB at 09:09

## 2023-01-11 RX ADMIN — CEFTRIAXONE 1 G: 1 INJECTION, POWDER, FOR SOLUTION INTRAMUSCULAR; INTRAVENOUS at 02:44

## 2023-01-11 RX ADMIN — Medication 500 MG: at 09:09

## 2023-01-11 NOTE — PROGRESS NOTES
PT discharging home today. Reviewed Discharge instructions, future appointments, and medications with patient and  at bedside. Patient did not have any questions at this time. Removed heart monitor. Removed IV, catheter is still intact.

## 2023-01-11 NOTE — PROGRESS NOTES
Patient to DC home with - [employer]     Patient to DC home with [facility] care Following. Orders have been placed. Family to transport.

## 2023-01-11 NOTE — DISCHARGE SUMMARY
[address]    Name:  Nhan Vasques  MR#:   [id number]  :  1945  ACCOUNT #:  [de-identified]  ADMIT DATE:  2023  DISCHARGE DATE:  2023      DISCHARGE DIAGNOSES:  1. Paroxysmal atrial fibrillation. 2.  Chronic atrial fibrillation, paroxysmal.  3.  Urinary tract infection. 4.  Cerebellar ataxia. 5.  Diabetes. 6.  Hypothyroidism. DISCHARGE INSTRUCTIONS:  The patient's followup is with Dr. Quentin Parekh in 1 week if able to do so, and Dr. Pili Reyna tomorrow on . DISCHARGE MEDICATIONS:  Her medications for discharge include,  1. Synthroid 25 mcg daily. 2.  Tylenol 650 every 6 hours as needed for pain. 3.  Ascorbic acid 500 mg daily. 4.  Calcium with vitamin D one tablet twice daily. 5.  Ceftin 250 mg twice daily for 3 days. 6.  Celebrex 100 mg twice daily. 7.  Multivitamin once a day. 8.  Gabapentin 400 mg at night. 9.  Melatonin 5 mg at night. 10.  Prilosec 20 mg daily. 11.  Paxil 20 mg daily. 12.  MiraLax 17 g daily. 13.  Amiodarone is 200 mg per her usual dose. PHYSICAL EXAMINATION:  GENERAL:  Today's exam shows an elderly, awake, bright 69-year-old female, in no acute distress, resting comfortably,  at the bedside. VITAL SIGNS:  Saturation is 93% on room air, respiratory rate 18, blood pressure 132/58, pulse 77, temperature 97.8. LUNGS:  Clear. CARDIAC:  Irregular rhythm, regular rate. ABDOMEN:  Benign, soft, nontender. LOWER EXTREMITIES:  No edema. HOSPITAL SUMMARY:  A 69-year-old female with known AFib, on amiodarone, came into the hospital complaining of dyspnea, shortness breath for 1 week, shaking chills, general malaise. She is on chronic amiodarone but is following up with Cardiology tomorrow. She is mostly wheelchair-bound from her cerebellar ataxia, but has not had any falls recently. There was concern about urinary tract infection. She was admitted to telemetry for close monitoring.   Her symptoms have really resolved fairly quickly. She has received antibiotics. Urine culture came back mixed urogenital melissa. Heart rate has been stable here. Influenza test was negative. Blood cultures are no growth. Urine culture as noted mixed melissa. She had a CT angiogram of the chest that showed no evidence for pulmonary embolism or pneumonia. Chemistry is unremarkable. Creatinine is within normal limits. LFTs also. Troponins were normal.  BNP also at 101. White blood cell count within normal range. H and H 13.3 and 41.7, platelet count is 632,022. Echocardiogram was completed that shows ejection fraction of 60%-65%. No aortic stenosis. Today, she denies chest pain, shortness of breath, nausea or headache. No palpitations either. She gets around mostly in a wheelchair. Her  will transport her home in that. Followup is as noted. She is clinically stable to be released from the hospital today which she desires. Follow up with Cardiology tomorrow. Regular low-sodium diet at home. Activity per her usual baseline. She is stable. Forty five minutes discharge time.       Rafael Fernandez MD      [id number]  D:  01/11/2023 9:57  T:  01/11/2023 12:12  JOB #:  [id number]  CC:  Karis Flowers MD

## 2023-01-11 NOTE — DISCHARGE INSTRUCTIONS
DISCHARGE SUMMARY from Nurse    PATIENT INSTRUCTIONS:    For 24 hours or while taking prescription Narcotics:  Limit your activities  Do not drive and operate hazardous machinery  Do not make important personal or business decisions  Do  not drink alcoholic beverages  If you have not urinated within 8 hours after discharge, please contact your surgeon on call. Report the following to your Doctor:  Excessive pain, swelling, redness or odor from any site  Temperature over 100.5  Nausea and vomiting lasting longer than 4 hours or if unable to take medications  Any signs of decreased circulation or nerve impairment to extremity: change in color, persistent  numbness, tingling, coldness or increase pain  Any questions    What to do at Home:  Recommended activity: No heavy lifting, pushing, pulling until cleared by Doctor. If you experience any  symptoms, please follow up with your Doctor. * Please give a list of your current medications to your Primary Care Provider. *  Please update this list whenever your medications are discontinued, doses are      changed, or new medications (including over-the-counter products) are added. *  Please carry medication information at all times in case of emergency situations. These are general instructions for a healthy lifestyle:    No smoking/ No tobacco products/ Avoid exposure to second hand smoke  Surgeon General's Warning:  Quitting smoking now greatly reduces serious risk to your health. Obesity, smoking, and sedentary lifestyle greatly increases your risk for illness    A healthy diet, regular physical exercise & weight monitoring are important for maintaining a healthy lifestyle    You may be retaining fluid if you have a history of heart failure or if you experience any of the following symptoms:  Weight gain of 3 pounds or more overnight or 5 pounds in a week, increased swelling in our hands or feet or shortness of breath while lying flat in bed. Please call your doctor as soon as you notice any of these symptoms; do not wait until your next office visit. The discharge information has been reviewed with the patient. The patient verbalized understanding. Discharge medications reviewed with the patient and appropriate educational materials and side effects teaching were provided.   ___________________________________________________________________________________________________________________________________

## 2023-01-11 NOTE — PROGRESS NOTES
Problem: Falls - Risk of  Goal: *Absence of Falls  Description: Document Abhishek Cedeno Fall Risk and appropriate interventions in the flowsheet.   Outcome: Progressing Towards Goal  Note: Fall Risk Interventions:  Mobility Interventions: Bed/chair exit alarm, Communicate number of staff needed for ambulation/transfer, Patient to call before getting OOB, Utilize walker, cane, or other assistive device         Medication Interventions: Bed/chair exit alarm, Evaluate medications/consider consulting pharmacy, Teach patient to arise slowly, Patient to call before getting OOB    Elimination Interventions: Bed/chair exit alarm, Call light in reach, Patient to call for help with toileting needs, Toileting schedule/hourly rounds              Problem: Pain  Goal: *Control of Pain  Outcome: Progressing Towards Goal     Problem: Patient Education: Go to Patient Education Activity  Goal: Patient/Family Education  Outcome: Progressing Towards Goal

## 2023-01-11 NOTE — PROGRESS NOTES
Problem: Falls - Risk of  Goal: *Absence of Falls  Description: Document Teressa Hill Fall Risk and appropriate interventions in the flowsheet.   Outcome: Progressing Towards Goal  Note: Fall Risk Interventions:  Mobility Interventions: Bed/chair exit alarm, Communicate number of staff needed for ambulation/transfer, Patient to call before getting OOB, Utilize walker, cane, or other assistive device         Medication Interventions: Bed/chair exit alarm, Evaluate medications/consider consulting pharmacy, Patient to call before getting OOB, Teach patient to arise slowly    Elimination Interventions: Bed/chair exit alarm, Call light in reach, Patient to call for help with toileting needs, Toileting schedule/hourly rounds              Problem: Patient Education: Go to Patient Education Activity  Goal: Patient/Family Education  Outcome: Progressing Towards Goal     Problem: Pain  Goal: *Control of Pain  Outcome: Progressing Towards Goal     Problem: Patient Education: Go to Patient Education Activity  Goal: Patient/Family Education  Outcome: Progressing Towards Goal     Problem: Patient Education: Go to Patient Education Activity  Goal: Patient/Family Education  Outcome: Progressing Towards Goal     Problem: Afib Pathway: Day 1  Goal: Off Pathway (Use only if patient is Off Pathway)  Outcome: Progressing Towards Goal  Goal: Activity/Safety  Outcome: Progressing Towards Goal  Goal: Consults, if ordered  Outcome: Progressing Towards Goal  Goal: Diagnostic Test/Procedures  Outcome: Progressing Towards Goal  Goal: Nutrition/Diet  Outcome: Progressing Towards Goal  Goal: Discharge Planning  Outcome: Progressing Towards Goal  Goal: Medications  Outcome: Progressing Towards Goal  Goal: Respiratory  Outcome: Progressing Towards Goal  Goal: Treatments/Interventions/Procedures  Outcome: Progressing Towards Goal  Goal: Psychosocial  Outcome: Progressing Towards Goal  Goal: *Optimal pain control at patient's stated goal  Outcome: Progressing Towards Goal  Goal: *Hemodynamically stable  Outcome: Progressing Towards Goal  Goal: *Stable cardiac rhythm  Outcome: Progressing Towards Goal  Goal: *Lungs clear or at baseline  Outcome: Progressing Towards Goal  Goal: *Labs within defined limits  Outcome: Progressing Towards Goal  Goal: *Describes available resources and support systems  Outcome: Progressing Towards Goal     Problem: Afib Pathway: Day 2  Goal: Off Pathway (Use only if patient is Off Pathway)  Outcome: Progressing Towards Goal  Goal: Activity/Safety  Outcome: Progressing Towards Goal  Goal: Consults, if ordered  Outcome: Progressing Towards Goal  Goal: Diagnostic Test/Procedures  Outcome: Progressing Towards Goal  Goal: Nutrition/Diet  Outcome: Progressing Towards Goal  Goal: Discharge Planning  Outcome: Progressing Towards Goal  Goal: Medications  Outcome: Progressing Towards Goal  Goal: Respiratory  Outcome: Progressing Towards Goal  Goal: Treatments/Interventions/Procedures  Outcome: Progressing Towards Goal  Goal: Psychosocial  Outcome: Progressing Towards Goal  Goal: *Hemodynamically stable  Outcome: Progressing Towards Goal  Goal: *Optimal pain control at patient's stated goal  Outcome: Progressing Towards Goal  Goal: *Stable cardiac rhythm  Outcome: Progressing Towards Goal  Goal: *Lungs clear or at baseline  Outcome: Progressing Towards Goal  Goal: *Describes available resources and support systems  Outcome: Progressing Towards Goal     Problem: Afib Pathway: Day 3  Goal: Off Pathway (Use only if patient is Off Pathway)  Outcome: Progressing Towards Goal  Goal: Activity/Safety  Outcome: Progressing Towards Goal  Goal: Diagnostic Test/Procedures  Outcome: Progressing Towards Goal  Goal: Nutrition/Diet  Outcome: Progressing Towards Goal  Goal: Discharge Planning  Outcome: Progressing Towards Goal  Goal: Medications  Outcome: Progressing Towards Goal  Goal: Respiratory  Outcome: Progressing Towards Goal  Goal: Treatments/Interventions/Procedures  Outcome: Progressing Towards Goal  Goal: Psychosocial  Outcome: Progressing Towards Goal     Problem: Afib: Discharge Outcomes (not in CAT)  Goal: *Hemodynamically stable  Outcome: Progressing Towards Goal  Goal: *Stable cardiac rhythm  Outcome: Progressing Towards Goal  Goal: *Lungs clear or at baseline  Outcome: Progressing Towards Goal  Goal: *Optimal pain control at patient's stated goal  Outcome: Progressing Towards Goal  Goal: *Identifies cardiac risk factors  Outcome: Progressing Towards Goal  Goal: *Verbalizes home exercise program, activity guidelines, cardiac precautions  Outcome: Progressing Towards Goal  Goal: *Verbalizes understanding and describes prescribed diet  Outcome: Progressing Towards Goal  Goal: *Verbalizes understanding and describes medication purposes and frequencies  Outcome: Progressing Towards Goal  Goal: *Anxiety reduced or absent  Outcome: Progressing Towards Goal  Goal: *Understands and describes signs and symptoms to report to providers(Stroke Metric)  Outcome: Progressing Towards Goal  Goal: *Describes follow-up/return visits to physicians  Outcome: Progressing Towards Goal  Goal: *Describes available resources and support systems  Outcome: Progressing Towards Goal  Goal: *Influenza immunization  Outcome: Progressing Towards Goal  Goal: *Pneumococcal immunization  Outcome: Progressing Towards Goal  Goal: *Describes smoking cessation resources  Outcome: Progressing Towards Goal     Problem: Pressure Injury - Risk of  Goal: *Prevention of pressure injury  Description: Document Ko Scale and appropriate interventions in the flowsheet.   Outcome: Progressing Towards Goal     Problem: Patient Education: Go to Patient Education Activity  Goal: Patient/Family Education  Outcome: Progressing Towards Goal

## 2023-01-14 ENCOUNTER — HOME CARE VISIT (OUTPATIENT)
Dept: SCHEDULING | Facility: HOME HEALTH | Age: 78
End: 2023-01-14
Payer: MEDICARE

## 2023-01-14 PROCEDURE — G0299 HHS/HOSPICE OF RN EA 15 MIN: HCPCS

## 2023-01-15 ENCOUNTER — HOME CARE VISIT (OUTPATIENT)
Dept: SCHEDULING | Facility: HOME HEALTH | Age: 78
End: 2023-01-15
Payer: MEDICARE

## 2023-01-15 PROCEDURE — G0151 HHCP-SERV OF PT,EA 15 MIN: HCPCS

## 2023-01-15 NOTE — Clinical Note
Therapy Functional Score Assessment  Question   Score   Grooming  2   Upper Dressing 2   Lower Dressing 3   Bathing  5    Toilet Transfer  2   Transfer  2          Ambulation  5   Dyspnea                     2   Pain Interfering with activity 0  Est number therapy visits      9

## 2023-01-15 NOTE — HOME HEALTH
Skilled services/Home bound verification:     Skilled Reason for admission/summary of clinical condition:  Juan Martinez is a 65 y.o.  female who has history of chronic back pain, cerebellar ataxia, paroxysmal A. fib and depression presents to the emergency room with complaints of shortness of breath for 1 week. Shaking chills and general malaise . She has had several visits  to the emergency room troponins were done EKGs were done which showed no significant abnormality. Today she had a brief episode of tachycardia and atrial flutter that lasted less than 2 minutes associated with her dyspnea. Due to her coming to the emergency room 3 times and being on chronic amiodarone with malaise  if is felt  she should be admitted for further testing. Patient is mostly wheel chair bound from her cerebellar ataxia and has not had any falls in the past 6 months. .  This patient is homebound for the following reasons Requires considerable and taxing effort to leave the home . Caregiver: friend. Caregiver assists with IADL's. Medications reconciled and all medications are available in the home this visit. Home health supplies by type and quantity ordered/delivered this visit include: none     Patient education provided this visit to include: HEP, fall prevention, proper breathing technique, dc planning . Patient level of understanding of education provided: Patient was able to partially teach back HEP     [place]arps Education Provided: n/a  Patient response to procedure performed:  Patient needed visual cues for HEP     Pt/Caregiver instructed on plan of care and are agreeable to plan of care at this time.     PMHx: Arthritis      Chronic pain        lower back    GERD (gastroesophageal reflux disease)      Ill-defined condition        Gita cerebella ataxia    Menopause        Age 47    Other ill-defined conditions(799.89)        Gita's Cerebellar Ataxia    Other ill-defined conditions(799.89) Osmani    Psychiatric disorder        depression        S: SOB after short period of activity   O:Patients Goals= Be able to go back to PLOF  Wound/Incision: location, description, drainage: none   PLOF: Lives with  in a 2 level house, prior to referral, she was needing assistance from  with ADLs and IADL's and was not able to ambulate secondary to ataxia. was using w/c for primary mode of mobility   STRENGTH B hip flexor, extensor, hip abductors, adductors 3/5, B knee flexor extensor 3/5  SIT TO STAND from chair with Mod A x 1 for transfers purposes   BALANCE non ambulatory  GAIT non ambulatory, uses w/c as primary mode of mobility   BED MOBILITY Patient needed Min A x1 with bed mobility   TRANSFERS patient needed Mod A x1 with bed., chair and toilet transfers using RW   A: PT evaluation completed POC established, Med rec done, HEP established  P:Home Safety eval/recommendations: Home health physical therapy initial evaluation performed. Patient demonstrates decreased strength, balance, and endurance which increases patient's overall fall risk and burden of care. Patient would benefit from home health physical therapy to improve balance, strength, and endurance which would decrease fall risk and allow patient to return to prior level of function once all functional goals or full rehab potential is met. patient educated with HEP including seated hip flex, knee extension, hip abduction, hip adduction, ankle PF and DF and ball squeeze x 20 reps x 3 sets daily to improve MMT on BLE to facilitate with improved bed mobility and transfers . patient also educated with deep breathing exercises to be done daily x 10 reps x 3 sets to prevent SOB during activity.  Patient educated with fall prevention technique by decluttering space to have a safe pathway for gait

## 2023-01-15 NOTE — Clinical Note
3w1 2w3  non ambulatory due to ataxia, PT to work on general strengthening, transfers and w/c management

## 2023-01-16 ENCOUNTER — HOME CARE VISIT (OUTPATIENT)
Dept: SCHEDULING | Facility: HOME HEALTH | Age: 78
End: 2023-01-16
Payer: MEDICARE

## 2023-01-16 VITALS
OXYGEN SATURATION: 97 % | SYSTOLIC BLOOD PRESSURE: 120 MMHG | DIASTOLIC BLOOD PRESSURE: 80 MMHG | HEART RATE: 76 BPM | RESPIRATION RATE: 18 BRPM | TEMPERATURE: 97.7 F

## 2023-01-16 VITALS
TEMPERATURE: 98.2 F | OXYGEN SATURATION: 92 % | DIASTOLIC BLOOD PRESSURE: 53 MMHG | RESPIRATION RATE: 16 BRPM | SYSTOLIC BLOOD PRESSURE: 110 MMHG | HEART RATE: 56 BPM

## 2023-01-16 PROCEDURE — G0299 HHS/HOSPICE OF RN EA 15 MIN: HCPCS

## 2023-01-16 PROCEDURE — G0157 HHC PT ASSISTANT EA 15: HCPCS

## 2023-01-16 NOTE — HOME HEALTH
SUBJECTIVE: I need my legs stronger  CAREGIVER INVOLVEMENT/ASSISTANCE NEEDED FOR: Lives with  who can assist with ADLs and driving  . OBJECTIVE:  See interventions. PATIENT EDUCATION PROVIDED THIS VISIT: Complete B LE HEP for strengthening. B LE DF/PF, marching, LAQ to complete to full AROM of knee with 3 second hold, hip ADD with pillow. to complete 2x10 of each   PATIENT RESPONSE TO EDUCATION PROVIDED: Patient verbalized understanding     PATIENT RESPONSE TO TREATMENT: No increase in pain with complete of B LE exercises or transfer training  . ASSESSMENT OF PROGRESS TOWARD GOALS: Motivated to complete goals and return to PLOF  . PLAN FOR NEXT VISIT: introduce theraband to increase strengthening of B LE. Multi height transfers to include car and toilet. THE FOLLOWING DISCHARGE PLANNING WAS DISCUSSED WITH THE PATIENT/CAREGIVER: Discussed with patient eventual discharge once patient has met goals and/or maximum benefit from home health skilled physical  therapy services, patient understands and agreeable to goals. At this time needs continued skilled home health physical therapy to address deficits, reduce fall risk and to obtain goals previously established per plan of care. 1x1 2x3 visit are left.

## 2023-01-17 ENCOUNTER — HOME CARE VISIT (OUTPATIENT)
Dept: HOME HEALTH SERVICES | Facility: HOME HEALTH | Age: 78
End: 2023-01-17
Payer: MEDICARE

## 2023-01-17 VITALS
HEART RATE: 59 BPM | OXYGEN SATURATION: 99 % | SYSTOLIC BLOOD PRESSURE: 125 MMHG | RESPIRATION RATE: 17 BRPM | DIASTOLIC BLOOD PRESSURE: 63 MMHG | TEMPERATURE: 98.3 F

## 2023-01-18 VITALS
TEMPERATURE: 98.2 F | OXYGEN SATURATION: 97 % | SYSTOLIC BLOOD PRESSURE: 138 MMHG | HEART RATE: 62 BPM | RESPIRATION RATE: 20 BRPM | DIASTOLIC BLOOD PRESSURE: 74 MMHG

## 2023-01-18 NOTE — HOME HEALTH
Skilled services/Home bound verification:   Skilled Reason for admission/summary of clinical condition:  afib requiring disease process teaching and medication management . This patient is homebound for the following reasons Requires considerable and taxing effort to leave the home  and Requires the assistance of 1 or more persons to leave the home . Primary caregiver is her  who is available 24/7 and is able to assist with bathing, dressing, walking, turn in bed, bathroom, meal prep and setup, medication management, grocery shopping, household chores and transportation to MD appointment  Medications reconciled and all medications are not available in the home this visit. pt needing to get synthroid filled. pt does not take vitamin d3, amiodarone or celebrex. The following education was provided regarding medications, medication interactions, and look alike medications (specify): reviewed side effects, purposes, dosage, frequencies. Medications  are effective at this time. High risk medication teaching regarding anticoagulants, hyperglycemic agents or opiod narcotics performed (specify) eliquis and amoxicillin-reviewed side effects, purposes, dosage, frequencies. patient to follow eliquis regimen as directed by MD and to monitor for s/s of excessive bleeding, aware who to report to/when. Patients made aware to report bleeding to their practitioner including blood in the urine or stools and bleeding from minor cuts and scratches that won't stop. Patient also made aware to recognize the signs and symptoms of significant bleeding that can include unsteady gait, dizziness, new headache, and nausea and vomiting. MD notified of any discrepancies/medication interactions- NA no severe interactions noted.    Home health supplies by type and quantity ordered/delivered this visit include: NA  Patient education provided this visit to include: patient/cg instructed to monitor for edema/increase in edema, to elevate extremity when edema occurs and to notify md if edema exceeds normal limits for patient, none noted at this time. patient made aware to monitor for s/s of infection [increased swelling, increased redness around site, increased pain, foul smelling drainage, fever] aware who to report to/when. no s/s of infection noted. discussed afib and how to take radial pulse for 1 minute and to notify MD of any heart rate changes. also notified patient of heart healthy diet- instructed patient to reduce intake of saturated and fatty acids (butter, creams, red meats, fried foods, margarines, high fat baked goods, shortening, cheeses, etc) and to increase daily fresh fruits and vegetables and whole grains, encouraged to avoid canned and processed foods as much as possible. discussed fall precautions in detail- having lighted hallways, removing throw rugs, monitoring medication that may alter mental status. patient made aware to turn a minimum of every 2 hours and to keep pressure off of bony prominences, to monitor for any pressure ulcer development/worsening. Sharps education provided: NA  Patient level of understanding of education provided:  patient has a partial understanding of education that was provided at this time by engaging in all education provided. pt denies any questions or concerns at this time. Skilled Care Performed this visit:  full body assessment and education as above. Patient response to procedure performed:  patient tolerated assessment with no signs of discomfort, grimacing or pain, no complications or concerns noted. Home exercise program/Homework provided: patient instructed to perform sob hep 4-5 x daily and prn for sob, to promote lung expansion. pt also encouraged to use ICS q 2 hours and to perform sob hep during therapy.  patient instructed to perform incontinence hep 3-4 x daily to strengthen muscles and to perform timed voiding q 2 hours to prevent incontinence from occurring. Pt/Caregiver instructed on plan of care and are agreeable to plan of care at this time. Noah Rodriguez MD notified of patient admission to home health and plan of care including anticipated frequency of 1w1 2w2 1w5 2 prn and treatments/interventions/modalities of disease process teaching and medication management. Discharge planning discussed with patient and caregiver. Discharge planning as follows: Patient will be discharged once education has completed, patient is medically stable and pt/cg are able to independently manage medications and disease process. Pt/Caregiver did verbalize understanding of discharge planning. Next MD appointment TBD (date) with PCP. Pt saw cardiology last week. Patient/caregiver encouraged/instructed to keep appointment as lack of follow through with physician appointment could result in discontinuation of home care services due to non-compliance.

## 2023-01-18 NOTE — HOME HEALTH
Skilled reason for visit: Skilled RN Assessment, Education  Caregiver involvement: present, supportive  Medications reviewed and all medications are available in the home this visit. The following education was provided regarding medications: Patient and Caregiver educated on Indications, Mechanism of Action, Interactions, S/E of medications on their current Medication List. sn instructed patient and cg on importance of compliance wirh prescribed meidcations      MD notified of any discrepancies/look a-like medications/medication interactions: n/a  Medications are available at this time. Home health supplies by type and quantity ordered/delivered this visit include: n/a  Patient education provided this visit: Medications, Health Maintenance and Management, Disease Processes, Safety  Sharps education provided: n/a  Patient level of understanding of education provided:  progressing  Skilled Care Performed this visit: Skilled RN Assessment, Education   Patient response to procedure performed:   Tolerated assessment well  Patient's Progress towards personal goals: progressing  Home exercise program: as jonathan  Continued need for the following skills: Skilled RN Assessment, Education  Plan for next visit: Continue to assess and educate patient and caregiver     Patient and/or caregiver notified and agrees to changes in the Plan of Care NA  The following discharge planning was discussed with the pt/caregiver:  Discharge when patient goals are met

## 2023-01-18 NOTE — CASE COMMUNICATION
Patient admitted to [address] [place] [address] on 1/14/23 for afib. Jv Winslow MD notified of patient admission to home health and plan of care including anticipated frequency of 1w1 2w2 1w5 2 prn and treatments/interventions/modalities of disease process teaching and medication management. Medications reconciled and all medications are not available in the home this visit. pt needing to get synthroid filled. pt does not take vitamin  d3, amiodarone or celebrex. No medication interactions noted on admission visit. Please call #[phone] with any questions. Thank you!   Shannen Valencia, RN, BSN

## 2023-01-19 ENCOUNTER — HOME CARE VISIT (OUTPATIENT)
Dept: SCHEDULING | Facility: HOME HEALTH | Age: 78
End: 2023-01-19
Payer: MEDICARE

## 2023-01-19 VITALS
HEART RATE: 86 BPM | DIASTOLIC BLOOD PRESSURE: 63 MMHG | TEMPERATURE: 98.3 F | RESPIRATION RATE: 14 BRPM | OXYGEN SATURATION: 94 % | SYSTOLIC BLOOD PRESSURE: 122 MMHG

## 2023-01-19 VITALS
HEART RATE: 81 BPM | OXYGEN SATURATION: 95 % | SYSTOLIC BLOOD PRESSURE: 122 MMHG | DIASTOLIC BLOOD PRESSURE: 82 MMHG | RESPIRATION RATE: 16 BRPM

## 2023-01-19 PROCEDURE — G0299 HHS/HOSPICE OF RN EA 15 MIN: HCPCS

## 2023-01-19 PROCEDURE — G0157 HHC PT ASSISTANT EA 15: HCPCS

## 2023-01-19 NOTE — HOME HEALTH
SUBJECTIVE: I didn't feel good yesterday but today I'm better  CAREGIVER INVOLVEMENT/ASSISTANCE NEEDED FOR: Lives with  who can assist with IADLs and driving  . OBJECTIVE:  See interventions. PATIENT EDUCATION PROVIDED THIS VISIT: Continue HEP for B LE strengthening  PATIENT RESPONSE TO EDUCATION PROVIDED: Verbalized understanding  PATIENT RESPONSE TO TREATMENT: no increase in pain with B LE exercises or transfer training  . ASSESSMENT OF PROGRESS TOWARD GOALS: Good progress towards. Increased AROM and stamina noted with B LE exercises and transfers  . PLAN FOR NEXT VISIT: Car transfer for safety with MD appointments  THE FOLLOWING DISCHARGE PLANNING WAS DISCUSSED WITH THE PATIENT/CAREGIVER:  At this time needs continued skilled home health physical therapy to address deficits, reduce fall risk and to obtain goals previously established per plan of care. 2x3 visit are left.

## 2023-01-20 NOTE — HOME HEALTH
Skilled reason for visit: education regarding AFIB     Caregiver involvement:  and family help with all needs . Medications reviewed and all medications are available in the home this visit. The following education was provided regarding medications:  eliquis and what it is for and bleeding precautions . MD notified of any discrepancies/look a-like medications/medication interactions: none  Medications are reconciled  at this time.       Home health supplies by type and quantity ordered/delivered this visit include: none     Patient education provided this visit: SN educated on AFIB and s/s of AFIB as well as bleeding precautions     Sharps education provided: NA    Patient level of understanding of education provided: patient verbalized understanding     Skilled Care Performed this visit: education     Patient response to procedure performed:  NA    Agency Progress toward goals: progressing     Patient's Progress towards personal goals: patient stated that she is doing ok d    Home exercise program: deep breathing     Continued need for the following skills: Nursing and Physical Therapy    Plan for next visit: education    Patient and/or caregiver notified and agrees to changes in the Plan of Care YES/NO/NA: N/A      The following discharge planning was discussed with the pt/caregiver: when goals met and education is complete

## 2023-01-22 LAB
ATRIAL RATE: 100 BPM
ATRIAL RATE: 62 BPM
CALCULATED P AXIS, ECG09: 57 DEGREES
CALCULATED R AXIS, ECG10: 17 DEGREES
CALCULATED R AXIS, ECG10: 34 DEGREES
CALCULATED T AXIS, ECG11: 53 DEGREES
CALCULATED T AXIS, ECG11: 54 DEGREES
DIAGNOSIS, 93000: NORMAL
DIAGNOSIS, 93000: NORMAL
P-R INTERVAL, ECG05: 152 MS
P-R INTERVAL, ECG05: 158 MS
Q-T INTERVAL, ECG07: 414 MS
Q-T INTERVAL, ECG07: 468 MS
QRS DURATION, ECG06: 100 MS
QRS DURATION, ECG06: 96 MS
QTC CALCULATION (BEZET), ECG08: 475 MS
QTC CALCULATION (BEZET), ECG08: 534 MS
VENTRICULAR RATE, ECG03: 100 BPM
VENTRICULAR RATE, ECG03: 62 BPM

## 2023-01-23 ENCOUNTER — HOME CARE VISIT (OUTPATIENT)
Dept: SCHEDULING | Facility: HOME HEALTH | Age: 78
End: 2023-01-23
Payer: MEDICARE

## 2023-01-23 PROCEDURE — G0300 HHS/HOSPICE OF LPN EA 15 MIN: HCPCS

## 2023-01-24 ENCOUNTER — HOME CARE VISIT (OUTPATIENT)
Dept: SCHEDULING | Facility: HOME HEALTH | Age: 78
End: 2023-01-24
Payer: MEDICARE

## 2023-01-24 VITALS
HEART RATE: 59 BPM | DIASTOLIC BLOOD PRESSURE: 83 MMHG | TEMPERATURE: 98.4 F | RESPIRATION RATE: 16 BRPM | OXYGEN SATURATION: 97 % | SYSTOLIC BLOOD PRESSURE: 132 MMHG

## 2023-01-24 PROCEDURE — G0157 HHC PT ASSISTANT EA 15: HCPCS

## 2023-01-24 NOTE — HOME HEALTH
Skilled reason for visit: Aib  Caregiver involvement: spouse in home and assist as needed. Medications reviewed and all medications are available in the home this visit. The following education was provided regarding medications, medication interactions, and look alike medications (specify): no med changes noted or reported. Medications  are effective at this time. Home health supplies by type and quantity ordered/delivered this visit include: none  Patient education provided this visit:  Pt Instructed on some signs/symptoms of cerebrovascular accident (stroke), such as: temporary loss of speech or slurred speech,  sudden temporary weakness or numbness on one side of the body, among others. Also that hypertension, obesity and a diet high in cholesterol are potential factors of cerebrovascular accident   Patient was instructed on strategies that can significantly help decrease the risk of a fall such as: Good lighting throughout the home, especially in stairwells and hallways, Non-slip floors and rugs, Hand rails on stairs, next to the toilet and in the shower and bathtub. Progress toward goals: [facility] by taking all medication as prescribed, and keeping all medical appts. Home exercise program: Medications reviewed and all medications are available in the home this visit. The following education was provided regarding medications, medication interactions, and look alike medications (specify): no med changes. Medications  are effective at this time. Home health supplies by type and quantity ordered/delivered this visit include: none  Patient education provided this visit:patient made aware to monitor for s/s of infection [increased swelling, increased redness around site, increased pain, foul smelling drainage, fever] aware who to report to/when.  reviewed cardiac diet- monitoring sodium intake, cholesterol and fat intake.  patient aware to limit sodium, no added sodium to diet. reviewed foods to avoid, how to order foods when eating out, how to read nutrition labels and measure sodium, cholesterol and fat intake. INSTRUCTED PATIENT AND CG THAT SHOULD ANY NEEDS OR CONCERNS ARISE TO FIRST CALL OUR OFFICE, OR THE DR'S OFFICE  OR GO TO AN URGENT CARE CENTER AND NOT TO THE ED FOR NON-LIFE THREATENING EVENTS. IF IT IS LIFE THREATENING THEN CALL 911 OR GO TO THE CLOSEST ER. Patient is a fall risk. Educated pateint to sit on the side of the chair/bed, take a slow deep breaths, have feet firmly planted before standing up, use cane/walker if available, or have someone to assist.    Progress toward goals: Pt reports med compliance and keeping all medical appts. pt verbalizes understanding of education provided. Home exercise program: pt to monitor for s/s of afibsuch as irregular heart rate, SOB, chest tightness or pain.   Continued need for the following skills: Nursing and Physical Therapy  Patient and/or caregiver notified and agrees to changes in the Plan of Care N/A    Patient will be discharged once education and wounds if applicable has been completed, patient is medically stable, and all goals met    Continued need for the following skills: Nursing and Physical Therapy  Patient and/or caregiver notified and agrees to changes in the Plan of Care YES/NO/NA: N/A    Patient will be discharged once education and wounds if applicable has been completed, patient is medically stable, and all goals met

## 2023-01-25 NOTE — HOME HEALTH
SUBJECTIVE: I do my exercises every day  CAREGIVER INVOLVEMENT/ASSISTANCE NEEDED FOR: Live with  who can assist with IALDs and driving  . OBJECTIVE:  See interventions. PATIENT EDUCATION PROVIDED THIS VISIT: Continue with seated B LE strengthening HEP  PATIENT RESPONSE TO EDUCATION PROVIDED: verbalized understanding    PATIENT RESPONSE TO TREATMENT: No pain or dizziness with transfers or exercises     ASSESSMENT OF PROGRESS TOWARD GOALS: Continues to work towards goals. Demonstrates increase stamina able to increase challenge with transfers and B LE exercises  . PLAN FOR NEXT VISIT: car and bed transfer   THE FOLLOWING DISCHARGE PLANNING WAS DISCUSSED WITH THE PATIENT/CAREGIVER: At this time needs continued skilled home health physical therapy to address deficits, reduce fall risk and to obtain goals previously established per plan of care. 1x1 2x2 visit are left.

## 2023-01-27 ENCOUNTER — HOME CARE VISIT (OUTPATIENT)
Dept: SCHEDULING | Facility: HOME HEALTH | Age: 78
End: 2023-01-27
Payer: MEDICARE

## 2023-01-27 VITALS
DIASTOLIC BLOOD PRESSURE: 63 MMHG | RESPIRATION RATE: 15 BRPM | SYSTOLIC BLOOD PRESSURE: 127 MMHG | OXYGEN SATURATION: 95 % | TEMPERATURE: 98.4 F | HEART RATE: 57 BPM

## 2023-01-27 PROCEDURE — G0157 HHC PT ASSISTANT EA 15: HCPCS

## 2023-01-28 NOTE — HOME HEALTH
SUBJECTIVE: I feel like I'm getting stronger  CAREGIVER INVOLVEMENT/ASSISTANCE NEEDED FOR: Lives with  who can assist with ADLs and driving  . OBJECTIVE:  See interventions. PATIENT EDUCATION PROVIDED THIS VISIT: Continue with B LE strengthening HEP   PATIENT RESPONSE TO EDUCATION PROVIDED: verbalized understanding  PATIENT RESPONSE TO TREATMENT: no pain with B LE exercises or transfer training  . ASSESSMENT OF PROGRESS TOWARD GOALS: Shows increased strength in B LE for transfers and control throughout exercises  . PLAN FOR NEXT VISIT: car transfer  THE FOLLOWING DISCHARGE PLANNING WAS DISCUSSED WITH THE PATIENT/CAREGIVER: At this time needs continued skilled home health physical therapy to address deficits, reduce fall risk and to obtain goals previously established per plan of care. 2x2 visit are left.

## 2023-01-31 ENCOUNTER — HOME CARE VISIT (OUTPATIENT)
Dept: SCHEDULING | Facility: HOME HEALTH | Age: 78
End: 2023-01-31
Payer: MEDICARE

## 2023-01-31 VITALS
HEART RATE: 55 BPM | OXYGEN SATURATION: 94 % | DIASTOLIC BLOOD PRESSURE: 64 MMHG | RESPIRATION RATE: 14 BRPM | SYSTOLIC BLOOD PRESSURE: 104 MMHG | TEMPERATURE: 98.6 F

## 2023-01-31 PROCEDURE — G0157 HHC PT ASSISTANT EA 15: HCPCS

## 2023-01-31 NOTE — HOME HEALTH
SUBJECTIVE: I'm getting stronger with transfers  CAREGIVER INVOLVEMENT/ASSISTANCE NEEDED FOR: Lives with  who can assist with ADL and driving  . OBJECTIVE:  See interventions. PATIENT EDUCATION PROVIDED THIS VISIT: Continue with HEP and transfers for pressure relief  PATIENT RESPONSE TO EDUCATION PROVIDED: verbalized understanding  PATIENT RESPONSE TO TREATMENT: no pain with transfer or B LE exercises  . ASSESSMENT OF PROGRESS TOWARD GOALS: Pain management, fall prevention goal are met can teach back strategies. Mod Independent bed mobility with bed rail for safe controlled entry/exit out of bed  . PLAN FOR NEXT VISIT: car transfer for MD appointments  THE FOLLOWING DISCHARGE PLANNING WAS DISCUSSED WITH THE PATIENT/CAREGIVER:  At this time needs continued skilled home health physical therapy to address deficits, reduce fall risk and to obtain goals previously established per plan of care. 1x1 2x1 visit are left.

## 2023-02-03 ENCOUNTER — HOME CARE VISIT (OUTPATIENT)
Dept: SCHEDULING | Facility: HOME HEALTH | Age: 78
End: 2023-02-03
Payer: MEDICARE

## 2023-02-03 VITALS — TEMPERATURE: 98.2 F | HEART RATE: 57 BPM | OXYGEN SATURATION: 97 % | RESPIRATION RATE: 15 BRPM

## 2023-02-03 PROCEDURE — G0157 HHC PT ASSISTANT EA 15: HCPCS

## 2023-02-03 NOTE — HOME HEALTH
SUBJECTIVE: I can feel I have my strength back  CAREGIVER INVOLVEMENT/ASSISTANCE NEEDED FOR: Lives with  who can set up transfer and assist with ADLs and driving  . OBJECTIVE:  See interventions. PATIENT EDUCATION PROVIDED THIS VISIT: Continue with HEP  PATIENT RESPONSE TO EDUCATION PROVIDED: verbalized understanding  PATIENT RESPONSE TO TREATMENT: No increase in pain with B LE exercises or transfer training  . ASSESSMENT OF PROGRESS TOWARD GOALS: goals are met  . PLAN FOR NEXT VISIT: confirm all transfers are safe   THE FOLLOWING DISCHARGE PLANNING WAS DISCUSSED WITH THE PATIENT/CAREGIVER: At this time needs continued skilled home health physical therapy to address deficits, reduce fall risk and to obtain goals previously established per plan of care. 2x1 visit are left.

## 2023-02-04 ENCOUNTER — HOME CARE VISIT (OUTPATIENT)
Dept: HOME HEALTH SERVICES | Facility: HOME HEALTH | Age: 78
End: 2023-02-04
Payer: MEDICARE

## 2023-02-06 ENCOUNTER — HOME CARE VISIT (OUTPATIENT)
Dept: SCHEDULING | Facility: HOME HEALTH | Age: 78
End: 2023-02-06
Payer: MEDICARE

## 2023-02-06 VITALS
DIASTOLIC BLOOD PRESSURE: 73 MMHG | OXYGEN SATURATION: 94 % | TEMPERATURE: 98.3 F | SYSTOLIC BLOOD PRESSURE: 108 MMHG | HEART RATE: 58 BPM | RESPIRATION RATE: 14 BRPM

## 2023-02-06 PROCEDURE — G0157 HHC PT ASSISTANT EA 15: HCPCS

## 2023-02-06 NOTE — Clinical Note
Assessment and Summary of Care:  Patient's current functional status before discharge is as follows  Bed Mobility: Mod independent with use of bed rail for support. Transfers: SPT with S to complete transfers. total A for set up from transport chair  Gait/WC mobility: Does not walk  Stairs:  Total A with a ramp  Recommendations: Home with HEP

## 2023-02-06 NOTE — HOME HEALTH
SUBJECTIVE: I had a great birthday  CAREGIVER INVOLVEMENT/ASSISTANCE NEEDED FOR: Live with  who can assist with ADLs and driving  . OBJECTIVE:  See interventions. PATIENT EDUCATION PROVIDED THIS VISIT:  AP, ADITYA and anai to be complete 3x10 day of 10 reps each  PATIENT RESPONSE TO EDUCATION PROVIDED: verbalized understanding  PATIENT RESPONSE TO TREATMENT: no pain with transfers  . Assessment and Summary of Care:  Patient's current functional status before discharge is as follows  Bed Mobility: Mod independent with use of bed rail for support. Transfers: SPT with S to complete transfers. total A for set up from transport chair  Gait/WC mobility: Does not walk  Stairs: Total A with a ramp  Recommendations: Home with HEP      ASSESSMENT OF PROGRESS TOWARD GOALS: has met her goals for safety and bed mobility  .   PLAN FOR NEXT VISIT: [place] PT discharge next visit  THE FOLLOWING DISCHARGE PLANNING WAS DISCUSSED WITH THE PATIENT/CAREGIVER: Home with HEP to include B LE exercises and stationary bike

## 2023-02-06 NOTE — Clinical Note
thanks for the update  ----- Message -----  From: Maria Guadalupe Goodrich PTA  Sent: 2/6/2023   3:20 PM EST  To: Charlie Garcia PT      Assessment and Summary of Care:  Patient's current functional status before discharge is as follows  Bed Mobility: Mod independent with use of bed rail for support. Transfers: SPT with S to complete transfers. total A for set up from transport chair  Gait/WC mobility: Does not walk  Stairs:  Total A with a ramp  Recommendations: Home with HEP

## 2023-02-07 ENCOUNTER — HOME CARE VISIT (OUTPATIENT)
Dept: HOME HEALTH SERVICES | Facility: HOME HEALTH | Age: 78
End: 2023-02-07
Payer: MEDICARE

## 2023-02-08 ENCOUNTER — HOME CARE VISIT (OUTPATIENT)
Dept: HOME HEALTH SERVICES | Facility: HOME HEALTH | Age: 78
End: 2023-02-08
Payer: MEDICARE

## 2023-02-08 ENCOUNTER — HOME CARE VISIT (OUTPATIENT)
Dept: SCHEDULING | Facility: HOME HEALTH | Age: 78
End: 2023-02-08
Payer: MEDICARE

## 2023-02-08 VITALS
HEART RATE: 75 BPM | RESPIRATION RATE: 14 BRPM | SYSTOLIC BLOOD PRESSURE: 118 MMHG | TEMPERATURE: 97.2 F | OXYGEN SATURATION: 90 % | DIASTOLIC BLOOD PRESSURE: 70 MMHG

## 2023-02-08 PROCEDURE — G0151 HHCP-SERV OF PT,EA 15 MIN: HCPCS

## 2023-02-08 NOTE — Clinical Note
Patient is s/p A FIB  and has been treated for ROM, strengthening, gait training, stair training, HEP training, safety training, and balance training. On [facility] 1/15/23  strength was B hip flexor, extensor, hip abductors, adductors 3/5, B knee flexor extensor 3/5,Today at [facility] strength is  B hip flexors, ABD/ ADD quads and hamstrings +4/5. On [facility] bed mobility was Patient needed Min A x1 with bed mobility . Today at [facility] bed mobility is supine<> sit with bed railing MOD I . Per PTA visit 2/6/23 Mod independent with use of bed rail for support On [facility] transfers were  patient needed Mod A x1 with bed., chair and toilet transfers using RW . Today at [facility] transfers are stand piviot transfer recliner chair <> transport chair with S. Stand piviot transfer wc<> bed with bed railing with S Per PTA visit 2/6/23 Total A set up comfy chair <>transfer chair. SPT with S to complete transfers. reaches across and brings self across to other chair. On [facility] gait was non ambulatory, uses w/c as primary mode of mobility  Today on [facility] skills are  Pt is I with locking breaks on transport chair. Pt was able to self propell transport chair on tiled surfaces in kitchen MOD I A needed on carpeted surfaces . On [facility] stairs were NA . Per PTA visit 2/6/23 Total A with a ramp. On [facility] balance was NA. Today at [facility] balancePt was able to maintain a static symmetrical sitting posture on the EOB with U UE support on bed railing MOD I without any balance checks   Pt has made progress and has met all goals. Discharge plan: Discharge from [address] [place] [address] services as all goals have been met.   [facility] of DC from [address]  with goals met

## 2023-02-08 NOTE — HOME HEALTH
SUBJECTIVE: I am doing well today   REQUIRES CAREGIVER ASSISTANCE FOR: transportation, medications, IADLS   MEDICATIONS REVIEWED AND RECONCILED no changes   NEXT MD APPT: Dr. Deb Taylor in March   ROM: B LE WFL   STRENGTH: B hip flexors, ABD/ ADD quads and hamstrings +4/5  WOUNDS:none   BED MOBILITY: supine<> sit with bed railing MOD I . Per PTA visit 2/6/23 Mod independent with use of bed rail for support  TRANSFERS: stand piviot transfer recliner chair <> transport chair with S. Stand piviot transfer wc<> bed with bed railing with S Per PTA visit 2/6/23 Total A set up comfy chair <>transfer chair. SPT with S to complete transfers. reaches across and brings self across to other chair. WC SKILLS : Pt is I with locking breaks on transport chair. Pt was able to self propell transport chair on tiled surfaces in kitchen MOD I A needed on carpeted surfaces   RAMP Per PTA visit 2/6/23 Total A with a ramp  BALANCE: Pt was able to maintain a static symmetrical sitting posture on the EOB with U UE support on bed railing MOD I without any balance checks   PATIENT RESPONE TO TX: Pt pain level remained the same   PATIENT LEVEL OF UNDERSTANDING OF EDUCATION PROVIDED Pt educated to have someone with her at all times when transfering for safety   PATIENT EDUCATION PROVIDED THIS VISIT: safety, HEP, walking, deep breathing,   HEP consisting of: B LE exercises with good teach back of exercises in HEP with 75% accuracy. Educated to complete full ROM and to control concentric movement to complete full benefit from HEP. patient and care giver verbalized understanding. HEP consists of AP, LAQ and marching to be complete 3x10 day of 10 reps each   Written HEP issued, patient/caregiver verbalized understanding. Patient is s/p A FIB  and has been treated for ROM, strengthening, gait training, stair training, HEP training, safety training, and balance training.   On [facility] 1/15/23  strength was B hip flexor, extensor, hip abductors, adductors 3/5, B knee flexor extensor 3/5,Today at [facility] strength is  B hip flexors, ABD/ ADD quads and hamstrings +4/5. On [facility] bed mobility was Patient needed Min A x1 with bed mobility . Today at [facility] bed mobility is supine<> sit with bed railing MOD I . Per PTA visit 2/6/23 Mod independent with use of bed rail for support On [facility] transfers were  patient needed Mod A x1 with bed., chair and toilet transfers using RW . Today at [facility] transfers are stand piviot transfer recliner chair <> transport chair with S. Stand piviot transfer wc<> bed with bed railing with S Per PTA visit 2/6/23 Total A set up comfy chair <>transfer chair. SPT with S to complete transfers. reaches across and brings self across to other chair. On [facility] gait was non ambulatory, uses w/c as primary mode of mobility  Today on [facility] skills are  Pt is I with locking breaks on transport chair. Pt was able to self propell transport chair on tiled surfaces in kitchen MOD I A needed on carpeted surfaces . On [facility] stairs were NA . Per PTA visit 2/6/23 Total A with a ramp. On [facility] balance was NA. Today at [facility] balancePt was able to maintain a static symmetrical sitting posture on the EOB with U UE support on bed railing MOD I without any balance checks   Pt has made progress and has met all goals. Discharge plan: Discharge from [facility] services as all goals have been met.   Dr Hermila Viveros office has been notified of DC from [address]  with goals met

## 2023-02-15 ENCOUNTER — HOME CARE VISIT (OUTPATIENT)
Dept: SCHEDULING | Facility: HOME HEALTH | Age: 78
End: 2023-02-15
Payer: MEDICARE

## 2023-02-15 PROCEDURE — G0300 HHS/HOSPICE OF LPN EA 15 MIN: HCPCS

## 2023-02-16 NOTE — HOME HEALTH
Skilled reason for visit: Afib  Caregiver involvement: spouse in home and assist as needed. Medications reviewed and all medications are available in the home this visit. The following education was provided regarding medications, medication interactions, and look alike medications (specify): no med changes noted or reported. Medications  are effective at this time. Home health supplies by type and quantity ordered/delivered this visit include: none  Patient education provided this visit:  Pt Instructed on some signs/symptoms of cerebrovascular accident (stroke), such as: temporary loss of speech or slurred speech,  sudden temporary weakness or numbness on one side of the body, among others. Also that hypertension, obesity and a diet high in cholesterol are potential factors of cerebrovascular accident     Progress toward goals: Pt progressing toward [facility] health by taking all medication as prescribed, and keeping all medical appts. pt also has a heart monitor in place upper left chest      Home exercise program: reviewed cardiac diet- monitoring sodium intake, cholesterol and fat intake. patient aware to limit sodium, no added sodium to diet. reviewed foods to avoid, how to order foods when eating out, how to read nutrition labels and measure sodium, cholesterol and fat intake.     Continued need for the following skills: Nursing  Patient and/or caregiver notified and agrees to changes in the Plan of Care YES/NO/NA: N/A    Patient will be discharged once education and wounds if applicable has been completed, patient is medically stable, and all goals met

## 2023-02-20 VITALS
TEMPERATURE: 98.2 F | RESPIRATION RATE: 16 BRPM | HEART RATE: 62 BPM | SYSTOLIC BLOOD PRESSURE: 124 MMHG | OXYGEN SATURATION: 99 % | DIASTOLIC BLOOD PRESSURE: 59 MMHG

## 2023-02-22 ENCOUNTER — HOME CARE VISIT (OUTPATIENT)
Dept: SCHEDULING | Facility: HOME HEALTH | Age: 78
End: 2023-02-22
Payer: MEDICARE

## 2023-03-03 ENCOUNTER — HOME CARE VISIT (OUTPATIENT)
Dept: SCHEDULING | Facility: HOME HEALTH | Age: 78
End: 2023-03-03
Payer: MEDICARE

## 2023-03-03 VITALS
HEART RATE: 61 BPM | OXYGEN SATURATION: 95 % | DIASTOLIC BLOOD PRESSURE: 62 MMHG | SYSTOLIC BLOOD PRESSURE: 118 MMHG | TEMPERATURE: 97 F | RESPIRATION RATE: 16 BRPM

## 2023-03-03 PROCEDURE — G0299 HHS/HOSPICE OF RN EA 15 MIN: HCPCS

## 2023-05-12 ENCOUNTER — HOSPITAL ENCOUNTER (EMERGENCY)
Facility: HOSPITAL | Age: 78
Discharge: HOME | End: 2023-05-12
Attending: STUDENT IN AN ORGANIZED HEALTH CARE EDUCATION/TRAINING PROGRAM
Payer: MEDICARE

## 2023-05-12 ENCOUNTER — APPOINTMENT (OUTPATIENT)
Facility: HOSPITAL | Age: 78
End: 2023-05-12
Payer: MEDICARE

## 2023-05-12 VITALS
BODY MASS INDEX: 21.6 KG/M2 | HEIGHT: 60 IN | OXYGEN SATURATION: 93 % | DIASTOLIC BLOOD PRESSURE: 62 MMHG | HEART RATE: 65 BPM | SYSTOLIC BLOOD PRESSURE: 93 MMHG | RESPIRATION RATE: 16 BRPM | WEIGHT: 110 LBS | TEMPERATURE: 97.1 F

## 2023-05-12 DIAGNOSIS — S12.9XXA CLOSED FRACTURE OF TRANSVERSE PROCESS OF CERVICAL VERTEBRA, INITIAL ENCOUNTER (HCC): ICD-10-CM

## 2023-05-12 DIAGNOSIS — M75.101 TEAR OF RIGHT ROTATOR CUFF, UNSPECIFIED TEAR EXTENT, UNSPECIFIED WHETHER TRAUMATIC: ICD-10-CM

## 2023-05-12 DIAGNOSIS — R58 ECCHYMOSIS: Primary | ICD-10-CM

## 2023-05-12 DIAGNOSIS — M19.90 ARTHRITIS: ICD-10-CM

## 2023-05-12 PROCEDURE — 72125 CT NECK SPINE W/O DYE: CPT

## 2023-05-12 PROCEDURE — 70450 CT HEAD/BRAIN W/O DYE: CPT

## 2023-05-12 PROCEDURE — 99284 EMERGENCY DEPT VISIT MOD MDM: CPT

## 2023-05-12 PROCEDURE — 73030 X-RAY EXAM OF SHOULDER: CPT

## 2023-05-12 PROCEDURE — 73200 CT UPPER EXTREMITY W/O DYE: CPT

## 2023-05-12 NOTE — ED TRIAGE NOTES
Pt arrived following a potential fall. Pt was at Dr. Yariel Langston office and there it was noted the patient had a large bruise to the right shoulder that looks in the process of healing. Pt is on eloquis for afib. Pt denies falling. Pt has hx of cerebellar ataxia and generally needs assistance with ambulation. Pt denies hitting her head. Pt in NAD att.

## 2023-05-12 NOTE — ED PROVIDER NOTES
EMERGENCY DEPARTMENT HISTORY AND PHYSICAL EXAM      Patient Name: Jarocho Helm  MRN: [id number]  YOB: 1945  Provider: Veria Cowden, PA  PCP: Gilberto Welsh MD   Time/Date of evaluation: 3:10 PM EDT on 5/12/23    History of Presenting Illness     Chief Complaint   Patient presents with    Shoulder Pain       History Provided By: Patient and Patient's      History (Narative):   Jarocho Helm is a 77 y.o. female with a PMHX of cerebellar ataxia, A-fib on Eliquis  who presents to the emergency department  by POV C/O bruise to the right shoulder. Patient does not remember having fallen or injured the arm. States she noticed the bruise 2 or 3 days ago. States it slightly tender with movement. She was seeing her neurologist today who instructed her to come to the ED to be evaluated. She has no headache dizziness neck pain chest pain shortness of breath. Past History     Past Medical History:  Past Medical History:   Diagnosis Date    Arthritis     Chronic pain     lower back    GERD (gastroesophageal reflux disease)     Ill-defined condition     Stephanie cerebella ataxia    Menopause     Age 47    Other ill-defined conditions(799.89)     Pessary    Other ill-defined conditions(799.89)     Stephanie's Cerebellar Ataxia    Psychiatric disorder     depression       Past Surgical History:  Past Surgical History:   Procedure Laterality Date    CERVICAL FUSION  2012    anterior    GI      prolapse rectum    HEENT      OS muscle surgery    ORTHOPEDIC SURGERY      Right knee ORIF    ORTHOPEDIC SURGERY  2014    right total hip    OTHER SURGICAL HISTORY      repair rectal prolapse       Family History:  Family History   Problem Relation Age of Onset    Breast Cancer Maternal Aunt        Social History:  Social History     Tobacco Use    Smoking status: Never    Smokeless tobacco: Never   Substance Use Topics    Alcohol use: Yes     Alcohol/week: 0.8 standard drinks    Drug use:  No

## 2023-08-18 ENCOUNTER — HOSPITAL ENCOUNTER (EMERGENCY)
Facility: HOSPITAL | Age: 78
Discharge: HOME | End: 2023-08-18
Payer: MEDICARE

## 2023-08-18 ENCOUNTER — APPOINTMENT (OUTPATIENT)
Facility: HOSPITAL | Age: 78
End: 2023-08-18
Payer: MEDICARE

## 2023-08-18 VITALS
OXYGEN SATURATION: 99 % | RESPIRATION RATE: 18 BRPM | WEIGHT: 110 LBS | SYSTOLIC BLOOD PRESSURE: 122 MMHG | DIASTOLIC BLOOD PRESSURE: 60 MMHG | TEMPERATURE: 98.2 F | HEART RATE: 65 BPM | BODY MASS INDEX: 21.48 KG/M2

## 2023-08-18 DIAGNOSIS — S90.31XA CONTUSION OF RIGHT FOOT, INITIAL ENCOUNTER: Primary | ICD-10-CM

## 2023-08-18 DIAGNOSIS — L03.113 CELLULITIS OF RIGHT UPPER EXTREMITY: ICD-10-CM

## 2023-08-18 PROCEDURE — 6370000000 HC RX 637 (ALT 250 FOR IP): Performed by: PHYSICIAN ASSISTANT

## 2023-08-18 PROCEDURE — 99283 EMERGENCY DEPT VISIT LOW MDM: CPT

## 2023-08-18 PROCEDURE — 73630 X-RAY EXAM OF FOOT: CPT

## 2023-08-18 PROCEDURE — 73610 X-RAY EXAM OF ANKLE: CPT

## 2023-08-18 RX ORDER — CEPHALEXIN 250 MG/1
250 CAPSULE ORAL 4 TIMES DAILY
Qty: 28 CAPSULE | Refills: 0 | Status: SHIPPED | OUTPATIENT
Start: 2023-08-18 | End: 2023-08-25

## 2023-08-18 RX ORDER — CEPHALEXIN 250 MG/1
250 CAPSULE ORAL
Status: COMPLETED | OUTPATIENT
Start: 2023-08-18 | End: 2023-08-18

## 2023-08-18 RX ADMIN — CEPHALEXIN 250 MG: 250 CAPSULE ORAL at 17:25

## 2023-08-18 ASSESSMENT — PAIN SCALES - GENERAL: PAINLEVEL_OUTOF10: 5

## 2023-08-18 NOTE — ED PROVIDER NOTES
Yes     Alcohol/week: 0.8 standard drinks    Drug use: No       Medications:  No current facility-administered medications for this encounter. Current Outpatient Medications   Medication Sig Dispense Refill    cephALEXin (KEFLEX) 250 MG capsule Take 1 capsule by mouth 4 times daily for 7 days 28 capsule 0    amiodarone (CORDARONE) 200 MG tablet Take 200 mg by mouth daily      ascorbic acid (VITAMIN C) 500 MG tablet Take 500 mg by mouth daily      vitamin D (CHOLECALCIFEROL) 25 MCG (1000 UT) TABS tablet Take 1,000 Units by mouth daily      famotidine (PEPCID) 20 MG tablet Take 20 mg by mouth 2 times daily as needed      gabapentin (NEURONTIN) 400 MG capsule Take 400 mg by mouth. PARoxetine (PAXIL) 20 MG tablet Take 20 mg by mouth daily      polyethylene glycol (GLYCOLAX) 17 GM/SCOOP powder Take 17 g by mouth daily         Allergies:  No Known Allergies    Social Determinants of Health:  Social Determinants of Health     Tobacco Use: Low Risk     Smoking Tobacco Use: Never    Smokeless Tobacco Use: Never    Passive Exposure: Not on file   Alcohol Use: Not on file   Financial Resource Strain: Not on file   Food Insecurity: Not on file   Transportation Needs: Not on file   Physical Activity: Not on file   Stress: Not on file   Social Connections: Not on file   Intimate Partner Violence: Not on file   Depression: Not on file   Housing Stability: Not on file       Review of Systems     Negative except as listed above in HPI. Physical Exam   Physical Exam  Vitals and nursing note reviewed. Constitutional:       General: She is not in acute distress. Appearance: Normal appearance. She is not ill-appearing, toxic-appearing or diaphoretic. Comments: Pt sitting on wheelchair, non toxic appearance    HENT:      Head: Normocephalic and atraumatic. Cardiovascular:      Rate and Rhythm: Normal rate and regular rhythm. Pulmonary:      Effort: Pulmonary effort is normal. No respiratory distress.

## 2024-01-04 NOTE — PROGRESS NOTES
1900 - 0730: Patient remained medically stable throughout shift. No significant clinical changes noted. Bedside and Verbal shift change report given to [address] (oncoming nurse) by Bertha Vidal (offgoing nurse). Report included the following information SBAR, Kardex, and MAR. .

## (undated) DEVICE — Z DISCONTINUED USE (MFG CAT 7984-37) SOLUTION IV SODIUM CHL 0.9% 100 ML INJ

## (undated) DEVICE — STERILE POLYISOPRENE POWDER-FREE SURGICAL GLOVES: Brand: PROTEXIS

## (undated) DEVICE — PREP SKN PREVAIL 40ML APPL --

## (undated) DEVICE — SUTURE VCRL + SZ 2-0 L18IN ABSRB VLT CT-2 1/2 CIR TAPERCUT VCP726D

## (undated) DEVICE — SOL IRRIGATION INJ NACL 0.9% 500ML BTL

## (undated) DEVICE — ROUND DISSECTORS: Brand: DEROYAL

## (undated) DEVICE — DRAIN KT WND 10FR RND 400ML --

## (undated) DEVICE — SUTURE ABSORBABLE BRAIDED 1-0 OS-8 CR 3X18 IN UD VICRYL J757T

## (undated) DEVICE — MEDI-VAC SUCTION HANDLE REGULAR CAPACITY: Brand: CARDINAL HEALTH

## (undated) DEVICE — 3.0MM PRECISION NEURO (MATCH HEAD)

## (undated) DEVICE — NON-ADHERENT STRIPS,OIL EMULSION: Brand: CURITY

## (undated) DEVICE — TRAY CATH 16FR DRN BG LF -- CONVERT TO ITEM 363158

## (undated) DEVICE — SYRINGE BLB 50CC IRRIG PLIABLE FNGR FLNG GRAD FLSK DISP

## (undated) DEVICE — X-RAY SPONGES,12 PLY: Brand: DERMACEA

## (undated) DEVICE — PACK PROCEDURE SURG LAMINECTOMY SPINE CUST

## (undated) DEVICE — Device

## (undated) DEVICE — REM POLYHESIVE ADULT PATIENT RETURN ELECTRODE: Brand: VALLEYLAB

## (undated) DEVICE — KENDALL SCD EXPRESS SLEEVES, KNEE LENGTH, MEDIUM: Brand: KENDALL SCD

## (undated) DEVICE — PLUS HANDPIECE WITH SPRAY TIP: Brand: SURGILAV

## (undated) DEVICE — ABDOMINAL PAD: Brand: DERMACEA